# Patient Record
Sex: FEMALE | Race: OTHER | HISPANIC OR LATINO
[De-identification: names, ages, dates, MRNs, and addresses within clinical notes are randomized per-mention and may not be internally consistent; named-entity substitution may affect disease eponyms.]

---

## 2021-01-01 ENCOUNTER — TRANSCRIPTION ENCOUNTER (OUTPATIENT)
Age: 86
End: 2021-01-01

## 2021-04-14 ENCOUNTER — TRANSCRIPTION ENCOUNTER (OUTPATIENT)
Age: 86
End: 2021-04-14

## 2021-07-08 ENCOUNTER — TRANSCRIPTION ENCOUNTER (OUTPATIENT)
Age: 86
End: 2021-07-08

## 2021-07-14 ENCOUNTER — APPOINTMENT (OUTPATIENT)
Dept: ORTHOPEDIC SURGERY | Facility: CLINIC | Age: 86
End: 2021-07-14

## 2022-10-26 ENCOUNTER — EMERGENCY (EMERGENCY)
Facility: HOSPITAL | Age: 87
LOS: 1 days | Discharge: HOME | End: 2022-10-26
Attending: EMERGENCY MEDICINE
Payer: MEDICARE

## 2022-10-26 VITALS
SYSTOLIC BLOOD PRESSURE: 116 MMHG | RESPIRATION RATE: 18 BRPM | DIASTOLIC BLOOD PRESSURE: 65 MMHG | HEART RATE: 74 BPM | OXYGEN SATURATION: 96 % | TEMPERATURE: 97 F

## 2022-10-26 VITALS
OXYGEN SATURATION: 97 % | DIASTOLIC BLOOD PRESSURE: 67 MMHG | HEART RATE: 75 BPM | TEMPERATURE: 98 F | SYSTOLIC BLOOD PRESSURE: 124 MMHG | RESPIRATION RATE: 19 BRPM

## 2022-10-26 LAB
ALBUMIN SERPL ELPH-MCNC: 3.4 G/DL — LOW (ref 3.5–5)
ALP SERPL-CCNC: 58 U/L — SIGNIFICANT CHANGE UP (ref 40–120)
ALT FLD-CCNC: 24 U/L DA — SIGNIFICANT CHANGE UP (ref 10–60)
ANION GAP SERPL CALC-SCNC: 6 MMOL/L — SIGNIFICANT CHANGE UP (ref 5–17)
AST SERPL-CCNC: 46 U/L — HIGH (ref 10–40)
BASOPHILS # BLD AUTO: 0.05 K/UL — SIGNIFICANT CHANGE UP (ref 0–0.2)
BASOPHILS NFR BLD AUTO: 0.7 % — SIGNIFICANT CHANGE UP (ref 0–2)
BILIRUB SERPL-MCNC: 0.5 MG/DL — SIGNIFICANT CHANGE UP (ref 0.2–1.2)
BUN SERPL-MCNC: 15 MG/DL — SIGNIFICANT CHANGE UP (ref 7–18)
CALCIUM SERPL-MCNC: 8.8 MG/DL — SIGNIFICANT CHANGE UP (ref 8.4–10.5)
CHLORIDE SERPL-SCNC: 98 MMOL/L — SIGNIFICANT CHANGE UP (ref 96–108)
CO2 SERPL-SCNC: 26 MMOL/L — SIGNIFICANT CHANGE UP (ref 22–31)
CREAT SERPL-MCNC: 0.69 MG/DL — SIGNIFICANT CHANGE UP (ref 0.5–1.3)
EGFR: 82 ML/MIN/1.73M2 — SIGNIFICANT CHANGE UP
EOSINOPHIL # BLD AUTO: 0.03 K/UL — SIGNIFICANT CHANGE UP (ref 0–0.5)
EOSINOPHIL NFR BLD AUTO: 0.4 % — SIGNIFICANT CHANGE UP (ref 0–6)
GLUCOSE SERPL-MCNC: 117 MG/DL — HIGH (ref 70–99)
HCT VFR BLD CALC: 44 % — SIGNIFICANT CHANGE UP (ref 34.5–45)
HGB BLD-MCNC: 14.3 G/DL — SIGNIFICANT CHANGE UP (ref 11.5–15.5)
IMM GRANULOCYTES NFR BLD AUTO: 0.3 % — SIGNIFICANT CHANGE UP (ref 0–0.9)
LIDOCAIN IGE QN: 152 U/L — SIGNIFICANT CHANGE UP (ref 73–393)
LYMPHOCYTES # BLD AUTO: 1.21 K/UL — SIGNIFICANT CHANGE UP (ref 1–3.3)
LYMPHOCYTES # BLD AUTO: 16 % — SIGNIFICANT CHANGE UP (ref 13–44)
MAGNESIUM SERPL-MCNC: 2.3 MG/DL — SIGNIFICANT CHANGE UP (ref 1.6–2.6)
MCHC RBC-ENTMCNC: 28.1 PG — SIGNIFICANT CHANGE UP (ref 27–34)
MCHC RBC-ENTMCNC: 32.5 GM/DL — SIGNIFICANT CHANGE UP (ref 32–36)
MCV RBC AUTO: 86.4 FL — SIGNIFICANT CHANGE UP (ref 80–100)
MONOCYTES # BLD AUTO: 0.53 K/UL — SIGNIFICANT CHANGE UP (ref 0–0.9)
MONOCYTES NFR BLD AUTO: 7 % — SIGNIFICANT CHANGE UP (ref 2–14)
NEUTROPHILS # BLD AUTO: 5.72 K/UL — SIGNIFICANT CHANGE UP (ref 1.8–7.4)
NEUTROPHILS NFR BLD AUTO: 75.6 % — SIGNIFICANT CHANGE UP (ref 43–77)
NRBC # BLD: 0 /100 WBCS — SIGNIFICANT CHANGE UP (ref 0–0)
NT-PROBNP SERPL-SCNC: 109 PG/ML — SIGNIFICANT CHANGE UP (ref 0–450)
PLATELET # BLD AUTO: 156 K/UL — SIGNIFICANT CHANGE UP (ref 150–400)
POTASSIUM SERPL-MCNC: 5.3 MMOL/L — SIGNIFICANT CHANGE UP (ref 3.5–5.3)
POTASSIUM SERPL-SCNC: 5.3 MMOL/L — SIGNIFICANT CHANGE UP (ref 3.5–5.3)
PROT SERPL-MCNC: 7.4 G/DL — SIGNIFICANT CHANGE UP (ref 6–8.3)
RBC # BLD: 5.09 M/UL — SIGNIFICANT CHANGE UP (ref 3.8–5.2)
RBC # FLD: 13.9 % — SIGNIFICANT CHANGE UP (ref 10.3–14.5)
SARS-COV-2 RNA SPEC QL NAA+PROBE: SIGNIFICANT CHANGE UP
SODIUM SERPL-SCNC: 130 MMOL/L — LOW (ref 135–145)
TROPONIN I, HIGH SENSITIVITY RESULT: 7.2 NG/L — SIGNIFICANT CHANGE UP
WBC # BLD: 7.56 K/UL — SIGNIFICANT CHANGE UP (ref 3.8–10.5)
WBC # FLD AUTO: 7.56 K/UL — SIGNIFICANT CHANGE UP (ref 3.8–10.5)

## 2022-10-26 PROCEDURE — 36415 COLL VENOUS BLD VENIPUNCTURE: CPT

## 2022-10-26 PROCEDURE — 85025 COMPLETE CBC W/AUTO DIFF WBC: CPT

## 2022-10-26 PROCEDURE — 83690 ASSAY OF LIPASE: CPT

## 2022-10-26 PROCEDURE — 82962 GLUCOSE BLOOD TEST: CPT

## 2022-10-26 PROCEDURE — 87635 SARS-COV-2 COVID-19 AMP PRB: CPT

## 2022-10-26 PROCEDURE — 83880 ASSAY OF NATRIURETIC PEPTIDE: CPT

## 2022-10-26 PROCEDURE — 84484 ASSAY OF TROPONIN QUANT: CPT

## 2022-10-26 PROCEDURE — 99284 EMERGENCY DEPT VISIT MOD MDM: CPT

## 2022-10-26 PROCEDURE — 80053 COMPREHEN METABOLIC PANEL: CPT

## 2022-10-26 PROCEDURE — 83735 ASSAY OF MAGNESIUM: CPT

## 2022-10-26 PROCEDURE — 99285 EMERGENCY DEPT VISIT HI MDM: CPT

## 2022-10-26 PROCEDURE — 93005 ELECTROCARDIOGRAM TRACING: CPT

## 2022-10-26 RX ORDER — ACETAMINOPHEN 500 MG
650 TABLET ORAL ONCE
Refills: 0 | Status: COMPLETED | OUTPATIENT
Start: 2022-10-26 | End: 2022-10-26

## 2022-10-26 RX ADMIN — Medication 650 MILLIGRAM(S): at 17:50

## 2022-10-26 NOTE — ED PROVIDER NOTE - PROGRESS NOTE DETAILS
Pt feels better. Ambulatory w/o assistance. Educated on results, care and f/u PMD, spine. Answered q's. Accompanied by friend.

## 2022-10-26 NOTE — ED ADULT NURSE NOTE - MODE OF DISCHARGE
CardioPulmonary, Neurological) by intake and observation. Intake form has been scanned into medical record. Patient has been instructed to contact their primary care physician regarding ROS issues if not already being addressed at this time. Co-morbidities/Complexities (which will affect course of rehabilitation):   []None           Arthritic conditions   []Rheumatoid arthritis (M05.9)  [x]Osteoarthritis (M19.91)   Cardiovascular conditions   [x]Hypertension (I10)  []Hyperlipidemia (E78.5)  []Angina pectoris (I20)  []Atherosclerosis (I70)   Musculoskeletal conditions   []Disc pathology   []Congenital spine pathologies   [x]Prior surgical intervention  []Osteoporosis (M81.8)  []Osteopenia (M85.8)   Endocrine conditions   []Hypothyroid (E03.9)  []Hyperthyroid Gastrointestinal conditions   []Constipation (V22.64)   Metabolic conditions   []Morbid obesity (E66.01)  []Diabetes type 1(E10.65) or 2 (E11.65)   []Neuropathy (G60.9)     Pulmonary conditions   []Asthma (J45)  []Coughing   []COPD (J44.9)   Psychological Disorders  []Anxiety (F41.9)  []Depression (F32.9)   []Other:   []Other:          Barriers to/and or personal factors that will affect rehab potential:              []Age  []Sex              []Motivation/Lack of Motivation                        []Co-Morbidities              []Cognitive Function, education/learning barriers              []Environmental, home barriers              []profession/work barriers  []past PT/medical experience  []other:  Justification:     Falls Risk Assessment (30 days):   [x] Falls Risk assessed and no intervention required. [] Falls Risk assessed and Patient requires intervention due to being higher risk   TUG score (>12s at risk):     [] Falls education provided, including       G-Codes:  PT G-Codes  Functional Assessment Tool Used: LEFS   Score: 51%  Functional Limitation: Mobility: Walking and moving around  Mobility: Walking and Moving Around Current Status ():  At least 40 percent but less than 60 percent impaired, limited or restricted  Mobility: Walking and Moving Around Goal Status (): At least 20 percent but less than 40 percent impaired, limited or restricted    ASSESSMENT:   Functional Impairments:     [x]Noted lumbar/proximal hip/LE joint hypomobility   [x]Decreased LE functional ROM   [x]Decreased core/proximal hip strength and neuromuscular control   [x]Decreased LE functional strength   [x]Reduced balance/proprioceptive control   []other:      Functional Activity Limitations (from functional questionnaire and intake)   []Reduced ability to tolerate prolonged functional positions   []Reduced ability or difficulty with changes of positions or transfers between positions   []Reduced ability to maintain good posture and demonstrate good body mechanics with sitting, bending, and lifting   []Reduced ability to sleep   [] Reduced ability or tolerance with driving and/or computer work   []Reduced ability to perform lifting, carrying tasks   [x]Reduced ability to squat   []Reduced ability to forward bend   [x]Reduced ability to ambulate prolonged functional periods/distances/surfaces   [x]Reduced ability to ascend/descend stairs   []Reduced ability to run, hop, cut or jump   []other:    Participation Restrictions   [x]Reduced participation in self care activities   [x]Reduced participation in home management activities   [x]Reduced participation in work activities   [x]Reduced participation in social activities. [x]Reduced participation in sport/recreation activities. Classification :    [x]Signs/symptoms consistent with post-surgical status including decreased ROM, strength and function.    []Signs/symptoms consistent with joint sprain/strain   []Signs/symptoms consistent with patella-femoral syndrome   []Signs/symptoms consistent with knee OA/hip OA   []Signs/symptoms consistent with internal derangement of knee/Hip   []Signs/symptoms consistent with functional hip weakness/NMR control      []Signs/symptoms consistent with tendinitis/tendinosis    []signs/symptoms consistent with pathology which may benefit from Dry needling      []other:      Prognosis/Rehab Potential:      []Excellent   [x]Good    []Fair   []Poor    Tolerance of evaluation/treatment:    []Excellent   [x]Good    []Fair   []Poor  Physical Therapy Evaluation Complexity Justification  [x] A history of present problem with:  [] no personal factors and/or comorbidities that impact the plan of care;  [x]1-2 personal factors and/or comorbidities that impact the plan of care  []3 personal factors and/or comorbidities that impact the plan of care  [x] An examination of body systems using standardized tests and measures addressing any of the following: body structures and functions (impairments), activity limitations, and/or participation restrictions;:  [x] a total of 1-2 or more elements   [] a total of 3 or more elements   [] a total of 4 or more elements   [x] A clinical presentation with:  [x] stable and/or uncomplicated characteristics   [] evolving clinical presentation with changing characteristics  [] unstable and unpredictable characteristics;   [x] Clinical decision making of [x] low, [] moderate, [] high complexity using standardized patient assessment instrument and/or measurable assessment of functional outcome. [x] EVAL (LOW) 68248 (typically 20 minutes face-to-face)  [] EVAL (MOD) 77174 (typically 30 minutes face-to-face)  [] EVAL (HIGH) 71499 (typically 45 minutes face-to-face)  [] RE-EVAL       PLAN:   Frequency/Duration: 1-2days per week for 6-8 Weeks:  Interventions:  [x]  Therapeutic exercise including: strength training, ROM, for Lower extremity and core   [x]  NMR activation and proprioception for LE, Glutes and Core   [x]  Manual therapy as indicated for LE, Hip and spine to include: Dry Needling/IASTM, STM, PROM, Gr I-IV mobilizations, manipulation.    [x] Modalities as needed that may Ambulatory

## 2022-10-26 NOTE — ED ADULT NURSE NOTE - CHPI ED NUR SYMPTOMS NEG
no chest pain/no chills/no congestion/no diaphoresis/no dizziness/no fever/no nausea/no shortness of breath/no vomiting

## 2022-10-26 NOTE — ED PROVIDER NOTE - CLINICAL SUMMARY MEDICAL DECISION MAKING FREE TEXT BOX
89 yo F with syncopal episode secondary to chronic sciatica pain to left leg. No head strike. Normal neuro exam, no acute EKG changes. Currently pain controlled. Will send labs given tylenol for pain and reassess

## 2022-10-26 NOTE — ED PROVIDER NOTE - NSFOLLOWUPINSTRUCTIONS_ED_ALL_ED_FT
Syncope, Adult    Outline of the head showing blood vessels that supply the brain.   Syncope refers to a condition in which a person temporarily loses consciousness. Syncope may also be called fainting or passing out. It is caused by a sudden decrease in blood flow to the brain. This can happen for a variety of reasons.    Most causes of syncope are not dangerous. It can be triggered by things such as needle sticks, seeing blood, pain, or intense emotion. However, syncope can also be a sign of a serious medical problem, such as a heart abnormality. Other causes can include dehydration, migraines, or taking medicines that lower blood pressure. Your health care provider may do tests to find the reason why you are having syncope.    If you faint, get medical help right away. Call your local emergency services (911 in the [place]      Follow these instructions at home:    Pay attention to any changes in your symptoms. Take these actions to stay safe and to help relieve your symptoms:    Knowing when you may be about to faint   •Signs that you may be about to faint include:  •Feeling dizzy, weak, light-headed, or like the room is spinning.      •Feeling nauseous.      •Seeing spots or seeing all white or all black in your field of vision.      •Having cold, clammy skin or feeling warm and sweaty.      •Hearing ringing in the ears (tinnitus).      •If you start to feel like you might faint, sit or lie down right away. If sitting, put your head down between your legs. If lying down, raise (elevate) your feet above the level of your heart.  •Breathe deeply and steadily. Wait until all the symptoms have passed.      •Have someone stay with you until you feel stable.        Medicines     •Take over-the-counter and prescription medicines only as told by your health care provider.      •If you are taking blood pressure or heart medicine, get up slowly and take several minutes to sit and then stand. This can reduce dizziness and decrease the risk of syncope.      Lifestyle     • Do not drive, use machinery, or play sports until your health care provider says it is okay.      • Do not drink alcohol.      • Do not use any products that contain nicotine or tobacco. These products include cigarettes, chewing tobacco, and vaping devices, such as e-cigarettes. If you need help quitting, ask your health care provider.      •Avoid hot tubs and saunas.      General instructions     •Talk with your health care provider about your symptoms. You may need to have testing to understand the cause of your syncope.      •Drink enough fluid to keep your urine pale yellow.    •Avoid prolonged standing. If you must stand for a long time, do movements such as:  •Moving your legs.      •Crossing your legs.      •Flexing and stretching your leg muscles.      •Squatting.        •Keep all follow-up visits. This is important.        Contact a health care provider if:    •You have episodes of near fainting.        Get help right away if:    •You faint.      •You hit your head or are injured after fainting.    •You have any of these symptoms that may indicate trouble with your heart:  •Fast or irregular heartbeats (palpitations).      •Unusual pain in your chest, abdomen, or back.      •Shortness of breath.        •You have a seizure.      •You have a severe headache.      •You are confused.      •You have vision problems.      •You have severe weakness or trouble walking.      •You are bleeding from your mouth or rectum, or you have black or tarry stool.      These symptoms may represent a serious problem that is an emergency. Do not wait to see if your symptoms will go away. Get medical help right away. Call your local emergency services (911 in the [place] Do not drive yourself to the hospital.       Summary    •Syncope refers to a condition in which a person temporarily loses consciousness. Syncope may also be called fainting or passing out. It is caused by a sudden decrease in blood flow to the brain.      •Signs that you may be about to faint include dizziness, feeling light-headed, feeling nauseous, sudden vision changes, or cold, clammy skin.      •Even though most causes of syncope are not dangerous, syncope can be a sign of a serious medical problem. Get help right away if you faint.      •If you start to feel like you might faint, sit or lie down right away. If sitting, put your head down between your legs. If lying down, raise (elevate) your feet above the level of your heart.      This information is not intended to replace advice given to you by your health care provider. Make sure you discuss any questions you have with your health care provider.      Lumbar Spinal Stenosis    WHAT YOU NEED TO KNOW:    What is lumbar spinal stenosis? Lumbar spinal stenosis is narrowing of the spinal canal in your lower back. Your spinal canal is the opening in your spine that contains your spinal cord. When your spinal canal narrows, it may put pressure on your spinal cord and nerves.     What causes lumbar spinal stenosis? Narrowing of your spinal canal may be caused by changes that happen as you age. These changes include bone spurs (growths), herniated discs, and thickened ligaments. Bone growths can be caused by osteoarthritis. A herniated disc bulges out between the vertebrae (bones) and into your spinal canal. Discs are spongy cushions between the vertebrae in your spine. Herniated discs may be caused by activities that increase stress on the spine. An example is heavy lifting. Ligaments that connect the vertebrae may thicken and harden as you get older. Other conditions, such as spinal injuries and Paget's disease, can also cause spinal stenosis.     What are the signs and symptoms of lumbar spinal stenosis? Signs and symptoms may start or get worse when you stand or walk. They are often relieved when you sit or lean forward.   •Low back pain      •Pain, numbness, tingling, or weakness in one or both legs      •Pain in your buttocks that extends to your thighs or legs      How is lumbar spinal stenosis diagnosed? Your healthcare provider will ask about your symptoms and when they started. He or she will ask if you have any medical conditions. Your provider may ask you to lift, bend, walk, sit, or reach. An x-ray, MRI or a CT may show problems in your spine that are causing spinal stenosis. You may be given contrast liquid to help the spine show up better in the pictures. Tell the healthcare provider if you have ever had an allergic reaction to contrast liquid. Do not enter the MRI room with anything metal. Metal can cause serious injury. Tell the healthcare provider if you have any metal in or on your body.    How is lumbar spinal stenosis treated?   •NSAIDs, such as ibuprofen, help decrease swelling, pain, and fever. NSAIDs can cause stomach bleeding or kidney problems in certain people. If you take blood thinner medicine, always ask your healthcare provider if NSAIDs are safe for you. Always read the medicine label and follow directions.      •Acetaminophen decreases pain and fever. It is available without a doctor's order. Ask how much to take and how often to take it. Follow directions. Read the labels of all other medicines you are using to see if they also contain acetaminophen, or ask your doctor or pharmacist. Acetaminophen can cause liver damage if not taken correctly.      •Prescription pain medicine may be given. Ask how to take this medicine safely.      •Muscle relaxers help decrease pain and muscle spasms.      •A steroid injection may be given to reduce inflammation. Steroid medicine is injected into the epidural space. The epidural space is between your spinal cord and vertebrae.       •A nerve block is an injection of numbing medicine. You may need a nerve block if your pain is not going away, or is getting worse.      •Surgery may be needed to widen your spinal canal or decrease pressure on your spinal cord. Surgery may also be done to fix damaged or injured vertebrae in your back.      How can I manage my symptoms?   •Go to physical and occupational therapy as directed. A physical therapist teaches you exercises to help improve movement and strength, and to decrease pain. An occupational therapist teaches you skills to help with your daily activities.       •Rest when you feel it is needed. Slowly start to do more each day. Return to your daily activities as directed.       •Apply heat on your back for 20 to 30 minutes every 2 hours for as many days as directed. Heat helps decrease pain and muscle spasms.       •Apply ice on your back for 15 to 20 minutes every hour or as directed. Use an ice pack, or put crushed ice in a plastic bag. Cover it with a towel before you apply it to your skin. Ice helps prevent tissue damage and decreases swelling and pain.      When should I seek immediate care?   •You have pain in your leg that does not go away or gets worse.      •You have trouble moving your legs.       •You cannot control when you urinate or have a bowel movement.      When should I contact my healthcare provider?   •You have new or worsening symptoms.      •Your symptoms keep you from doing your daily activities.      •You have questions or concerns about your condition or care.      CARE AGREEMENT:    You have the right to help plan your care. Learn about your health condition and how it may be treated. Discuss treatment options with your healthcare providers to decide what care you want to receive. You always have the right to refuse treatment.

## 2022-10-26 NOTE — ED PROVIDER NOTE - PATIENT PORTAL LINK FT
You can access the FollowMyHealth Patient Portal offered by [facility] by registering at the following website: [email or web address]. By joining [employer]’s FollowMyHealth portal, you will also be able to view your health information using other applications (apps) compatible with our system.

## 2022-10-26 NOTE — ED ADULT TRIAGE NOTE - TEMPERATURE IN CELSIUS (DEGREES C)
The patient is hemodynamically stable.  The pain is controlled.  Patient is on DVT prophylaxis.  Patient is using incentive spirometry.  Oxygen saturation is acceptable.  Advance diet as tolerated.  Advance activity as tolerated.  Monitor for ileus.  Patient is on Laxatives.  Surgical wound is stable.  Indication for monitor bed.
36.3

## 2022-10-26 NOTE — ED ADULT NURSE NOTE - OBJECTIVE STATEMENT
Pt AOx4, BIBA s/p syncopal episode while sitting down at the pharmacy. PT reports she has been having lower back and left leg pain, and the pain was so bad she passed out. Pt denies CP, SOB, dizziness. EKG done, pt placed on cardiac monitor, no signs of distress noted.

## 2022-10-26 NOTE — ED ADULT TRIAGE NOTE - CHIEF COMPLAINT QUOTE
BIBA sp syncope, "felt pain left leg and went down" unconscious and drooling about 5 minutes, a&OX3 in triage

## 2022-10-26 NOTE — ED PROVIDER NOTE - OBJECTIVE STATEMENT
89 yo F h/o spinal stenosis, left sided lumbar radiculopathy p/w syncopal episode after feeling severe pain to left leg while seated in pharmacy. Pt had witnessed syncope. Currently no complaints. Has had pain on and off to left leg to foot for several years. Currently undergoing physical therapy. Recommended epidural but refused in past. Pt suffered no fall and denies any weakness, numbness or bowel/bladder symptoms. No recent fever, chills, chest pain or sob.

## 2023-05-06 ENCOUNTER — NON-APPOINTMENT (OUTPATIENT)
Age: 88
End: 2023-05-06

## 2024-01-31 ENCOUNTER — EMERGENCY (EMERGENCY)
Facility: HOSPITAL | Age: 89
LOS: 1 days | Discharge: HOME | End: 2024-01-31
Attending: EMERGENCY MEDICINE
Payer: MEDICARE

## 2024-01-31 VITALS
OXYGEN SATURATION: 98 % | HEART RATE: 68 BPM | WEIGHT: 146.61 LBS | SYSTOLIC BLOOD PRESSURE: 186 MMHG | TEMPERATURE: 98 F | RESPIRATION RATE: 18 BRPM | DIASTOLIC BLOOD PRESSURE: 100 MMHG

## 2024-01-31 PROCEDURE — 99285 EMERGENCY DEPT VISIT HI MDM: CPT

## 2024-01-31 NOTE — ED ADULT TRIAGE NOTE - CHIEF COMPLAINT QUOTE
high blood pressure all day today like 190 over 100 sometimes goes up over 200 as per daughter no dizziness or any headache

## 2024-02-01 VITALS
OXYGEN SATURATION: 98 % | RESPIRATION RATE: 18 BRPM | DIASTOLIC BLOOD PRESSURE: 77 MMHG | SYSTOLIC BLOOD PRESSURE: 183 MMHG | HEART RATE: 61 BPM

## 2024-02-01 LAB
ALBUMIN SERPL ELPH-MCNC: 3.4 G/DL — LOW (ref 3.5–5)
ALP SERPL-CCNC: 58 U/L — SIGNIFICANT CHANGE UP (ref 40–120)
ALT FLD-CCNC: 20 U/L DA — SIGNIFICANT CHANGE UP (ref 10–60)
ANION GAP SERPL CALC-SCNC: 3 MMOL/L — LOW (ref 5–17)
AST SERPL-CCNC: 14 U/L — SIGNIFICANT CHANGE UP (ref 10–40)
BASOPHILS # BLD AUTO: 0.03 K/UL — SIGNIFICANT CHANGE UP (ref 0–0.2)
BASOPHILS NFR BLD AUTO: 0.5 % — SIGNIFICANT CHANGE UP (ref 0–2)
BILIRUB SERPL-MCNC: 0.6 MG/DL — SIGNIFICANT CHANGE UP (ref 0.2–1.2)
BUN SERPL-MCNC: 12 MG/DL — SIGNIFICANT CHANGE UP (ref 7–18)
CALCIUM SERPL-MCNC: 8.7 MG/DL — SIGNIFICANT CHANGE UP (ref 8.4–10.5)
CHLORIDE SERPL-SCNC: 98 MMOL/L — SIGNIFICANT CHANGE UP (ref 96–108)
CO2 SERPL-SCNC: 28 MMOL/L — SIGNIFICANT CHANGE UP (ref 22–31)
CREAT SERPL-MCNC: 0.63 MG/DL — SIGNIFICANT CHANGE UP (ref 0.5–1.3)
EGFR: 84 ML/MIN/1.73M2 — SIGNIFICANT CHANGE UP
EOSINOPHIL # BLD AUTO: 0.06 K/UL — SIGNIFICANT CHANGE UP (ref 0–0.5)
EOSINOPHIL NFR BLD AUTO: 0.9 % — SIGNIFICANT CHANGE UP (ref 0–6)
GLUCOSE SERPL-MCNC: 122 MG/DL — HIGH (ref 70–99)
HCT VFR BLD CALC: 46.2 % — HIGH (ref 34.5–45)
HGB BLD-MCNC: 14.9 G/DL — SIGNIFICANT CHANGE UP (ref 11.5–15.5)
IMM GRANULOCYTES NFR BLD AUTO: 0.5 % — SIGNIFICANT CHANGE UP (ref 0–0.9)
LYMPHOCYTES # BLD AUTO: 1.69 K/UL — SIGNIFICANT CHANGE UP (ref 1–3.3)
LYMPHOCYTES # BLD AUTO: 26.5 % — SIGNIFICANT CHANGE UP (ref 13–44)
MAGNESIUM SERPL-MCNC: 2.1 MG/DL — SIGNIFICANT CHANGE UP (ref 1.6–2.6)
MCHC RBC-ENTMCNC: 28.1 PG — SIGNIFICANT CHANGE UP (ref 27–34)
MCHC RBC-ENTMCNC: 32.3 GM/DL — SIGNIFICANT CHANGE UP (ref 32–36)
MCV RBC AUTO: 87 FL — SIGNIFICANT CHANGE UP (ref 80–100)
MONOCYTES # BLD AUTO: 0.71 K/UL — SIGNIFICANT CHANGE UP (ref 0–0.9)
MONOCYTES NFR BLD AUTO: 11.1 % — SIGNIFICANT CHANGE UP (ref 2–14)
NEUTROPHILS # BLD AUTO: 3.86 K/UL — SIGNIFICANT CHANGE UP (ref 1.8–7.4)
NEUTROPHILS NFR BLD AUTO: 60.5 % — SIGNIFICANT CHANGE UP (ref 43–77)
NRBC # BLD: 0 /100 WBCS — SIGNIFICANT CHANGE UP (ref 0–0)
PLATELET # BLD AUTO: 136 K/UL — LOW (ref 150–400)
POTASSIUM SERPL-MCNC: 4.7 MMOL/L — SIGNIFICANT CHANGE UP (ref 3.5–5.3)
POTASSIUM SERPL-SCNC: 4.7 MMOL/L — SIGNIFICANT CHANGE UP (ref 3.5–5.3)
PROT SERPL-MCNC: 6.7 G/DL — SIGNIFICANT CHANGE UP (ref 6–8.3)
RBC # BLD: 5.31 M/UL — HIGH (ref 3.8–5.2)
RBC # FLD: 13.6 % — SIGNIFICANT CHANGE UP (ref 10.3–14.5)
SODIUM SERPL-SCNC: 129 MMOL/L — LOW (ref 135–145)
TROPONIN I, HIGH SENSITIVITY RESULT: 14.8 NG/L — SIGNIFICANT CHANGE UP
WBC # BLD: 6.38 K/UL — SIGNIFICANT CHANGE UP (ref 3.8–10.5)
WBC # FLD AUTO: 6.38 K/UL — SIGNIFICANT CHANGE UP (ref 3.8–10.5)

## 2024-02-01 PROCEDURE — 99283 EMERGENCY DEPT VISIT LOW MDM: CPT

## 2024-02-01 PROCEDURE — 84484 ASSAY OF TROPONIN QUANT: CPT

## 2024-02-01 PROCEDURE — 83735 ASSAY OF MAGNESIUM: CPT

## 2024-02-01 PROCEDURE — 85025 COMPLETE CBC W/AUTO DIFF WBC: CPT

## 2024-02-01 PROCEDURE — 36415 COLL VENOUS BLD VENIPUNCTURE: CPT

## 2024-02-01 PROCEDURE — 93005 ELECTROCARDIOGRAM TRACING: CPT

## 2024-02-01 PROCEDURE — 80053 COMPREHEN METABOLIC PANEL: CPT

## 2024-02-01 RX ORDER — AMLODIPINE BESYLATE 2.5 MG/1
5 TABLET ORAL ONCE
Refills: 0 | Status: COMPLETED | OUTPATIENT
Start: 2024-02-01 | End: 2024-02-01

## 2024-02-01 RX ADMIN — AMLODIPINE BESYLATE 5 MILLIGRAM(S): 2.5 TABLET ORAL at 02:44

## 2024-02-01 NOTE — ED PROVIDER NOTE - OBJECTIVE STATEMENT
89 year old female PMH HTN (on valsartan and metoprolol) coming in with HTN. pt states her BPs have been elevated lately and had her valsartan increased 2 days ago by her PCP and prior she was taking valsartan in the morning and metoprolol at night and was told to switch them. states she did start metoprolol in AM and valsartan in PM but since then her Bps have been more elevated. denies all other complaints.

## 2024-02-01 NOTE — ED ADULT NURSE NOTE - NS ED NURSE DISCH DISPOSITION
Pt notified and verbalizes understanding. Would like to continue observing with 6mo US.    Discharged

## 2024-02-01 NOTE — ED PROVIDER NOTE - CONDITION AT DISCHARGE:
Post leg stent  recovery stable. VascBand weaned off in 2 hours and left wrist site wnl. VSS. Up in [place] and tolerating activity with no complaints. Voiding and tolerating food and drink with no complaints. Pt has Plavix at home with script full and refills.D/C information reviewed with patient and family who verbalized good understanding. Ride home provided by medical transport.      .  
Improved

## 2024-02-01 NOTE — ED PROVIDER NOTE - PROGRESS NOTE DETAILS
ecg no acute ischemic changes. labs are unremarkable. given dose of amlodipine in the ED- BP improving. pt has no complaints. advised to f/u with PCP tomorrow about BP medications. return precautions discussed.

## 2024-02-01 NOTE — ED PROVIDER NOTE - PATIENT PORTAL LINK FT
You can access the FollowMyHealth Patient Portal offered by [facility] by registering at the following website: [email or web address]. By joining [employer]’s FollowMyHealth portal, you will also be able to view your health information using other applications (apps) compatible with our system.

## 2024-12-08 ENCOUNTER — INPATIENT (INPATIENT)
Facility: HOSPITAL | Age: 88
LOS: 7 days | Discharge: HOME HEALTH | DRG: 536 | End: 2024-12-16
Attending: STUDENT IN AN ORGANIZED HEALTH CARE EDUCATION/TRAINING PROGRAM | Admitting: STUDENT IN AN ORGANIZED HEALTH CARE EDUCATION/TRAINING PROGRAM
Payer: MEDICARE

## 2024-12-08 VITALS
DIASTOLIC BLOOD PRESSURE: 80 MMHG | RESPIRATION RATE: 18 BRPM | TEMPERATURE: 97 F | SYSTOLIC BLOOD PRESSURE: 177 MMHG | WEIGHT: 175.05 LBS | HEART RATE: 73 BPM | OXYGEN SATURATION: 96 % | HEIGHT: 62 IN

## 2024-12-08 DIAGNOSIS — S72.009A FRACTURE OF UNSPECIFIED PART OF NECK OF UNSPECIFIED FEMUR, INITIAL ENCOUNTER FOR CLOSED FRACTURE: ICD-10-CM

## 2024-12-08 DIAGNOSIS — Z29.9 ENCOUNTER FOR PROPHYLACTIC MEASURES, UNSPECIFIED: ICD-10-CM

## 2024-12-08 DIAGNOSIS — Z85.3 PERSONAL HISTORY OF MALIGNANT NEOPLASM OF BREAST: ICD-10-CM

## 2024-12-08 DIAGNOSIS — Z87.39 PERSONAL HISTORY OF OTHER DISEASES OF THE MUSCULOSKELETAL SYSTEM AND CONNECTIVE TISSUE: ICD-10-CM

## 2024-12-08 DIAGNOSIS — Z98.890 OTHER SPECIFIED POSTPROCEDURAL STATES: Chronic | ICD-10-CM

## 2024-12-08 DIAGNOSIS — S72.141A DISPLACED INTERTROCHANTERIC FRACTURE OF RIGHT FEMUR, INITIAL ENCOUNTER FOR CLOSED FRACTURE: ICD-10-CM

## 2024-12-08 DIAGNOSIS — Z01.818 ENCOUNTER FOR OTHER PREPROCEDURAL EXAMINATION: ICD-10-CM

## 2024-12-08 DIAGNOSIS — I10 ESSENTIAL (PRIMARY) HYPERTENSION: ICD-10-CM

## 2024-12-08 LAB
ALBUMIN SERPL ELPH-MCNC: 3.4 G/DL — LOW (ref 3.5–5)
ALP SERPL-CCNC: 73 U/L — SIGNIFICANT CHANGE UP (ref 40–120)
ALT FLD-CCNC: 15 U/L DA — SIGNIFICANT CHANGE UP (ref 10–60)
ANION GAP SERPL CALC-SCNC: 7 MMOL/L — SIGNIFICANT CHANGE UP (ref 5–17)
APTT BLD: 31.4 SEC — SIGNIFICANT CHANGE UP (ref 24.5–35.6)
AST SERPL-CCNC: 17 U/L — SIGNIFICANT CHANGE UP (ref 10–40)
BASOPHILS # BLD AUTO: 0.03 K/UL — SIGNIFICANT CHANGE UP (ref 0–0.2)
BASOPHILS NFR BLD AUTO: 0.2 % — SIGNIFICANT CHANGE UP (ref 0–2)
BILIRUB SERPL-MCNC: 0.5 MG/DL — SIGNIFICANT CHANGE UP (ref 0.2–1.2)
BUN SERPL-MCNC: 13 MG/DL — SIGNIFICANT CHANGE UP (ref 7–18)
CALCIUM SERPL-MCNC: 9 MG/DL — SIGNIFICANT CHANGE UP (ref 8.4–10.5)
CHLORIDE SERPL-SCNC: 94 MMOL/L — LOW (ref 96–108)
CO2 SERPL-SCNC: 29 MMOL/L — SIGNIFICANT CHANGE UP (ref 22–31)
CREAT SERPL-MCNC: 0.68 MG/DL — SIGNIFICANT CHANGE UP (ref 0.5–1.3)
EGFR: 82 ML/MIN/1.73M2 — SIGNIFICANT CHANGE UP
EOSINOPHIL # BLD AUTO: 0.01 K/UL — SIGNIFICANT CHANGE UP (ref 0–0.5)
EOSINOPHIL NFR BLD AUTO: 0.1 % — SIGNIFICANT CHANGE UP (ref 0–6)
GLUCOSE SERPL-MCNC: 157 MG/DL — HIGH (ref 70–99)
HCT VFR BLD CALC: 41.3 % — SIGNIFICANT CHANGE UP (ref 34.5–45)
HGB BLD-MCNC: 14.2 G/DL — SIGNIFICANT CHANGE UP (ref 11.5–15.5)
IMM GRANULOCYTES NFR BLD AUTO: 0.5 % — SIGNIFICANT CHANGE UP (ref 0–0.9)
INR BLD: 1.1 RATIO — SIGNIFICANT CHANGE UP (ref 0.85–1.16)
LYMPHOCYTES # BLD AUTO: 0.91 K/UL — LOW (ref 1–3.3)
LYMPHOCYTES # BLD AUTO: 7.3 % — LOW (ref 13–44)
MCHC RBC-ENTMCNC: 29.2 PG — SIGNIFICANT CHANGE UP (ref 27–34)
MCHC RBC-ENTMCNC: 34.4 G/DL — SIGNIFICANT CHANGE UP (ref 32–36)
MCV RBC AUTO: 85 FL — SIGNIFICANT CHANGE UP (ref 80–100)
MONOCYTES # BLD AUTO: 0.85 K/UL — SIGNIFICANT CHANGE UP (ref 0–0.9)
MONOCYTES NFR BLD AUTO: 6.8 % — SIGNIFICANT CHANGE UP (ref 2–14)
NEUTROPHILS # BLD AUTO: 10.66 K/UL — HIGH (ref 1.8–7.4)
NEUTROPHILS NFR BLD AUTO: 85.1 % — HIGH (ref 43–77)
NRBC # BLD: 0 /100 WBCS — SIGNIFICANT CHANGE UP (ref 0–0)
PLATELET # BLD AUTO: 155 K/UL — SIGNIFICANT CHANGE UP (ref 150–400)
POTASSIUM SERPL-MCNC: 4.2 MMOL/L — SIGNIFICANT CHANGE UP (ref 3.5–5.3)
POTASSIUM SERPL-SCNC: 4.2 MMOL/L — SIGNIFICANT CHANGE UP (ref 3.5–5.3)
PROT SERPL-MCNC: 6.4 G/DL — SIGNIFICANT CHANGE UP (ref 6–8.3)
PROTHROM AB SERPL-ACNC: 12.8 SEC — SIGNIFICANT CHANGE UP (ref 9.9–13.4)
RBC # BLD: 4.86 M/UL — SIGNIFICANT CHANGE UP (ref 3.8–5.2)
RBC # FLD: 13.4 % — SIGNIFICANT CHANGE UP (ref 10.3–14.5)
SODIUM SERPL-SCNC: 130 MMOL/L — LOW (ref 135–145)
WBC # BLD: 12.52 K/UL — HIGH (ref 3.8–10.5)
WBC # FLD AUTO: 12.52 K/UL — HIGH (ref 3.8–10.5)

## 2024-12-08 PROCEDURE — 99285 EMERGENCY DEPT VISIT HI MDM: CPT

## 2024-12-08 PROCEDURE — 73562 X-RAY EXAM OF KNEE 3: CPT | Mod: 26,RT

## 2024-12-08 PROCEDURE — 73501 X-RAY EXAM HIP UNI 1 VIEW: CPT | Mod: 26,RT

## 2024-12-08 PROCEDURE — 71045 X-RAY EXAM CHEST 1 VIEW: CPT | Mod: 26

## 2024-12-08 PROCEDURE — 73552 X-RAY EXAM OF FEMUR 2/>: CPT | Mod: 26,RT

## 2024-12-08 PROCEDURE — 99223 1ST HOSP IP/OBS HIGH 75: CPT | Mod: GC

## 2024-12-08 PROCEDURE — 93010 ELECTROCARDIOGRAM REPORT: CPT

## 2024-12-08 RX ORDER — ACETAMINOPHEN, DIPHENHYDRAMINE HCL, PHENYLEPHRINE HCL 325; 25; 5 MG/1; MG/1; MG/1
3 TABLET ORAL AT BEDTIME
Refills: 0 | Status: DISCONTINUED | OUTPATIENT
Start: 2024-12-08 | End: 2024-12-16

## 2024-12-08 RX ORDER — VALSARTAN 320 MG/1
160 TABLET ORAL DAILY
Refills: 0 | Status: DISCONTINUED | OUTPATIENT
Start: 2024-12-08 | End: 2024-12-10

## 2024-12-08 RX ORDER — HEPARIN SODIUM,PORCINE 1000/ML
5000 VIAL (ML) INJECTION ONCE
Refills: 0 | Status: COMPLETED | OUTPATIENT
Start: 2024-12-08 | End: 2024-12-08

## 2024-12-08 RX ORDER — ONDANSETRON HYDROCHLORIDE 4 MG/1
4 TABLET, FILM COATED ORAL EVERY 8 HOURS
Refills: 0 | Status: DISCONTINUED | OUTPATIENT
Start: 2024-12-08 | End: 2024-12-13

## 2024-12-08 RX ORDER — ACETAMINOPHEN 500MG 500 MG/1
650 TABLET, COATED ORAL EVERY 6 HOURS
Refills: 0 | Status: DISCONTINUED | OUTPATIENT
Start: 2024-12-08 | End: 2024-12-10

## 2024-12-08 RX ORDER — AMLODIPINE BESYLATE 10 MG/1
2.5 TABLET ORAL DAILY
Refills: 0 | Status: DISCONTINUED | OUTPATIENT
Start: 2024-12-08 | End: 2024-12-08

## 2024-12-08 RX ORDER — HEPARIN SODIUM,PORCINE 1000/ML
5000 VIAL (ML) INJECTION ONCE
Refills: 0 | Status: DISCONTINUED | OUTPATIENT
Start: 2024-12-08 | End: 2024-12-08

## 2024-12-08 RX ORDER — VALSARTAN 320 MG/1
1 TABLET ORAL
Refills: 0 | DISCHARGE

## 2024-12-08 RX ORDER — VALSARTAN 320 MG/1
160 TABLET ORAL DAILY
Refills: 0 | Status: DISCONTINUED | OUTPATIENT
Start: 2024-12-08 | End: 2024-12-08

## 2024-12-08 RX ORDER — MAGNESIUM, ALUMINUM HYDROXIDE 200-225/5
30 SUSPENSION, ORAL (FINAL DOSE FORM) ORAL EVERY 4 HOURS
Refills: 0 | Status: DISCONTINUED | OUTPATIENT
Start: 2024-12-08 | End: 2024-12-16

## 2024-12-08 RX ORDER — METOPROLOL TARTRATE 100 MG/1
25 TABLET, FILM COATED ORAL DAILY
Refills: 0 | Status: DISCONTINUED | OUTPATIENT
Start: 2024-12-08 | End: 2024-12-16

## 2024-12-08 RX ADMIN — Medication 2 MILLIGRAM(S): at 23:19

## 2024-12-08 RX ADMIN — Medication 2 MILLIGRAM(S): at 18:41

## 2024-12-08 RX ADMIN — Medication 5000 UNIT(S): at 20:54

## 2024-12-08 RX ADMIN — Medication 2 MILLIGRAM(S): at 16:30

## 2024-12-08 RX ADMIN — Medication 2 MILLIGRAM(S): at 19:11

## 2024-12-08 RX ADMIN — VALSARTAN 160 MILLIGRAM(S): 320 TABLET ORAL at 19:52

## 2024-12-08 NOTE — H&P ADULT - HISTORY OF PRESENT ILLNESS
89 yo F, ambulates with a walker,   with PMH of HTN, Spinal stenosis,  RT Breast CA s/p lumpectomy, radiation and chemo, present with right hip pain s/p unwitnessed mechanical fall. Patient reports that she walked into her bathroom without a walker when she accidentally slipped and fell on her right side. Patient denies any head trauma, LOC, headache, vision change, chest pain, numbness/tingling sensation. Patient reports since the fall she has been  unable to ambulates.    In the ED,   v/s:   Labs:  EKG:   UA:   CXR:  CTA:  s/p   s/p bolus   89 yo F, lives alone HHA  8hrs/6 days, ambulates with a walker, with PMH of HTN, Spinal stenosis,  RT Breast CA s/p lumpectomy, radiation and chemo, present with right hip pain s/p unwitnessed mechanical fall. Patient reports that she walked into her bathroom without a walker when she accidentally slipped and fell on her right side. Patient denies any head trauma, LOC, headache, vision change, chest pain, numbness/tingling sensation. Patient reports since the fall she has been  unable to ambulates.    In the ED,   v/s: 177/80, 73, 97.4F, 96% RA  Labs: WBC 12, Hgb 14  EK BPM, NSR  CXR: negative; There is lower lumbar degeneration and left lumbar curve  Xray RT Hip:  There is an intertrochanteric fracture of the right hip with increased  angulation at the fracture site.  Xray RT Femur:  Lower femur are intact.  Xray RT knee : No effusion. Arterial calcifications. Diffuse moderate degeneration. No fracture  s/p Morphine 2mg  s/p bolus

## 2024-12-08 NOTE — ED ADULT TRIAGE NOTE - CHIEF COMPLAINT QUOTE
c/o right hip pain s/p trip and fall at home about an hour ago. Denies hitting head, no LOC. Denies blood thinner use

## 2024-12-08 NOTE — PATIENT PROFILE ADULT - FUNCTIONAL ASSESSMENT - BASIC MOBILITY 6.
1-calculated by average/Not able to assess (calculate score using [facility] averaging method)

## 2024-12-08 NOTE — H&P ADULT - PROBLEM SELECTOR PLAN 2
EK BPM, NSR  CXR: clear for acute pathology   COFFEY:  0.1 risk of postoperative pneumonia  RCRI: 0  METS: > 4  Low to moderate risk for moderate procedure.

## 2024-12-08 NOTE — H&P ADULT - ATTENDING COMMENTS
Patient was seen and examined at bedside. History as above. During my exam patient reports being in pain but denies any other complains.     Vitals noted    P/E:  NAD  AAOx3, no focal deficit, limited exam of RLE  CTABL  S1S2 WNL  Abd soft, nontender, BS present   Right hip tenderness, no bruise or local swelling   BLLE No edema or calf tenderness, ADP palpable in BLLE     Labs noted     A/P:  Right hip fracture   HTN  H/o spinal stenosis   Left macular degeneration   H/o left breast ca with lumpectomy, radiation and chemotherapy- on remission for 20 years   Mechanical fall    Plan:  RCRI score 0, 3.9% 30 day risk of MACE. Patient is low to moderate risk for moderate risk procedure. Patient reports having adverse reaction to Novocain (unknown) and during her lumpectomy it took long time for her to recovery. As per patient, her father  of adverse reaction to anesthesia.   EKG NSR  Patient is medically optimized for orthopedic procedure tomorrow  Cont home dose Metoprolol perioperatively, titrate dose of valsartan, monitor renal function, hold if SILVIA   Heparin SQ for tonight, hold from tomorrow for OR  NPO after midnight  Low dose Morphine for pain management with bowel regimen  Monitor for urinary retention and constipation   Monitor NA   Plan of care was discussed with patient and daughter at bedside; all questions and concerns were addressed and care was aligned with patient's wishes.

## 2024-12-08 NOTE — PATIENT PROFILE ADULT - FALL HARM RISK - HARM RISK INTERVENTIONS

## 2024-12-08 NOTE — ED PROVIDER NOTE - MUSCULOSKELETAL, MLM
Spine appears normal, range of motion is not limited, no muscle or joint tenderness.   Right hip tender palpation.  Right lower extremity neurovascular intact.  Right lower extremity shortened.  Externally rotated.

## 2024-12-08 NOTE — ED ADULT NURSE NOTE - OBJECTIVE STATEMENT
Patient presented to the ED with c/o right hip pain s/p fall. Patient presented to the ED with c/o right hip pain s/p fall. Ulcer to right shin.

## 2024-12-08 NOTE — H&P ADULT - NSHPPHYSICALEXAM_GEN_ALL_CORE
T(F): 97.4 (08 Dec 2024 14:22), Max: 97.4 (08 Dec 2024 14:22)  HR: 73 (08 Dec 2024 14:22)  BP: 177/80 (08 Dec 2024 14:22)  RR: 18 (08 Dec 2024 14:22)  SpO2: 96% (08 Dec 2024 14:22) (96% - 96%)  temp max in last 48H T(F): , Max: 97.4 (12-08-24 @ 14:22)    GENERAL: NAD, lying in bed comfortably   HEAD:  Atraumatic, Normocephalic  EYES: clear conjunctiva and  sclera   ENT: Moist mucous membranes  NECK: Supple  LUNG: Clear to auscultation bilaterally. No rales, wheezing,   HEART: Regular rate and rhythm; No murmurs,   ABDOMEN: Bowel sounds present; Soft, Nontender, Nondistended,  EXTREMITIES:  2+ Peripheral Pulses, . No clubbing, cyanosis, or edema  RT HIP: Tenderness to palpation. lower extremity shortened in comparison to left hip.  NERVOUS SYSTEM:  AAOX3, speech clear. No deficits   SKIN: No rashes or lesions

## 2024-12-08 NOTE — H&P ADULT - PROBLEM SELECTOR PROBLEM 4
----- Message from Harsha Cespedes DO sent at 1/17/2022 12:47 PM CST -----  Please notify patient of results.  The pathology results demonstrated Tubular adenoma.  10   History of spinal stenosis

## 2024-12-08 NOTE — CHART NOTE - NSCHARTNOTEFT_GEN_A_CORE
Met with pt's emergency contact daughter Elayne Rebeca at the bedside.  Both patient and daughter updated on patient's clinical status and current plan of care. Daughter will bring  patient's current list of medication in. Patient's daughter wants to be notified of any plan for surgery at [phone]. All questions and concerns addressed.

## 2024-12-08 NOTE — ED PROVIDER NOTE - CLINICAL SUMMARY MEDICAL DECISION MAKING FREE TEXT BOX
Elderly female mechanical fall unable to get up clinical exam looks like hip fracture.  Pending x-rays labs EKG chest x-ray orthopedic consult pain meds reassessment.  Anticipate admission to medicine.  Head is atraumatic denies headache   Neck pain vomiting CT head not indicated.  Neuro intact.

## 2024-12-08 NOTE — H&P ADULT - PROBLEM SELECTOR PLAN 4
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

## 2024-12-08 NOTE — H&P ADULT - NSHPREVIEWOFSYSTEMS_GEN_ALL_CORE
REVIEW OF SYSTEMS:  CONSTITUTIONAL: No fevers or chills  EYES: No conjunctiva redness, No eye discharge  ENT: No nasal congestion, No sore throat, No ear pain,   NECK: No pain or stiffness  RESPIRATORY: No cough, SOB,  wheezing, hemoptysis  CARDIOVASCULAR: No chest pain or  palpitations  GASTROINTESTINAL: No abdominal pain. No nausea, vomiting, diarrhea or constipation.  GENITOURINARY: No dysuria, frequency or hematuria  NEUROLOGICAL: No headache,  MSK: Right hip pain   SKIN: No itching, rashes,   All other review of systems is negative unless indicated above.

## 2024-12-08 NOTE — ED ADULT NURSE NOTE - NSFALLHARMRISKINTERV_ED_ALL_ED

## 2024-12-08 NOTE — H&P ADULT - PROBLEM SELECTOR PLAN 1
Present with right hip pain s/p unwitnessed mechanical fall.  RT HIP: Tenderness to palpation. lower extremity shortened in comparison to left hip.  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  Xray RT Femur:  Lower femur are intact.  Xray RT knee : No effusion. Arterial calcifications. Diffuse moderate degeneration. No fracture  s/p Morphine 2mg  - Orthopedic consulted by the ED  > OR 24  - Pain med: Tylenol, Morphine 2mg, Morphine 4prn (mild, moderate, severe pain) Present with right hip pain s/p unwitnessed mechanical fall.  RT HIP: Tenderness to palpation. lower extremity shortened in comparison to left hip.  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  Xray RT Femur:  Lower femur are intact.  Xray RT knee : No effusion. Arterial calcifications. Diffuse moderate degeneration. No fracture  s/p Morphine 2mg  - Orthopedic consulted by the ED  > OR 24  - Pain med: Tylenol, Morphine 2mg, Morphine 4prn (mild, moderate, severe pain)  - Fall precaution

## 2024-12-08 NOTE — ED PROVIDER NOTE - OBJECTIVE STATEMENT
89-year-old female comes in with unwitnessed mechanical fall in her bathroom.  Unable to get up.  Fell onto the right side.  Right leg is shortened.  Denies head strike headache nausea or vomiting.  History of hypertension on metoprolol.  Takes a baby aspirin a day.  No chest pain no shortness of breath no other complaints.

## 2024-12-08 NOTE — H&P ADULT - ASSESSMENT
89 yo F, lives alone HHA  8hrs/6 days, ambulates with a walker, with PMH of HTN, Spinal stenosis,  RT Breast CA s/p lumpectomy, radiation and chemo, present with right hip pain s/p unwitnessed mechanical fall. Admitted for intertrochanteric fracture of right hipH      MED REC AM

## 2024-12-09 ENCOUNTER — TRANSCRIPTION ENCOUNTER (OUTPATIENT)
Age: 88
End: 2024-12-09

## 2024-12-09 LAB
ABO RH CONFIRMATION: SIGNIFICANT CHANGE UP
ALBUMIN SERPL ELPH-MCNC: 3 G/DL — LOW (ref 3.5–5)
ALP SERPL-CCNC: 64 U/L — SIGNIFICANT CHANGE UP (ref 40–120)
ALT FLD-CCNC: 15 U/L DA — SIGNIFICANT CHANGE UP (ref 10–60)
ANION GAP SERPL CALC-SCNC: 5 MMOL/L — SIGNIFICANT CHANGE UP (ref 5–17)
ANION GAP SERPL CALC-SCNC: 6 MMOL/L — SIGNIFICANT CHANGE UP (ref 5–17)
APTT BLD: 30.7 SEC — SIGNIFICANT CHANGE UP (ref 24.5–35.6)
AST SERPL-CCNC: 14 U/L — SIGNIFICANT CHANGE UP (ref 10–40)
BASOPHILS # BLD AUTO: 0.06 K/UL — SIGNIFICANT CHANGE UP (ref 0–0.2)
BASOPHILS NFR BLD AUTO: 0.5 % — SIGNIFICANT CHANGE UP (ref 0–2)
BILIRUB SERPL-MCNC: 0.7 MG/DL — SIGNIFICANT CHANGE UP (ref 0.2–1.2)
BLD GP AB SCN SERPL QL: SIGNIFICANT CHANGE UP
BUN SERPL-MCNC: 11 MG/DL — SIGNIFICANT CHANGE UP (ref 7–18)
BUN SERPL-MCNC: 15 MG/DL — SIGNIFICANT CHANGE UP (ref 7–18)
CALCIUM SERPL-MCNC: 8.6 MG/DL — SIGNIFICANT CHANGE UP (ref 8.4–10.5)
CALCIUM SERPL-MCNC: 8.6 MG/DL — SIGNIFICANT CHANGE UP (ref 8.4–10.5)
CHLORIDE SERPL-SCNC: 95 MMOL/L — LOW (ref 96–108)
CHLORIDE SERPL-SCNC: 97 MMOL/L — SIGNIFICANT CHANGE UP (ref 96–108)
CO2 SERPL-SCNC: 27 MMOL/L — SIGNIFICANT CHANGE UP (ref 22–31)
CO2 SERPL-SCNC: 28 MMOL/L — SIGNIFICANT CHANGE UP (ref 22–31)
CREAT SERPL-MCNC: 0.56 MG/DL — SIGNIFICANT CHANGE UP (ref 0.5–1.3)
CREAT SERPL-MCNC: 0.64 MG/DL — SIGNIFICANT CHANGE UP (ref 0.5–1.3)
EGFR: 83 ML/MIN/1.73M2 — SIGNIFICANT CHANGE UP
EGFR: 86 ML/MIN/1.73M2 — SIGNIFICANT CHANGE UP
EOSINOPHIL # BLD AUTO: 0.07 K/UL — SIGNIFICANT CHANGE UP (ref 0–0.5)
EOSINOPHIL NFR BLD AUTO: 0.6 % — SIGNIFICANT CHANGE UP (ref 0–6)
GLUCOSE SERPL-MCNC: 120 MG/DL — HIGH (ref 70–99)
GLUCOSE SERPL-MCNC: 123 MG/DL — HIGH (ref 70–99)
HCT VFR BLD CALC: 37 % — SIGNIFICANT CHANGE UP (ref 34.5–45)
HCT VFR BLD CALC: 37.2 % — SIGNIFICANT CHANGE UP (ref 34.5–45)
HGB BLD-MCNC: 12.7 G/DL — SIGNIFICANT CHANGE UP (ref 11.5–15.5)
HGB BLD-MCNC: 12.9 G/DL — SIGNIFICANT CHANGE UP (ref 11.5–15.5)
IMM GRANULOCYTES NFR BLD AUTO: 0.4 % — SIGNIFICANT CHANGE UP (ref 0–0.9)
INR BLD: 1.12 RATIO — SIGNIFICANT CHANGE UP (ref 0.85–1.16)
LYMPHOCYTES # BLD AUTO: 1.89 K/UL — SIGNIFICANT CHANGE UP (ref 1–3.3)
LYMPHOCYTES # BLD AUTO: 16.7 % — SIGNIFICANT CHANGE UP (ref 13–44)
MAGNESIUM SERPL-MCNC: 2.2 MG/DL — SIGNIFICANT CHANGE UP (ref 1.6–2.6)
MCHC RBC-ENTMCNC: 29.1 PG — SIGNIFICANT CHANGE UP (ref 27–34)
MCHC RBC-ENTMCNC: 29.7 PG — SIGNIFICANT CHANGE UP (ref 27–34)
MCHC RBC-ENTMCNC: 34.3 G/DL — SIGNIFICANT CHANGE UP (ref 32–36)
MCHC RBC-ENTMCNC: 34.7 G/DL — SIGNIFICANT CHANGE UP (ref 32–36)
MCV RBC AUTO: 84.9 FL — SIGNIFICANT CHANGE UP (ref 80–100)
MCV RBC AUTO: 85.5 FL — SIGNIFICANT CHANGE UP (ref 80–100)
MONOCYTES # BLD AUTO: 1.15 K/UL — HIGH (ref 0–0.9)
MONOCYTES NFR BLD AUTO: 10.2 % — SIGNIFICANT CHANGE UP (ref 2–14)
NEUTROPHILS # BLD AUTO: 8.11 K/UL — HIGH (ref 1.8–7.4)
NEUTROPHILS NFR BLD AUTO: 71.6 % — SIGNIFICANT CHANGE UP (ref 43–77)
NRBC # BLD: 0 /100 WBCS — SIGNIFICANT CHANGE UP (ref 0–0)
NRBC # BLD: 0 /100 WBCS — SIGNIFICANT CHANGE UP (ref 0–0)
PHOSPHATE SERPL-MCNC: 3.4 MG/DL — SIGNIFICANT CHANGE UP (ref 2.5–4.5)
PLATELET # BLD AUTO: 128 K/UL — LOW (ref 150–400)
PLATELET # BLD AUTO: 130 K/UL — LOW (ref 150–400)
POTASSIUM SERPL-MCNC: 3.9 MMOL/L — SIGNIFICANT CHANGE UP (ref 3.5–5.3)
POTASSIUM SERPL-MCNC: 4.3 MMOL/L — SIGNIFICANT CHANGE UP (ref 3.5–5.3)
POTASSIUM SERPL-SCNC: 3.9 MMOL/L — SIGNIFICANT CHANGE UP (ref 3.5–5.3)
POTASSIUM SERPL-SCNC: 4.3 MMOL/L — SIGNIFICANT CHANGE UP (ref 3.5–5.3)
PROT SERPL-MCNC: 5.8 G/DL — LOW (ref 6–8.3)
PROTHROM AB SERPL-ACNC: 13 SEC — SIGNIFICANT CHANGE UP (ref 9.9–13.4)
RBC # BLD: 4.35 M/UL — SIGNIFICANT CHANGE UP (ref 3.8–5.2)
RBC # BLD: 4.36 M/UL — SIGNIFICANT CHANGE UP (ref 3.8–5.2)
RBC # FLD: 13.5 % — SIGNIFICANT CHANGE UP (ref 10.3–14.5)
RBC # FLD: 13.7 % — SIGNIFICANT CHANGE UP (ref 10.3–14.5)
SODIUM SERPL-SCNC: 129 MMOL/L — LOW (ref 135–145)
SODIUM SERPL-SCNC: 129 MMOL/L — LOW (ref 135–145)
WBC # BLD: 11.32 K/UL — HIGH (ref 3.8–10.5)
WBC # BLD: 6.89 K/UL — SIGNIFICANT CHANGE UP (ref 3.8–10.5)
WBC # FLD AUTO: 11.32 K/UL — HIGH (ref 3.8–10.5)
WBC # FLD AUTO: 6.89 K/UL — SIGNIFICANT CHANGE UP (ref 3.8–10.5)

## 2024-12-09 PROCEDURE — 99233 SBSQ HOSP IP/OBS HIGH 50: CPT

## 2024-12-09 PROCEDURE — 27245 TREAT THIGH FRACTURE: CPT | Mod: AS,RT

## 2024-12-09 PROCEDURE — 27245 TREAT THIGH FRACTURE: CPT | Mod: RT

## 2024-12-09 PROCEDURE — 99222 1ST HOSP IP/OBS MODERATE 55: CPT | Mod: 57

## 2024-12-09 DEVICE — PIN GUIDE 3.2X444: Type: IMPLANTABLE DEVICE | Status: FUNCTIONAL

## 2024-12-09 DEVICE — IMPLANTABLE DEVICE: Type: IMPLANTABLE DEVICE | Status: FUNCTIONAL

## 2024-12-09 DEVICE — SCREW FEM CORT DIST 5X34MM: Type: IMPLANTABLE DEVICE | Status: FUNCTIONAL

## 2024-12-09 DEVICE — NAIL AFFIXUS HIP FRACTURE 9X180MM 125D: Type: IMPLANTABLE DEVICE | Status: FUNCTIONAL

## 2024-12-09 RX ORDER — ONDANSETRON HYDROCHLORIDE 4 MG/1
4 TABLET, FILM COATED ORAL ONCE
Refills: 0 | Status: DISCONTINUED | OUTPATIENT
Start: 2024-12-09 | End: 2024-12-09

## 2024-12-09 RX ORDER — POLYETHYLENE GLYCOL 3350 17 G/17G
17 POWDER, FOR SOLUTION ORAL DAILY
Refills: 0 | Status: DISCONTINUED | OUTPATIENT
Start: 2024-12-10 | End: 2024-12-15

## 2024-12-09 RX ORDER — CHLORHEXIDINE GLUCONATE 1.2 MG/ML
1 RINSE ORAL ONCE
Refills: 0 | Status: COMPLETED | OUTPATIENT
Start: 2024-12-09 | End: 2024-12-13

## 2024-12-09 RX ORDER — OXYCODONE HYDROCHLORIDE 30 MG/1
5 TABLET ORAL EVERY 4 HOURS
Refills: 0 | Status: DISCONTINUED | OUTPATIENT
Start: 2024-12-10 | End: 2024-12-14

## 2024-12-09 RX ORDER — SENNOSIDES 8.6 MG
2 TABLET ORAL AT BEDTIME
Refills: 0 | Status: DISCONTINUED | OUTPATIENT
Start: 2024-12-10 | End: 2024-12-16

## 2024-12-09 RX ORDER — HYDROMORPHONE HYDROCHLORIDE 2 MG/1
1 TABLET ORAL
Refills: 0 | Status: DISCONTINUED | OUTPATIENT
Start: 2024-12-09 | End: 2024-12-09

## 2024-12-09 RX ORDER — VALSARTAN 320 MG/1
160 TABLET ORAL DAILY
Refills: 0 | Status: DISCONTINUED | OUTPATIENT
Start: 2024-12-10 | End: 2024-12-11

## 2024-12-09 RX ORDER — 0.9 % SODIUM CHLORIDE 0.9 %
1000 INTRAVENOUS SOLUTION INTRAVENOUS
Refills: 0 | Status: DISCONTINUED | OUTPATIENT
Start: 2024-12-09 | End: 2024-12-09

## 2024-12-09 RX ORDER — FENTANYL 12 UG/H
25 PATCH, EXTENDED RELEASE TRANSDERMAL
Refills: 0 | Status: DISCONTINUED | OUTPATIENT
Start: 2024-12-09 | End: 2024-12-09

## 2024-12-09 RX ORDER — POVIDONE-IODINE 7.5 %
1 SPONGE TOPICAL ONCE
Refills: 0 | Status: DISCONTINUED | OUTPATIENT
Start: 2024-12-09 | End: 2024-12-16

## 2024-12-09 RX ORDER — OXYCODONE HYDROCHLORIDE 30 MG/1
2.5 TABLET ORAL EVERY 4 HOURS
Refills: 0 | Status: DISCONTINUED | OUTPATIENT
Start: 2024-12-10 | End: 2024-12-14

## 2024-12-09 RX ORDER — SODIUM CHLORIDE 9 MG/ML
1000 INJECTION, SOLUTION INTRAMUSCULAR; INTRAVENOUS; SUBCUTANEOUS
Refills: 0 | Status: DISCONTINUED | OUTPATIENT
Start: 2024-12-09 | End: 2024-12-10

## 2024-12-09 RX ORDER — ACETAMINOPHEN 500MG 500 MG/1
975 TABLET, COATED ORAL EVERY 6 HOURS
Refills: 0 | Status: DISCONTINUED | OUTPATIENT
Start: 2024-12-10 | End: 2024-12-10

## 2024-12-09 RX ORDER — SODIUM CHLORIDE 9 MG/ML
1000 INJECTION, SOLUTION INTRAMUSCULAR; INTRAVENOUS; SUBCUTANEOUS
Refills: 0 | Status: DISCONTINUED | OUTPATIENT
Start: 2024-12-10 | End: 2024-12-11

## 2024-12-09 RX ORDER — ENOXAPARIN SODIUM 30 MG/.3ML
30 INJECTION SUBCUTANEOUS EVERY 12 HOURS
Refills: 0 | Status: DISCONTINUED | OUTPATIENT
Start: 2024-12-10 | End: 2024-12-16

## 2024-12-09 RX ORDER — ONDANSETRON HYDROCHLORIDE 4 MG/1
4 TABLET, FILM COATED ORAL EVERY 6 HOURS
Refills: 0 | Status: DISCONTINUED | OUTPATIENT
Start: 2024-12-10 | End: 2024-12-16

## 2024-12-09 RX ADMIN — Medication 4 MILLIGRAM(S): at 23:10

## 2024-12-09 RX ADMIN — Medication 2 MILLIGRAM(S): at 00:42

## 2024-12-09 RX ADMIN — Medication 2 MILLIGRAM(S): at 13:30

## 2024-12-09 RX ADMIN — SODIUM CHLORIDE 75 MILLILITER(S): 9 INJECTION, SOLUTION INTRAMUSCULAR; INTRAVENOUS; SUBCUTANEOUS at 14:09

## 2024-12-09 RX ADMIN — Medication 4 MILLIGRAM(S): at 22:10

## 2024-12-09 RX ADMIN — Medication 2 MILLIGRAM(S): at 14:22

## 2024-12-09 RX ADMIN — Medication 4 MILLIGRAM(S): at 05:48

## 2024-12-09 RX ADMIN — METOPROLOL TARTRATE 25 MILLIGRAM(S): 100 TABLET, FILM COATED ORAL at 05:41

## 2024-12-09 RX ADMIN — Medication 4 MILLIGRAM(S): at 22:04

## 2024-12-09 RX ADMIN — Medication 2 MILLIGRAM(S): at 22:04

## 2024-12-09 NOTE — BRIEF OPERATIVE NOTE - NSICDXBRIEFPOSTOP_GEN_ALL_CORE_FT
POST-OP DIAGNOSIS:  Closed intertrochanteric fracture of left hip 09-Dec-2024 18:32:30  Carmelo García   POST-OP DIAGNOSIS:  Intertrochanteric fracture of right hip 09-Dec-2024 18:35:57  Carmelo García

## 2024-12-09 NOTE — PROGRESS NOTE ADULT - SUBJECTIVE AND OBJECTIVE BOX
Patient is a 89y old  Female who presents with a chief complaint of Closed fracture of hip. (09 Dec 2024 08:02)      INTERVAL HPI/OVERNIGHT EVENTS: no new complaints    MEDICATIONS  (STANDING):  chlorhexidine 2% Cloths 1 Application(s) Topical once  metoprolol succinate ER 25 milliGRAM(s) Oral daily  povidone iodine 10% Nasal Swab 1 Application(s) Both Nostrils once  valsartan 160 milliGRAM(s) Oral daily    MEDICATIONS  (PRN):  acetaminophen     Tablet .. 650 milliGRAM(s) Oral every 6 hours PRN Temp greater or equal to 38C (100.4F), Mild Pain (1 - 3)  aluminum hydroxide/magnesium hydroxide/simethicone Suspension 30 milliLiter(s) Oral every 4 hours PRN Dyspepsia  melatonin 3 milliGRAM(s) Oral at bedtime PRN Insomnia  morphine  - Injectable 2 milliGRAM(s) IV Push every 4 hours PRN Moderate Pain (4 - 6)  morphine  - Injectable 4 milliGRAM(s) IV Push every 6 hours PRN Severe Pain (7 - 10)  ondansetron Injectable 4 milliGRAM(s) IV Push every 8 hours PRN Nausea and/or Vomiting      __________________________________________________  REVIEW OF SYSTEMS:    CONSTITUTIONAL: No fever,   EYES: no acute visual disturbances  NECK: No pain or stiffness  RESPIRATORY: No cough; No shortness of breath  CARDIOVASCULAR: No chest pain, no palpitations  GASTROINTESTINAL: No pain. No nausea or vomiting; No diarrhea   NEUROLOGICAL: No headache or numbness, no tremors  MUSCULOSKELETAL: No joint pain, no muscle pain  GENITOURINARY: no dysuria, no frequency, no hesitancy  PSYCHIATRY: no depression , no anxiety  ALL OTHER  ROS negative      Vital Signs Last 24 Hrs  T(C): 37 (09 Dec 2024 05:04), Max: 37 (08 Dec 2024 21:34)  T(F): 98.6 (09 Dec 2024 05:04), Max: 98.6 (08 Dec 2024 21:34)  HR: 88 (09 Dec 2024 05:04) (70 - 88)  BP: 159/57 (09 Dec 2024 05:04) (145/62 - 177/80)  BP(mean): 78 (08 Dec 2024 21:34) (78 - 78)  RR: 18 (09 Dec 2024 05:04) (17 - 18)  SpO2: 100% (09 Dec 2024 05:04) (95% - 100%)    Parameters below as of 09 Dec 2024 05:04  Patient On (Oxygen Delivery Method): room air        ________________________________________________  PHYSICAL EXAM:  GENERAL: NAD  HEENT: Normocephalic;  conjunctivae and sclerae clear; moist mucous membranes;   NECK : supple  CHEST/LUNG: Clear to auscultation bilaterally with good air entry   HEART: S1 S2  regular; no murmurs, gallops or rubs  ABDOMEN: Soft, Nontender, Nondistended; Bowel sounds present  EXTREMITIES: no cyanosis; no edema; no calf tenderness  SKIN: warm and dry; no rash  NERVOUS SYSTEM:  Awake and alert; Oriented  to place, person and time ; no new deficits    _________________________________________________  LABS:                        12.7   6.89  )-----------( 128      ( 09 Dec 2024 06:52 )             37.0     12-09    129[L]  |  95[L]  |  15  ----------------------------<  123[H]  3.9   |  28  |  0.56    Ca    8.6      09 Dec 2024 06:52  Phos  3.4     12-09  Mg     2.2     12-09    TPro  5.8[L]  /  Alb  3.0[L]  /  TBili  0.7  /  DBili  x   /  AST  14  /  ALT  15  /  AlkPhos  64  12-09    PT/INR - ( 09 Dec 2024 06:52 )   PT: 13.0 sec;   INR: 1.12 ratio         PTT - ( 09 Dec 2024 06:52 )  PTT:30.7 sec  Urinalysis Basic - ( 09 Dec 2024 06:52 )    Color: x / Appearance: x / SG: x / pH: x  Gluc: 123 mg/dL / Ketone: x  / Bili: x / Urobili: x   Blood: x / Protein: x / Nitrite: x   Leuk Esterase: x / RBC: x / WBC x   Sq Epi: x / Non Sq Epi: x / Bacteria: x      CAPILLARY BLOOD GLUCOSE          RADIOLOGY & ADDITIONAL TESTS:    Imaging Personally Reviewed:  YES/NO    Consultant(s) Notes Reviewed:   YES/ No    Care Discussed with Consultants :     Plan of care was discussed with patient and /or primary care giver; all questions and concerns were addressed and care was aligned with patient's wishes.       Patient is a 89y old  Female who presents with a chief complaint of Closed fracture of hip. (09 Dec 2024 08:02)      INTERVAL HPI/OVERNIGHT EVENTS: pt seen at bedside with no new complaints, no acute events overnight    MEDICATIONS  (STANDING):  chlorhexidine 2% Cloths 1 Application(s) Topical once  metoprolol succinate ER 25 milliGRAM(s) Oral daily  povidone iodine 10% Nasal Swab 1 Application(s) Both Nostrils once  valsartan 160 milliGRAM(s) Oral daily    MEDICATIONS  (PRN):  acetaminophen     Tablet .. 650 milliGRAM(s) Oral every 6 hours PRN Temp greater or equal to 38C (100.4F), Mild Pain (1 - 3)  aluminum hydroxide/magnesium hydroxide/simethicone Suspension 30 milliLiter(s) Oral every 4 hours PRN Dyspepsia  melatonin 3 milliGRAM(s) Oral at bedtime PRN Insomnia  morphine  - Injectable 2 milliGRAM(s) IV Push every 4 hours PRN Moderate Pain (4 - 6)  morphine  - Injectable 4 milliGRAM(s) IV Push every 6 hours PRN Severe Pain (7 - 10)  ondansetron Injectable 4 milliGRAM(s) IV Push every 8 hours PRN Nausea and/or Vomiting  __________________________________________________  REVIEW OF SYSTEMS:    CONSTITUTIONAL: No fever,   EYES: no acute visual disturbances  NECK: No pain or stiffness  RESPIRATORY: No cough; No shortness of breath  CARDIOVASCULAR: No chest pain, no palpitations  GASTROINTESTINAL: No pain. No nausea or vomiting; No diarrhea   NEUROLOGICAL: No headache or numbness, no tremors  MUSCULOSKELETAL: No joint pain, no muscle pain  GENITOURINARY: no dysuria, no frequency, no hesitancy  PSYCHIATRY: no depression , no anxiety  ALL OTHER  ROS negative      Vital Signs Last 24 Hrs  T(C): 37 (09 Dec 2024 05:04), Max: 37 (08 Dec 2024 21:34)  T(F): 98.6 (09 Dec 2024 05:04), Max: 98.6 (08 Dec 2024 21:34)  HR: 88 (09 Dec 2024 05:04) (70 - 88)  BP: 159/57 (09 Dec 2024 05:04) (145/62 - 177/80)  BP(mean): 78 (08 Dec 2024 21:34) (78 - 78)  RR: 18 (09 Dec 2024 05:04) (17 - 18)  SpO2: 100% (09 Dec 2024 05:04) (95% - 100%)    Parameters below as of 09 Dec 2024 05:04  Patient On (Oxygen Delivery Method): room air        ________________________________________________  PHYSICAL EXAM:  GENERAL: NAD  HEENT: Normocephalic;  conjunctivae and sclerae clear; moist mucous membranes;   NECK : supple  CHEST/LUNG: Clear to auscultation bilaterally with good air entry   HEART: S1 S2  regular; no murmurs, gallops or rubs  ABDOMEN: Soft, Nontender, Nondistended; Bowel sounds present  EXTREMITIES: no cyanosis; no edema; no calf tenderness  SKIN: warm and dry; no rash  NERVOUS SYSTEM:  Awake and alert; Oriented  to place, person and time ; no new deficits    _________________________________________________  LABS:                        12.7   6.89  )-----------( 128      ( 09 Dec 2024 06:52 )             37.0     12-09    129[L]  |  95[L]  |  15  ----------------------------<  123[H]  3.9   |  28  |  0.56    Ca    8.6      09 Dec 2024 06:52  Phos  3.4     12-09  Mg     2.2     12-09    TPro  5.8[L]  /  Alb  3.0[L]  /  TBili  0.7  /  DBili  x   /  AST  14  /  ALT  15  /  AlkPhos  64  12-09    PT/INR - ( 09 Dec 2024 06:52 )   PT: 13.0 sec;   INR: 1.12 ratio         PTT - ( 09 Dec 2024 06:52 )  PTT:30.7 sec  Urinalysis Basic - ( 09 Dec 2024 06:52 )    Color: x / Appearance: x / SG: x / pH: x  Gluc: 123 mg/dL / Ketone: x  / Bili: x / Urobili: x   Blood: x / Protein: x / Nitrite: x   Leuk Esterase: x / RBC: x / WBC x   Sq Epi: x / Non Sq Epi: x / Bacteria: x      CAPILLARY BLOOD GLUCOSE          RADIOLOGY & ADDITIONAL TESTS:    Imaging Personally Reviewed:  YES/NO    Consultant(s) Notes Reviewed:   YES/ No    Care Discussed with Consultants :     Plan of care was discussed with patient and /or primary care giver; all questions and concerns were addressed and care was aligned with patient's wishes.

## 2024-12-09 NOTE — PROGRESS NOTE ADULT - ASSESSMENT
89 yo F, lives alone HHA  8hrs/6 days, ambulates with a walker, with PMH of HTN, Spinal stenosis,  RT Breast CA s/p lumpectomy, radiation and chemo, present with right hip pain s/p unwitnessed mechanical fall. Admitted for intertrochanteric fracture of right hip   12/9: Pt pending OR today for right hip IM nailing with Ortho

## 2024-12-09 NOTE — BRIEF OPERATIVE NOTE - NSICDXBRIEFPREOP_GEN_ALL_CORE_FT
PRE-OP DIAGNOSIS:  Closed intertrochanteric fracture of left hip 09-Dec-2024 18:32:26  Carmelo García   PRE-OP DIAGNOSIS:  Intertrochanteric fracture of right hip 09-Dec-2024 18:35:54  Carmelo García   [Initial] : an initial consultation for [[email or web address]] : rt breast pain

## 2024-12-09 NOTE — PROGRESS NOTE ADULT - PROBLEM SELECTOR PLAN 3
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

## 2024-12-09 NOTE — CONSULT NOTE ADULT - SUBJECTIVE AND OBJECTIVE BOX
Orthopedic Consult  KAELA [id number]  89yFelissa    Diagnosis: 89yFemale with Right Hip Intertrochanteric Fracture    HPI: 89yFemale HPI:  89 yo F, lives alone HHA  8hrs/6 days, ambulates with a walker, with PMH of HTN, Spinal stenosis,  RT Breast CA s/p lumpectomy, radiation and chemo, present with right hip pain s/p unwitnessed mechanical fall. Patient reports that she walked into her bathroom without a walker when she accidentally slipped and fell on her right side. Patient denies any head trauma, LOC, headache, vision change, chest pain, numbness/tingling sensation. Patient reports since the fall she has been  unable to ambulates.      ORTHOPEDIC CONSULT: 89yFemale AAOx4 , from home lives in apartment alone with HHA 6 days per week, no stairs, ambulates at baseline with walker, with a PMHx of breast CA 24 yrs ago chemo and radiation, HTN, who presents to [facility] ED with Right hip pain s/p mechanical fall yesterday AM. Patient states the pain is localized to right hip, non-radiating, exacerbated with movement of the right leg. Patient denies any head trauma, LOC or syncope.  Pt denies Fever, Chest pain, SOB, dyspnea, paresthesias, N/V/D, abdominal pain, syncope, or pain anywhere else.     PAST MEDICAL & SURGICAL HISTORY:  HTN (hypertension)      Breast cancer      Spinal stenosis      H/O breast surgery          Social History:  Lives Alone, HHA  8hrs/ 6 days (08 Dec 2024 18:02)      Allergies    penicillin (Unknown)  Novocain (Unknown)    Intolerances        Vital Signs Last 24 Hrs  T(C): 37 (09 Dec 2024 05:04), Max: 37 (08 Dec 2024 21:34)  T(F): 98.6 (09 Dec 2024 05:04), Max: 98.6 (08 Dec 2024 21:34)  HR: 88 (09 Dec 2024 05:04) (70 - 88)  BP: 159/57 (09 Dec 2024 05:04) (145/62 - 177/80)  BP(mean): 78 (08 Dec 2024 21:34) (78 - 78)  RR: 18 (09 Dec 2024 05:04) (17 - 18)  SpO2: 100% (09 Dec 2024 05:04) (95% - 100%)    Parameters below as of 09 Dec 2024 05:04  Patient On (Oxygen Delivery Method): room air      I&O's Detail      Physical Exam:  General: AAOx3, NAD, resting in bed  Right Hip:  Right leg shortened. Skin is pink and warm. Tender at the fracture site. Decreased ROM of the affected hip due to pain. SILT. NVI. Positive logroll test. Positive heel strike.  Lower extremity: With open 2x2 cm wound to her lateral midcalf, per daughter of patient has been there for many weeks , begins to heal and patient picks at scab opening back up, no drainage, no erythema, no induration, no pus. No calf tenderness, calves are soft. 2+pulses. NVI. 5/5 Strength of FHL/EHL/ADF/APF b/l.  Good capillary refill. Warm, well-perfused.                             14.2   12.52 )-----------( 155      ( 08 Dec 2024 16:30 )             41.3     12-09    129[L]  |  95[L]  |  x   ----------------------------<  123[H]  3.9   |  x   |  x     Ca    9.0      08 Dec 2024 16:30    TPro  x   /  Alb  3.0[L]  /  TBili  x   /  DBili  x   /  AST  x   /  ALT  x   /  AlkPhos  x   12-09    PT/INR - ( 09 Dec 2024 06:52 )   PT: 13.0 sec;   INR: 1.12 ratio         PTT - ( 09 Dec 2024 06:52 )  PTT:30.7 sec    Imaging:    < from: Xray Femur 2 Views, Right (12.08.24 @ 16:16) >    ACC: [id number] EXAM:  XR KNEE AP LAT OBL 3 VIEWS RT   ORDERED BY:  WEI BRYSON     ACC: [id number] EXAM:  XR FEMUR 2 VIEWS RT   ORDERED BY:  WEI BRYSON     ACC: [id number] EXAM:  XR CHEST PORTABLE URGENT 1V   ORDERED BY:  WEI BRYSON     ACC: [id number] EXAM:  XR HIP WITH PELV 1V RT   ORDERED BY:  WEI BRYSON     *** ADDENDUM # 1 ***    ADDENDUM.    Right hip with pelvis, right femur, and right knee findings are as   follows.    Right hip with pelvis. 3 views.    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 There is mild   symmetric hip degeneration and arterial calcification.    There is an intertrochanteric fracture of the right hip with increased   angulation at the fracture site.    Right femur. 4 views. Lower femur are intact.    Right knee. 3 views. No effusion. Arterial calcifications. Diffuse   moderate degeneration. No fracture.    Intertrochanteric fracture right hip.    --- End of Report ---    *** END OF ADDENDUM # 1 ***      PROCEDURE DATE:  12/08/2024          INTERPRETATION:  AP semierect chest on December 8, 2024 at 3:43 PM.   Patient had trauma.    Elevated diaphragms crowds the chest.    Heart magnified by technique.    No definite acute finding or visible fracture.    Findings are similar to April 14, 2021.    IMPRESSION: No acute finding.    --- End of Report ---    ***Please see the addendum at the top of this report. It may contain   additional important information or changes.****        MARIA D JURADO MD; Attending Radiologist  This document has been electronically signed. Dec  8 2024  4:19PM  1st Addendum: MARIA D JURADO MD; Attending Radiologist  The first addendum was electronically signed on: Dec  8 2024  6:29PM.    < end of copied text >        Impression:  89yFemale with Right Hip Intertrochanteric Fracture    Plan:  -  Pain management  -  Type and screen pending  -  Will cover right calf wound with sterile dressing   -  DVT prophylaxis with Venodynes   > Hold all chemical anticoagulation today for surgery  -  Keep NPO today for surgery  -  Fracture care with bedrest now, NWB of the affected extremity  -  Surgeon:  Dr. Landers  -  Procedure:  Right hip intramedullary nailing  -  Medical Optimization/clearance from medicine team appreciated  -  Chlorhexadine wipe and Povidone Iodine nasal swabs ordered   -  Consent: Pending  -  Case d/w Dr. Landers

## 2024-12-09 NOTE — PROGRESS NOTE ADULT - PROBLEM SELECTOR PLAN 4
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

## 2024-12-09 NOTE — CONSULT NOTE ADULT - NS ATTEND AMEND GEN_ALL_CORE FT
I, Ion Landers the attending physician, reviewed the above history and physical.  I have reviewed the appropriate medical and surgical history and reviewed the imaging independently as well performed an independent physical exam.  I have reviewed the above note and agree with the its findings and the plan as written with any exceptions noted below.     R intertroch fracture - XR R hip- R it fx personal read  - REcommend IMN R femur    We discussed the risk and benefits of operative and nonoperative treatment options with the patient. Risks of surgery were discussed and include, but are not limited to, pain, infection, bleeding, nonunion, malunion, instability / dislocation, symptomatic hardware, limb length discrepancy, loss of motion, loss of strength, loss of function, need for revision surgery, damage to nerves and/or blood vessels, anesthetic risks, DVT, PE, MI, CVA, and even death. All questions were answered and informed consent was signed using the teach back method.    Ion Landers MD  Attending Orthopedic Surgeon

## 2024-12-09 NOTE — BRIEF OPERATIVE NOTE - NSICDXBRIEFPROCEDURE_GEN_ALL_CORE_FT
PROCEDURES:  Open reduction and internal fixation (ORIF) of intertrochanteric fracture of left femur 09-Dec-2024 18:32:14  Carmelo García   PROCEDURES:  Insertion of locking intramedullary bola into right hip 09-Dec-2024 18:35:41  Carmelo García

## 2024-12-09 NOTE — PROGRESS NOTE ADULT - PROBLEM SELECTOR PLAN 1
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  -Xray RT Femur:  Lower femur are intact.  -Xray RT knee : No effusion. Arterial calcifications. Diffuse moderate degeneration. No fracture  -Ortho following   -Pain managment: Tylenol for mild pain, Morphine 2/4mg for mod/sev pain  -NPO   - Fall precaution  - Pending OR for right hip IM nailing  - PT consult when cleared

## 2024-12-10 DIAGNOSIS — M25.552 PAIN IN LEFT HIP: ICD-10-CM

## 2024-12-10 DIAGNOSIS — M48.00 SPINAL STENOSIS, SITE UNSPECIFIED: ICD-10-CM

## 2024-12-10 DIAGNOSIS — M25.551 PAIN IN RIGHT HIP: ICD-10-CM

## 2024-12-10 LAB
ANION GAP SERPL CALC-SCNC: 6 MMOL/L — SIGNIFICANT CHANGE UP (ref 5–17)
BASOPHILS # BLD AUTO: 0.02 K/UL — SIGNIFICANT CHANGE UP (ref 0–0.2)
BASOPHILS NFR BLD AUTO: 0.2 % — SIGNIFICANT CHANGE UP (ref 0–2)
BUN SERPL-MCNC: 17 MG/DL — SIGNIFICANT CHANGE UP (ref 7–18)
CALCIUM SERPL-MCNC: 8.9 MG/DL — SIGNIFICANT CHANGE UP (ref 8.4–10.5)
CHLORIDE SERPL-SCNC: 97 MMOL/L — SIGNIFICANT CHANGE UP (ref 96–108)
CO2 SERPL-SCNC: 27 MMOL/L — SIGNIFICANT CHANGE UP (ref 22–31)
CREAT SERPL-MCNC: 0.75 MG/DL — SIGNIFICANT CHANGE UP (ref 0.5–1.3)
EGFR: 75 ML/MIN/1.73M2 — SIGNIFICANT CHANGE UP
EOSINOPHIL # BLD AUTO: 0 K/UL — SIGNIFICANT CHANGE UP (ref 0–0.5)
EOSINOPHIL NFR BLD AUTO: 0 % — SIGNIFICANT CHANGE UP (ref 0–6)
GLUCOSE SERPL-MCNC: 157 MG/DL — HIGH (ref 70–99)
HCT VFR BLD CALC: 39 % — SIGNIFICANT CHANGE UP (ref 34.5–45)
HGB BLD-MCNC: 13 G/DL — SIGNIFICANT CHANGE UP (ref 11.5–15.5)
IMM GRANULOCYTES NFR BLD AUTO: 0.5 % — SIGNIFICANT CHANGE UP (ref 0–0.9)
LYMPHOCYTES # BLD AUTO: 0.76 K/UL — LOW (ref 1–3.3)
LYMPHOCYTES # BLD AUTO: 9.2 % — LOW (ref 13–44)
MCHC RBC-ENTMCNC: 29 PG — SIGNIFICANT CHANGE UP (ref 27–34)
MCHC RBC-ENTMCNC: 33.3 G/DL — SIGNIFICANT CHANGE UP (ref 32–36)
MCV RBC AUTO: 86.9 FL — SIGNIFICANT CHANGE UP (ref 80–100)
MONOCYTES # BLD AUTO: 0.86 K/UL — SIGNIFICANT CHANGE UP (ref 0–0.9)
MONOCYTES NFR BLD AUTO: 10.4 % — SIGNIFICANT CHANGE UP (ref 2–14)
NEUTROPHILS # BLD AUTO: 6.55 K/UL — SIGNIFICANT CHANGE UP (ref 1.8–7.4)
NEUTROPHILS NFR BLD AUTO: 79.7 % — HIGH (ref 43–77)
NRBC # BLD: 0 /100 WBCS — SIGNIFICANT CHANGE UP (ref 0–0)
PLATELET # BLD AUTO: 113 K/UL — LOW (ref 150–400)
POTASSIUM SERPL-MCNC: 5.1 MMOL/L — SIGNIFICANT CHANGE UP (ref 3.5–5.3)
POTASSIUM SERPL-SCNC: 5.1 MMOL/L — SIGNIFICANT CHANGE UP (ref 3.5–5.3)
RBC # BLD: 4.49 M/UL — SIGNIFICANT CHANGE UP (ref 3.8–5.2)
RBC # FLD: 13.6 % — SIGNIFICANT CHANGE UP (ref 10.3–14.5)
SODIUM SERPL-SCNC: 130 MMOL/L — LOW (ref 135–145)
WBC # BLD: 8.23 K/UL — SIGNIFICANT CHANGE UP (ref 3.8–10.5)
WBC # FLD AUTO: 8.23 K/UL — SIGNIFICANT CHANGE UP (ref 3.8–10.5)

## 2024-12-10 PROCEDURE — 99233 SBSQ HOSP IP/OBS HIGH 50: CPT

## 2024-12-10 RX ORDER — CYANOCOBALAMIN/FOLIC AC/VIT B6 1-2.2-25MG
1 TABLET ORAL
Refills: 0 | DISCHARGE

## 2024-12-10 RX ORDER — ACETAMINOPHEN 500MG 500 MG/1
1000 TABLET, COATED ORAL EVERY 8 HOURS
Refills: 0 | Status: COMPLETED | OUTPATIENT
Start: 2024-12-10 | End: 2024-12-13

## 2024-12-10 RX ORDER — METOPROLOL TARTRATE 100 MG/1
1 TABLET, FILM COATED ORAL
Refills: 0 | DISCHARGE

## 2024-12-10 RX ORDER — LIDOCAINE 40 MG/G
1 CREAM TOPICAL DAILY
Refills: 0 | Status: DISCONTINUED | OUTPATIENT
Start: 2024-12-10 | End: 2024-12-16

## 2024-12-10 RX ORDER — CEFAZOLIN SODIUM 10 G
2000 VIAL (EA) INJECTION EVERY 8 HOURS
Refills: 0 | Status: COMPLETED | OUTPATIENT
Start: 2024-12-10 | End: 2024-12-10

## 2024-12-10 RX ORDER — CYANOCOBALAMIN/FOLIC AC/VIT B6 1-2.2-25MG
1 TABLET ORAL DAILY
Refills: 0 | Status: DISCONTINUED | OUTPATIENT
Start: 2024-12-10 | End: 2024-12-16

## 2024-12-10 RX ADMIN — ACETAMINOPHEN 500MG 1000 MILLIGRAM(S): 500 TABLET, COATED ORAL at 21:27

## 2024-12-10 RX ADMIN — POLYETHYLENE GLYCOL 3350 17 GRAM(S): 17 POWDER, FOR SOLUTION ORAL at 11:11

## 2024-12-10 RX ADMIN — Medication 2 TABLET(S): at 21:27

## 2024-12-10 RX ADMIN — Medication 20 MILLIGRAM(S): at 21:26

## 2024-12-10 RX ADMIN — ACETAMINOPHEN 500MG 1000 MILLIGRAM(S): 500 TABLET, COATED ORAL at 15:00

## 2024-12-10 RX ADMIN — Medication 1 TABLET(S): at 12:40

## 2024-12-10 RX ADMIN — ACETAMINOPHEN 500MG 1000 MILLIGRAM(S): 500 TABLET, COATED ORAL at 13:40

## 2024-12-10 RX ADMIN — METOPROLOL TARTRATE 25 MILLIGRAM(S): 100 TABLET, FILM COATED ORAL at 05:50

## 2024-12-10 RX ADMIN — ENOXAPARIN SODIUM 30 MILLIGRAM(S): 30 INJECTION SUBCUTANEOUS at 20:16

## 2024-12-10 RX ADMIN — LIDOCAINE 1 PATCH: 40 CREAM TOPICAL at 17:08

## 2024-12-10 RX ADMIN — LIDOCAINE 1 PATCH: 40 CREAM TOPICAL at 23:25

## 2024-12-10 RX ADMIN — Medication 100 MILLIGRAM(S): at 08:21

## 2024-12-10 RX ADMIN — ENOXAPARIN SODIUM 30 MILLIGRAM(S): 30 INJECTION SUBCUTANEOUS at 07:58

## 2024-12-10 RX ADMIN — OXYCODONE HYDROCHLORIDE 5 MILLIGRAM(S): 30 TABLET ORAL at 11:11

## 2024-12-10 RX ADMIN — OXYCODONE HYDROCHLORIDE 2.5 MILLIGRAM(S): 30 TABLET ORAL at 23:30

## 2024-12-10 RX ADMIN — OXYCODONE HYDROCHLORIDE 5 MILLIGRAM(S): 30 TABLET ORAL at 11:58

## 2024-12-10 RX ADMIN — LIDOCAINE 1 PATCH: 40 CREAM TOPICAL at 12:40

## 2024-12-10 RX ADMIN — Medication 100 MILLIGRAM(S): at 01:08

## 2024-12-10 NOTE — PROGRESS NOTE ADULT - SUBJECTIVE AND OBJECTIVE BOX
KAELA DRAKE  89yFemale [id number]      Diagnosis:  S/p Right Hip IM nail POD#0    Patient is seen and evaluated at bedside with her daughter present. Patient with no acute complaints at this time   Patient is sitting up eating breakfast during exam  Pain is mild; well controlled with current pain regiment, states only with minimal pain to right hip , no other complaints of pain  Awaiting PT for ambulation, is nervous to ambulate on right leg but is excited to begin moving  Pt denies Fever, Chest pain, SOB, dyspnea, paresthesias, N/V/D, abdominal pain, syncope, or pain anywhere else.       Vital Signs Last 24 Hrs  T(C): 36.4 (10 Dec 2024 05:05), Max: 37 (09 Dec 2024 13:05)  T(F): 97.6 (10 Dec 2024 05:05), Max: 98.6 (09 Dec 2024 13:05)  HR: 70 (10 Dec 2024 05:05) (56 - 89)  BP: 145/68 (10 Dec 2024 05:05) (122/66 - 148/74)  BP(mean): 85 (09 Dec 2024 21:48) (65 - 92)  RR: 18 (10 Dec 2024 05:05) (11 - 22)  SpO2: 100% (10 Dec 2024 05:05) (95% - 100%)    Parameters below as of 09 Dec 2024 21:48  Patient On (Oxygen Delivery Method): nasal cannula  O2 Flow (L/min): 2    I&O's Summary    09 Dec 2024 07:01  -  10 Dec 2024 07:00  --------------------------------------------------------  IN: 700 mL / OUT: 750 mL / NET: -50 mL        Physical Exam:  General: AAOx3, in NAD, resting comfortably in bed.  Right Hip: Tegaderm and 4x4 Dressing is C/D/I. Skin is pink and warm. No erythema. SILT.  Wound with no drainage, healing well, no signs of active bleeding   Lower extremity:  No calf tenderness, calves are soft. 2+pulses. NVI. 5/5 Strength of EHL/FHL/ADF/APF b/l.  Good capillary refill. Warm, well-perfused.                           13.0   8.23  )-----------( 113      ( 10 Dec 2024 07:47 )             39.0     12-10    130[L]  |  97  |  17  ----------------------------<  157[H]  5.1   |  27  |  0.75    Ca    8.9      10 Dec 2024 07:47  Phos  3.4     12-09  Mg     2.2     12-09    TPro  5.8[L]  /  Alb  3.0[L]  /  TBili  0.7  /  DBili  x   /  AST  14  /  ALT  15  /  AlkPhos  64  12-09      Impression:   89yFemaleS/p Right Hip Hemiarthroplasty POD#0  Plan:  -  Pain management  -  Hyponatremia management per medicine team  -  Urinary retention: patient did not void postop, bladder scan >600, was straight cath x1.    >  Will continue to monitor after PT and breakfast for void, if must cath 2 more times will leave indwelling catheter    >  Per patients daughter she does not void often only few times per day, will encourage PO fluids and ambulation to help with void  -  DVT prophylaxis with Lovenox and venodynes  -  Daily Physical Therapy:  WBAT on RLE with appropriate assistive device  -  Discharge planning: Home Vs. Rehab pending PT  -  Continue Antibiotics x 24hrs post-op  -  Encouraged use of incentive spirometer  -  Case d/w Dr. Landers         KAELA DRAKE  89yFemale [id number]      Diagnosis:  S/p Right Hip IM nail POD#1    Patient is seen and evaluated at bedside with her daughter present. Patient with no acute complaints at this time   Patient is sitting up eating breakfast during exam  Pain is mild; well controlled with current pain regiment, states only with minimal pain to right hip , no other complaints of pain  Awaiting PT for ambulation, is nervous to ambulate on right leg but is excited to begin moving  Pt denies Fever, Chest pain, SOB, dyspnea, paresthesias, N/V/D, abdominal pain, syncope, or pain anywhere else.       Vital Signs Last 24 Hrs  T(C): 36.4 (10 Dec 2024 05:05), Max: 37 (09 Dec 2024 13:05)  T(F): 97.6 (10 Dec 2024 05:05), Max: 98.6 (09 Dec 2024 13:05)  HR: 70 (10 Dec 2024 05:05) (56 - 89)  BP: 145/68 (10 Dec 2024 05:05) (122/66 - 148/74)  BP(mean): 85 (09 Dec 2024 21:48) (65 - 92)  RR: 18 (10 Dec 2024 05:05) (11 - 22)  SpO2: 100% (10 Dec 2024 05:05) (95% - 100%)    Parameters below as of 09 Dec 2024 21:48  Patient On (Oxygen Delivery Method): nasal cannula  O2 Flow (L/min): 2    I&O's Summary    09 Dec 2024 07:01  -  10 Dec 2024 07:00  --------------------------------------------------------  IN: 700 mL / OUT: 750 mL / NET: -50 mL        Physical Exam:  General: AAOx3, in NAD, resting comfortably in bed.  Right Hip: Tegaderm and 4x4 Dressing is C/D/I. Skin is pink and warm. No erythema. SILT.  Wound with no drainage, healing well, no signs of active bleeding   Lower extremity:  No calf tenderness, calves are soft. 2+pulses. NVI. 5/5 Strength of EHL/FHL/ADF/APF b/l.  Good capillary refill. Warm, well-perfused.                           13.0   8.23  )-----------( 113      ( 10 Dec 2024 07:47 )             39.0     12-10    130[L]  |  97  |  17  ----------------------------<  157[H]  5.1   |  27  |  0.75    Ca    8.9      10 Dec 2024 07:47  Phos  3.4     12-09  Mg     2.2     12-09    TPro  5.8[L]  /  Alb  3.0[L]  /  TBili  0.7  /  DBili  x   /  AST  14  /  ALT  15  /  AlkPhos  64  12-09      Impression:   89yFemaleS/p Right Hip IM nail POD#1  Plan:  -  Pain management  -  Hyponatremia management per medicine team  -  Urinary retention: patient did not void postop, bladder scan >600, was straight cath x1.    >  Will continue to monitor after PT and breakfast for void, if must cath 2 more times will leave indwelling catheter    >  Per patients daughter she does not void often only few times per day, will encourage PO fluids and ambulation to help with void  -  DVT prophylaxis with Lovenox and venodynes  -  Daily Physical Therapy:  WBAT on RLE with appropriate assistive device  -  Discharge planning: Home Vs. Rehab pending PT  -  Continue Antibiotics x 24hrs post-op  -  Encouraged use of incentive spirometer  -  Case d/w Dr. Landers

## 2024-12-10 NOTE — PROGRESS NOTE ADULT - SUBJECTIVE AND OBJECTIVE BOX
NP Note discussed with  Primary Attending    Patient is a 89y old  Female who presents with a chief complaint of Closed fracture of hip. (10 Dec 2024 10:51).  Pt. seen and examined, daughter in attendance.  Pt. appears comfortable with good pain control.  Discussed with pt. and daughter continued plan of care with likely PT recc for PAVITHRA.  Both communicate agreement with pt. stating she would like to go to the facility her  was a few yrs ago after similar surgery.        INTERVAL HPI/OVERNIGHT EVENTS: no new complaints    MEDICATIONS  (STANDING):  acetaminophen     Tablet .. 1000 milliGRAM(s) Oral every 8 hours  atorvastatin 20 milliGRAM(s) Oral at bedtime  chlorhexidine 2% Cloths 1 Application(s) Topical once  enoxaparin Injectable 30 milliGRAM(s) SubCutaneous every 12 hours  lidocaine   4% Patch 1 Patch Transdermal daily  metoprolol succinate ER 25 milliGRAM(s) Oral daily  multivitamin 1 Tablet(s) Oral daily  polyethylene glycol 3350 17 Gram(s) Oral daily  povidone iodine 10% Nasal Swab 1 Application(s) Both Nostrils once  senna 2 Tablet(s) Oral at bedtime  sodium chloride 0.9%. 1000 milliLiter(s) (125 mL/Hr) IV Continuous <Continuous>  valsartan 160 milliGRAM(s) Oral daily    MEDICATIONS  (PRN):  aluminum hydroxide/magnesium hydroxide/simethicone Suspension 30 milliLiter(s) Oral every 4 hours PRN Dyspepsia  magnesium hydroxide Suspension 30 milliLiter(s) Oral daily PRN Constipation  melatonin 3 milliGRAM(s) Oral at bedtime PRN Insomnia  morphine  - Injectable 2 milliGRAM(s) IV Push every 4 hours PRN Moderate Pain (4 - 6)  ondansetron Injectable 4 milliGRAM(s) IV Push every 8 hours PRN Nausea and/or Vomiting  ondansetron Injectable 4 milliGRAM(s) IV Push every 6 hours PRN Nausea and/or Vomiting  oxyCODONE    IR 2.5 milliGRAM(s) Oral every 4 hours PRN Moderate Pain (4 - 6)  oxyCODONE    IR 5 milliGRAM(s) Oral every 4 hours PRN Severe Pain (7 - 10)      __________________________________________________  REVIEW OF SYSTEMS:    CONSTITUTIONAL: No fever,   EYES: no acute visual disturbances  NECK: No pain or stiffness  RESPIRATORY: No cough; No shortness of breath  CARDIOVASCULAR: No chest pain, no palpitations  GASTROINTESTINAL: No pain. No nausea or vomiting; No diarrhea   NEUROLOGICAL: No headache or numbness, no tremors  MUSCULOSKELETAL: No joint pain, no muscle pain  GENITOURINARY: no dysuria, no frequency, no hesitancy  PSYCHIATRY: no depression , no anxiety  ALL OTHER  ROS negative        Vital Signs Last 24 Hrs  T(C): 36.4 (10 Dec 2024 05:05), Max: 37 (09 Dec 2024 13:05)  T(F): 97.6 (10 Dec 2024 05:05), Max: 98.6 (09 Dec 2024 13:05)  HR: 85 (10 Dec 2024 10:17) (56 - 89)  BP: 114/62 (10 Dec 2024 10:17) (114/62 - 148/74)  BP(mean): 85 (09 Dec 2024 21:48) (65 - 92)  RR: 18 (10 Dec 2024 05:05) (11 - 22)  SpO2: 100% (10 Dec 2024 05:05) (95% - 100%)    Parameters below as of 09 Dec 2024 21:48  Patient On (Oxygen Delivery Method): nasal cannula  O2 Flow (L/min): 2      ________________________________________________  PHYSICAL EXAM:  Well developed, pleasant.  GENERAL: NAD  HEENT: Normocephalic;  conjunctivae and sclerae clear; moist mucous membranes;   NECK : supple  CHEST/LUNG: Clear to auscultation bilaterally with good air entry   HEART: S1 S2  regular; no murmurs, gallops or rubs  ABDOMEN: Soft, Nontender, Nondistended; Bowel sounds present  EXTREMITIES: Right hip drsg C/D/I, no cyanosis; no edema; no calf tenderness  SKIN: warm and dry; no rash  NERVOUS SYSTEM:  Awake and alert; Oriented  to place, person and time ; no new deficits    _________________________________________________  LABS:                        13.0   8.23  )-----------( 113      ( 10 Dec 2024 07:47 )             39.0     12-10    130[L]  |  97  |  17  ----------------------------<  157[H]  5.1   |  27  |  0.75    Ca    8.9      10 Dec 2024 07:47  Phos  3.4     12-09  Mg     2.2     12-09    TPro  5.8[L]  /  Alb  3.0[L]  /  TBili  0.7  /  DBili  x   /  AST  14  /  ALT  15  /  AlkPhos  64  12-09    PT/INR - ( 09 Dec 2024 06:52 )   PT: 13.0 sec;   INR: 1.12 ratio         PTT - ( 09 Dec 2024 06:52 )  PTT:30.7 sec  Urinalysis Basic - ( 10 Dec 2024 07:47 )    Color: x / Appearance: x / SG: x / pH: x  Gluc: 157 mg/dL / Ketone: x  / Bili: x / Urobili: x   Blood: x / Protein: x / Nitrite: x   Leuk Esterase: x / RBC: x / WBC x   Sq Epi: x / Non Sq Epi: x / Bacteria: x      CAPILLARY BLOOD GLUCOSE    RADIOLOGY & ADDITIONAL TESTS:        < from: Xray Femur 2 Views, Right (12.08.24 @ 16:16) >    ACC: [id number] EXAM:  XR KNEE AP LAT OBL 3 VIEWS RT   ORDERED BY:  WEI BRYSON     ACC: [id number] EXAM:  XR FEMUR 2 VIEWS RT   ORDERED BY:  WEI BRYSON     ACC: [id number] EXAM:  XR CHEST PORTABLE URGENT 1V   ORDERED BY:  WEI BRYSON     ACC: [id number] EXAM:  XR HIP WITH PELV 1V RT   ORDERED BY:  WEI BRYSON     *** ADDENDUM # 1 ***    ADDENDUM.    Right hip with pelvis, right femur, and right knee findings are as   follows.    Right hip with pelvis. 3 views.    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 There is mild   symmetric hip degeneration and arterial calcification.    There is an intertrochanteric fracture of the right hip with increased   angulation at the fracture site.    Right femur. 4 views. Lower femur are intact.    Right knee. 3 views. No effusion. Arterial calcifications. Diffuse   moderate degeneration. No fracture.    Intertrochanteric fracture right hip.    --- End of Report ---    *** END OF ADDENDUM # 1 ***      PROCEDURE DATE:  12/08/2024          INTERPRETATION:  AP semierect chest on December 8, 2024 at 3:43 PM.   Patient had trauma.    Elevated diaphragms crowds the chest.    Heart magnified by technique.    No definite acute finding or visible fracture.    Findings are similar to April 14, 2021.    IMPRESSION: No acute finding.    --- End of Report ---    < end of copied text >        Imaging Personally Reviewed:  YES/NO    Consultant(s) Notes Reviewed:   YES/ No    Care Discussed with Consultants :     Plan of care was discussed with patient and /or primary care giver; all questions and concerns were addressed and care was aligned with patient's wishes.

## 2024-12-10 NOTE — CONSULT NOTE ADULT - SUBJECTIVE AND OBJECTIVE BOX
Source of information: KAELA DRAKE, Chart review  Patient language: English  : n/a    HPI:  89 yo F, lives alone HHA  8hrs/6 days, ambulates with a walker, with PMH of HTN, Spinal stenosis,  RT Breast CA s/p lumpectomy, radiation and chemo, present with right hip pain s/p unwitnessed mechanical fall. Patient reports that she walked into her bathroom without a walker when she accidentally slipped and fell on her right side. Patient denies any head trauma, LOC, headache, vision change, chest pain, numbness/tingling sensation. Patient reports since the fall she has been  unable to ambulates.    In the ED,   v/s: 177/80, 73, 97.4F, 96% RA  Labs: WBC 12, Hgb 14  EK BPM, NSR  CXR: negative; There is lower lumbar degeneration and left lumbar curve  Xray RT Hip:  There is an intertrochanteric fracture of the right hip with increased  angulation at the fracture site.  Xray RT Femur:  Lower femur are intact.  Xray RT knee : No effusion. Arterial calcifications. Diffuse moderate degeneration. No fracture  s/p Morphine 2mg  s/p bolus   (08 Dec 2024 18:02)      Patient is a 89y old  Female who presents with a chief complaint of Closed fracture of hip. (10 Dec 2024 09:02)    X-ray of the pelvis demonstrated an intertrochanteric fracture of the right hip with increased angulation at the fracture site.      Pt is admitted s/p mechanical fall with an intertrochanteric fracture of the right hip.  Pt is s/p 12/ insertion of locking intramedullary bola into right hip POD #1.  Pain service consulted for management of right hip pain. Pt seen and examined at bedside. Reports pain score ***  SCALE USED: (1-10 VNRS). Pt describes pain as .... radiating to... alleviated by pain medication... exacerbated by movement... Pt tolerating PO diet. Denies lethargy, chest pain, SOB, nausea, vomiting, constipation. Reports last BM ***. Patient stated goal for pain control: to be able to take deep breaths, get out of bed to chair and ambulate with tolerable pain control. Per IStop review pt takes Tramadol for pain at home.     PAST MEDICAL & SURGICAL HISTORY:  HTN (hypertension)    Breast cancer    Spinal stenosis    H/O breast surgery    FAMILY HISTORY:  No pertinent family history in first degree relatives    Social History:  Lives Alone, HHA  8hrs/ 6 days (08 Dec 2024 18:02)  [X] Denies ETOH use, illicit drug use and smoking    Allergies  penicillin (Unknown)  Novocain (Unknown)    MEDICATIONS  (STANDING):  acetaminophen     Tablet .. 975 milliGRAM(s) Oral every 6 hours  atorvastatin 20 milliGRAM(s) Oral at bedtime  chlorhexidine 2% Cloths 1 Application(s) Topical once  enoxaparin Injectable 30 milliGRAM(s) SubCutaneous every 12 hours  metoprolol succinate ER 25 milliGRAM(s) Oral daily  multivitamin 1 Tablet(s) Oral daily  polyethylene glycol 3350 17 Gram(s) Oral daily  povidone iodine 10% Nasal Swab 1 Application(s) Both Nostrils once  senna 2 Tablet(s) Oral at bedtime  sodium chloride 0.9%. 1000 milliLiter(s) (125 mL/Hr) IV Continuous <Continuous>  valsartan 160 milliGRAM(s) Oral daily    MEDICATIONS  (PRN):  acetaminophen     Tablet .. 650 milliGRAM(s) Oral every 6 hours PRN Temp greater or equal to 38C (100.4F), Mild Pain (1 - 3)  aluminum hydroxide/magnesium hydroxide/simethicone Suspension 30 milliLiter(s) Oral every 4 hours PRN Dyspepsia  magnesium hydroxide Suspension 30 milliLiter(s) Oral daily PRN Constipation  melatonin 3 milliGRAM(s) Oral at bedtime PRN Insomnia  morphine  - Injectable 2 milliGRAM(s) IV Push every 4 hours PRN Moderate Pain (4 - 6)  morphine  - Injectable 4 milliGRAM(s) IV Push every 6 hours PRN Severe Pain (7 - 10)  ondansetron Injectable 4 milliGRAM(s) IV Push every 8 hours PRN Nausea and/or Vomiting  ondansetron Injectable 4 milliGRAM(s) IV Push every 6 hours PRN Nausea and/or Vomiting  oxyCODONE    IR 2.5 milliGRAM(s) Oral every 4 hours PRN Moderate Pain (4 - 6)  oxyCODONE    IR 5 milliGRAM(s) Oral every 4 hours PRN Severe Pain (7 - 10)      Vital Signs Last 24 Hrs  T(C): 36.4 (10 Dec 2024 05:05), Max: 37 (09 Dec 2024 13:05)  T(F): 97.6 (10 Dec 2024 05:05), Max: 98.6 (09 Dec 2024 13:05)  HR: 85 (10 Dec 2024 10:17) (56 - 89)  BP: 114/62 (10 Dec 2024 10:17) (114/62 - 148/74)  BP(mean): 85 (09 Dec 2024 21:48) (65 - 92)  RR: 18 (10 Dec 2024 05:05) (11 - 22)  SpO2: 100% (10 Dec 2024 05:05) (95% - 100%)    Parameters below as of 09 Dec 2024 21:48  Patient On (Oxygen Delivery Method): nasal cannula  O2 Flow (L/min): 2    LABS: Reviewed.                        13.0   8.23  )-----------( 113      ( 10 Dec 2024 07:47 )             39.0     12-10    130[L]  |  97  |  17  ----------------------------<  157[H]  5.1   |  27  |  0.75    Ca    8.9      10 Dec 2024 07:47  Phos  3.4     12-09  Mg     2.2     12-09    TPro  5.8[L]  /  Alb  3.0[L]  /  TBili  0.7  /  DBili  x   /  AST  14  /  ALT  15  /  AlkPhos  64  12-09    PT/INR - ( 09 Dec 2024 06:52 )   PT: 13.0 sec;   INR: 1.12 ratio         PTT - ( 09 Dec 2024 06:52 )  PTT:30.7 sec  LIVER FUNCTIONS - ( 09 Dec 2024 06:52 )  Alb: 3.0 g/dL / Pro: 5.8 g/dL / ALK PHOS: 64 U/L / ALT: 15 U/L DA / AST: 14 U/L / GGT: x           Urinalysis Basic - ( 10 Dec 2024 07:47 )    Color: x / Appearance: x / SG: x / pH: x  Gluc: 157 mg/dL / Ketone: x  / Bili: x / Urobili: x   Blood: x / Protein: x / Nitrite: x   Leuk Esterase: x / RBC: x / WBC x   Sq Epi: x / Non Sq Epi: x / Bacteria: x    Radiology: Reviewed.   < from: Xray Femur 2 Views, Right (24 @ 16:16) >    ACC: [id number] EXAM:  XR KNEE AP LAT OBL 3 VIEWS RT   ORDERED BY:  WEI BRYSON     ACC: [id number] EXAM:  XR FEMUR 2 VIEWS RT   ORDERED BY:  WEI BRYSON     ACC: [id number] EXAM:  XR CHEST PORTABLE URGENT 1V   ORDERED BY:  WEI BRYSON     ACC: [id number] EXAM:  XR HIP WITH PELV 1V RT   ORDERED BY:  WEI BRYSON     *** ADDENDUM # 1 ***    ADDENDUM.    Right hip with pelvis, right femur, and right knee findings are as   follows.    Right hip with pelvis. 3 views.    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 There is mild   symmetric hip degeneration and arterial calcification.    There is an intertrochanteric fracture of the right hip with increased   angulation at the fracture site.    Right femur. 4 views. Lower femur are intact.    Right knee. 3 views. No effusion. Arterial calcifications. Diffuse   moderate degeneration. No fracture.    Intertrochanteric fracture right hip.    --- End of Report ---    *** END OF ADDENDUM # 1 ***      PROCEDURE DATE:  2024          INTERPRETATION:  AP semierect chest on 2024 at 3:43 PM.   Patient had trauma.    Elevated diaphragms crowds the chest.    Heart magnified by technique.    No definite acute finding or visible fracture.    Findings are similar to 2021.    IMPRESSION: No acute finding.    --- End of Report ---    ***Please see the addendum at the top of this report. It may contain   additional important information or changes.****        MARIA D JURADO MD; Attending Radiologist  This document has been electronically signed. Dec  8 2024  4:19PM  1st Addendum: MARIA D JURADO MD; Attending Radiologist  The first addendum was electronically signed on: Dec  8 2024  6:29PM.    < end of copied text >      ORT Score -   Family Hx of substance abuse	Female	      Male  Alcohol 	                                           1                     3  Illegal drugs	                                   2                     3  Rx drugs                                           4 	                  4  Personal Hx of substance abuse		  Alcohol 	                                          3	                  3  Illegal drugs                                     4	                  4  Rx drugs                                            5 	                  5  Age between 16- 45 years	           1                     1  hx preadolescent sexual abuse	   3 	                  0  Psychological disease		  ADD, OCD, bipolar, schizophrenia   2	          2  Depression                                           1 	          1  Total: 0    a score of 3 or lower indicates low risk for opioid abuse		  a score of 4-7 indicates moderate risk for opioid abuse		  a score of 8 or higher indicates high risk for opioid abuse    REVIEW OF SYSTEMS:  CONSTITUTIONAL: No fever or fatigue  HEENT:  No difficulty hearing, no change in vision  NECK: No pain or stiffness  RESPIRATORY: No cough, wheezing, chills or hemoptysis; No shortness of breath  CARDIOVASCULAR: No chest pain, palpitations, dizziness, or leg swelling  GASTROINTESTINAL: No loss of appetite, decreased PO intake. No abdominal or epigastric pain. No nausea, vomiting; No diarrhea or constipation.   GENITOURINARY: No dysuria, frequency, hematuria, retention or incontinence  MUSCULOSKELETAL: No joint pain or swelling; No muscle, back, or extremity pain, no upper or lower motor strength weakness, no saddle anesthesia, bowel/bladder incontinence, no falls   NEURO: No headaches, No numbness/tingling b/l LE, No weakness  ENDOCRINE: No polyuria, polydipsia, heat or cold intolerance; No hair loss  PSYCHIATRIC: No depression, anxiety or difficulty sleeping    PHYSICAL EXAM:  GENERAL:  Alert & Oriented X4, cooperative, NAD, Good concentration. Speech is clear.   RESPIRATORY: Respirations even and unlabored. Clear to auscultation bilaterally; No rales, rhonchi, wheezing, or rubs  CARDIOVASCULAR: Normal S1/S2, regular rate and rhythm; No murmurs, rubs, or gallops. No JVD.   GASTROINTESTINAL:  Soft, Nontender, Nondistended; Bowel sounds present  PERIPHERAL VASCULAR:  Extremities warm without edema. 2+ Peripheral Pulses, No cyanosis, No calf tenderness  MUSCULOSKELETAL: Motor Strength 5/5 B/L upper and lower extremities; moves all extremities equally against gravity; ROM intact; negative SLR; No tenderness on palpation of all joints.   SKIN: Warm, dry, intact. No rashes, lesions, scars or wounds.     Risk factors associated with adverse outcomes related to opioid treatment  [ ]  Concurrent benzodiazepine use  [ ]  History/ Active substance use or alcohol use disorder  [ ] Psychiatric co-morbidity  [ ] Sleep apnea  [ ] COPD  [ ] BMI> 35  [ ] Liver dysfunction  [ ] Renal dysfunction  [ ] CHF  [ ] Smoker  [X]  Age > 60 years    [X]  [place]  Reviewed and Copied to Chart. See below.    Plan of care and goal oriented pain management treatment options were discussed with patient and /or primary care giver; all questions and concerns were addressed and care was aligned with patient's wishes.    Educated patient on goal oriented pain management treatment options      Source of information: KAELA DRAKE, Chart review  Patient language: English  : n/a    HPI:  89 yo F, lives alone HHA  8hrs/6 days, ambulates with a walker, with PMH of HTN, Spinal stenosis,  RT Breast CA s/p lumpectomy, radiation and chemo, present with right hip pain s/p unwitnessed mechanical fall. Patient reports that she walked into her bathroom without a walker when she accidentally slipped and fell on her right side. Patient denies any head trauma, LOC, headache, vision change, chest pain, numbness/tingling sensation. Patient reports since the fall she has been  unable to ambulates.    In the ED,   v/s: 177/80, 73, 97.4F, 96% RA  Labs: WBC 12, Hgb 14  EK BPM, NSR  CXR: negative; There is lower lumbar degeneration and left lumbar curve  Xray RT Hip:  There is an intertrochanteric fracture of the right hip with increased  angulation at the fracture site.  Xray RT Femur:  Lower femur are intact.  Xray RT knee : No effusion. Arterial calcifications. Diffuse moderate degeneration. No fracture  s/p Morphine 2mg  s/p bolus   (08 Dec 2024 18:02)      Patient is a 89y old  Female who presents with a chief complaint of Closed fracture of hip. (10 Dec 2024 09:02)    X-ray of the pelvis demonstrated an intertrochanteric fracture of the right hip with increased angulation at the fracture site.      Pt is admitted s/p mechanical fall with an intertrochanteric fracture of the right hip.  Pt is s/p 12/ insertion of locking intramedullary bola into right hip POD #1.  Pain service consulted for management of right hip pain. Pt seen and examined at bedside. Reports pain score ***  SCALE USED: (1-10 VNRS). Pt describes pain as .... radiating to... alleviated by pain medication... exacerbated by movement... Pt tolerating PO diet. Denies lethargy, chest pain, SOB, nausea, vomiting, constipation. Reports last BM ***. Patient stated goal for pain control: to be able to take deep breaths, get out of bed to chair and ambulate with tolerable pain control. Per IStop review pt takes Tramadol for pain at home.     PAST MEDICAL & SURGICAL HISTORY:  HTN (hypertension)    Breast cancer    Spinal stenosis    H/O breast surgery    FAMILY HISTORY:  No pertinent family history in first degree relatives    Social History:  Lives Alone, HHA  8hrs/ 6 days (08 Dec 2024 18:02)  [X] Denies ETOH use, illicit drug use and smoking    Allergies  penicillin (Unknown)  Novocain (Unknown)    MEDICATIONS  (STANDING):  acetaminophen     Tablet .. 975 milliGRAM(s) Oral every 6 hours  atorvastatin 20 milliGRAM(s) Oral at bedtime  chlorhexidine 2% Cloths 1 Application(s) Topical once  enoxaparin Injectable 30 milliGRAM(s) SubCutaneous every 12 hours  metoprolol succinate ER 25 milliGRAM(s) Oral daily  multivitamin 1 Tablet(s) Oral daily  polyethylene glycol 3350 17 Gram(s) Oral daily  povidone iodine 10% Nasal Swab 1 Application(s) Both Nostrils once  senna 2 Tablet(s) Oral at bedtime  sodium chloride 0.9%. 1000 milliLiter(s) (125 mL/Hr) IV Continuous <Continuous>  valsartan 160 milliGRAM(s) Oral daily    MEDICATIONS  (PRN):  acetaminophen     Tablet .. 650 milliGRAM(s) Oral every 6 hours PRN Temp greater or equal to 38C (100.4F), Mild Pain (1 - 3)  aluminum hydroxide/magnesium hydroxide/simethicone Suspension 30 milliLiter(s) Oral every 4 hours PRN Dyspepsia  magnesium hydroxide Suspension 30 milliLiter(s) Oral daily PRN Constipation  melatonin 3 milliGRAM(s) Oral at bedtime PRN Insomnia  morphine  - Injectable 2 milliGRAM(s) IV Push every 4 hours PRN Moderate Pain (4 - 6)  morphine  - Injectable 4 milliGRAM(s) IV Push every 6 hours PRN Severe Pain (7 - 10)  ondansetron Injectable 4 milliGRAM(s) IV Push every 8 hours PRN Nausea and/or Vomiting  ondansetron Injectable 4 milliGRAM(s) IV Push every 6 hours PRN Nausea and/or Vomiting  oxyCODONE    IR 2.5 milliGRAM(s) Oral every 4 hours PRN Moderate Pain (4 - 6)  oxyCODONE    IR 5 milliGRAM(s) Oral every 4 hours PRN Severe Pain (7 - 10)      Vital Signs Last 24 Hrs  T(C): 36.4 (10 Dec 2024 05:05), Max: 37 (09 Dec 2024 13:05)  T(F): 97.6 (10 Dec 2024 05:05), Max: 98.6 (09 Dec 2024 13:05)  HR: 85 (10 Dec 2024 10:17) (56 - 89)  BP: 114/62 (10 Dec 2024 10:17) (114/62 - 148/74)  BP(mean): 85 (09 Dec 2024 21:48) (65 - 92)  RR: 18 (10 Dec 2024 05:05) (11 - 22)  SpO2: 100% (10 Dec 2024 05:05) (95% - 100%)    Parameters below as of 09 Dec 2024 21:48  Patient On (Oxygen Delivery Method): nasal cannula  O2 Flow (L/min): 2    LABS: Reviewed.                        13.0   8.23  )-----------( 113      ( 10 Dec 2024 07:47 )             39.0     12-10    130[L]  |  97  |  17  ----------------------------<  157[H]  5.1   |  27  |  0.75    Ca    8.9      10 Dec 2024 07:47  Phos  3.4     12-09  Mg     2.2     12-09    TPro  5.8[L]  /  Alb  3.0[L]  /  TBili  0.7  /  DBili  x   /  AST  14  /  ALT  15  /  AlkPhos  64  12-09    PT/INR - ( 09 Dec 2024 06:52 )   PT: 13.0 sec;   INR: 1.12 ratio         PTT - ( 09 Dec 2024 06:52 )  PTT:30.7 sec  LIVER FUNCTIONS - ( 09 Dec 2024 06:52 )  Alb: 3.0 g/dL / Pro: 5.8 g/dL / ALK PHOS: 64 U/L / ALT: 15 U/L DA / AST: 14 U/L / GGT: x           Urinalysis Basic - ( 10 Dec 2024 07:47 )    Color: x / Appearance: x / SG: x / pH: x  Gluc: 157 mg/dL / Ketone: x  / Bili: x / Urobili: x   Blood: x / Protein: x / Nitrite: x   Leuk Esterase: x / RBC: x / WBC x   Sq Epi: x / Non Sq Epi: x / Bacteria: x    Radiology: Reviewed.   < from: Xray Femur 2 Views, Right (24 @ 16:16) >    ACC: [id number] EXAM:  XR KNEE AP LAT OBL 3 VIEWS RT   ORDERED BY:  WEI BRYSON     ACC: [id number] EXAM:  XR FEMUR 2 VIEWS RT   ORDERED BY:  WEI BRYSON     ACC: [id number] EXAM:  XR CHEST PORTABLE URGENT 1V   ORDERED BY:  WEI BRYSON     ACC: [id number] EXAM:  XR HIP WITH PELV 1V RT   ORDERED BY:  WEI BRYSON     *** ADDENDUM # 1 ***    ADDENDUM.    Right hip with pelvis, right femur, and right knee findings are as   follows.    Right hip with pelvis. 3 views.    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 There is mild   symmetric hip degeneration and arterial calcification.    There is an intertrochanteric fracture of the right hip with increased   angulation at the fracture site.    Right femur. 4 views. Lower femur are intact.    Right knee. 3 views. No effusion. Arterial calcifications. Diffuse   moderate degeneration. No fracture.    Intertrochanteric fracture right hip.    --- End of Report ---    *** END OF ADDENDUM # 1 ***      PROCEDURE DATE:  2024          INTERPRETATION:  AP semierect chest on 2024 at 3:43 PM.   Patient had trauma.    Elevated diaphragms crowds the chest.    Heart magnified by technique.    No definite acute finding or visible fracture.    Findings are similar to 2021.    IMPRESSION: No acute finding.    --- End of Report ---    ***Please see the addendum at the top of this report. It may contain   additional important information or changes.****        MARIA D JURADO MD; Attending Radiologist  This document has been electronically signed. Dec  8 2024  4:19PM  1st Addendum: MARIA D JURADO MD; Attending Radiologist  The first addendum was electronically signed on: Dec  8 2024  6:29PM.    < end of copied text >      ORT Score -   Family Hx of substance abuse	Female	      Male  Alcohol 	                                           1                     3  Illegal drugs	                                   2                     3  Rx drugs                                           4 	                  4  Personal Hx of substance abuse		  Alcohol 	                                          3	                  3  Illegal drugs                                     4	                  4  Rx drugs                                            5 	                  5  Age between 16- 45 years	           1                     1  hx preadolescent sexual abuse	   3 	                  0  Psychological disease		  ADD, OCD, bipolar, schizophrenia   2	          2  Depression                                           1 	          1  Total: 0    a score of 3 or lower indicates low risk for opioid abuse		  a score of 4-7 indicates moderate risk for opioid abuse		  a score of 8 or higher indicates high risk for opioid abuse    REVIEW OF SYSTEMS:  CONSTITUTIONAL: No fever or fatigue  HEENT:  No difficulty hearing, no change in vision  NECK: No pain or stiffness  RESPIRATORY: No cough, wheezing, chills or hemoptysis; No shortness of breath  CARDIOVASCULAR: No chest pain, palpitations, dizziness, or leg swelling  GASTROINTESTINAL: No loss of appetite, decreased PO intake. No abdominal or epigastric pain. No nausea, vomiting; No diarrhea or constipation.   GENITOURINARY: No dysuria, frequency, hematuria, retention or incontinence  MUSCULOSKELETAL: No joint pain or swelling; No muscle, back, or extremity pain, no upper or lower motor strength weakness, no saddle anesthesia, bowel/bladder incontinence, no falls   NEURO: No headaches, No numbness/tingling b/l LE, No weakness  ENDOCRINE: No polyuria, polydipsia, heat or cold intolerance; No hair loss  PSYCHIATRIC: No depression, anxiety or difficulty sleeping    PHYSICAL EXAM:  GENERAL:  Alert & Oriented X4, cooperative, NAD, Good concentration. Speech is clear.   RESPIRATORY: Respirations even and unlabored. Clear to auscultation bilaterally; No rales, rhonchi, wheezing, or rubs  CARDIOVASCULAR: Normal S1/S2, regular rate and rhythm; No murmurs, rubs, or gallops. No JVD.   GASTROINTESTINAL:  Soft, Nontender, Nondistended; Bowel sounds present  PERIPHERAL VASCULAR:  Extremities warm without edema. 2+ Peripheral Pulses, No cyanosis, No calf tenderness  MUSCULOSKELETAL: Motor Strength 5/5 B/L upper and lower extremities; moves all extremities equally against gravity; ROM intact; negative SLR; No tenderness on palpation of all joints.   SKIN: Warm, dry, intact. No rashes, lesions, scars or wounds.     Risk factors associated with adverse outcomes related to opioid treatment  [ ]  Concurrent benzodiazepine use  [ ]  History/ Active substance use or alcohol use disorder  [ ] Psychiatric co-morbidity  [ ] Sleep apnea  [ ] COPD  [ ] BMI> 35  [ ] Liver dysfunction  [ ] Renal dysfunction  [ ] CHF  [ ] Smoker  [X]  Age > 60 years    [X]  [place]  Reviewed and Copied to Chart. See below.    Plan of care and goal oriented pain management treatment options were discussed with patient and /or primary care giver; all questions and concerns were addressed and care was aligned with patient's wishes.    Educated patient on goal oriented pain management treatment options      Source of information: KAELA DRAKE, Chart review  Patient language: English  : n/a    HPI:  89 yo F, lives alone HHA  8hrs/6 days, ambulates with a walker, with PMH of HTN, Spinal stenosis,  RT Breast CA s/p lumpectomy, radiation and chemo, present with right hip pain s/p unwitnessed mechanical fall. Patient reports that she walked into her bathroom without a walker when she accidentally slipped and fell on her right side. Patient denies any head trauma, LOC, headache, vision change, chest pain, numbness/tingling sensation. Patient reports since the fall she has been  unable to ambulates.    In the ED,   v/s: 177/80, 73, 97.4F, 96% RA  Labs: WBC 12, Hgb 14  EK BPM, NSR  CXR: negative; There is lower lumbar degeneration and left lumbar curve  Xray RT Hip:  There is an intertrochanteric fracture of the right hip with increased  angulation at the fracture site.  Xray RT Femur:  Lower femur are intact.  Xray RT knee : No effusion. Arterial calcifications. Diffuse moderate degeneration. No fracture  s/p Morphine 2mg  s/p bolus   (08 Dec 2024 18:02)      Patient is a 89y old  Female who presents with a chief complaint of Closed fracture of hip. (10 Dec 2024 09:02)    X-ray of the pelvis demonstrated an intertrochanteric fracture of the right hip with increased angulation at the fracture site.      Pt is admitted s/p mechanical fall with an intertrochanteric fracture of the right hip.  Pt is s/p  insertion of locking intramedullary bola into right hip POD #1.  Pain service consulted for management of right hip pain. Pt seen and examined at bedside with family present. Reports pain score 0/10  SCALE USED: (1-10 VNRS). Pt is sitting up in chair with right leg elevated and heel off surface to avoid pressure and is supported by a rolled towel.  Pt describes right hip pain when present as aching and radiates to the right thigh. Pain is alleviated by pain medication and exacerbated by movement.. Pt tolerating PO diet. Denies lethargy, chest pain, SOB, nausea, vomiting, constipation. Reports last BM . Patient stated goal for pain control: to be able to take deep breaths, get out of bed to chair and ambulate with tolerable pain control. Per IStop review pt takes Tramadol for pain at home.     PAST MEDICAL & SURGICAL HISTORY:  HTN (hypertension)    Breast cancer    Spinal stenosis    H/O breast surgery    FAMILY HISTORY:  No pertinent family history in first degree relatives    Social History:  Lives Alone, HHA  8hrs/ 6 days (08 Dec 2024 18:02)  [X] Denies ETOH use, illicit drug use and smoking    Allergies  penicillin (Unknown)  Novocain (Unknown)    MEDICATIONS  (STANDING):  acetaminophen     Tablet .. 975 milliGRAM(s) Oral every 6 hours  atorvastatin 20 milliGRAM(s) Oral at bedtime  chlorhexidine 2% Cloths 1 Application(s) Topical once  enoxaparin Injectable 30 milliGRAM(s) SubCutaneous every 12 hours  metoprolol succinate ER 25 milliGRAM(s) Oral daily  multivitamin 1 Tablet(s) Oral daily  polyethylene glycol 3350 17 Gram(s) Oral daily  povidone iodine 10% Nasal Swab 1 Application(s) Both Nostrils once  senna 2 Tablet(s) Oral at bedtime  sodium chloride 0.9%. 1000 milliLiter(s) (125 mL/Hr) IV Continuous <Continuous>  valsartan 160 milliGRAM(s) Oral daily    MEDICATIONS  (PRN):  acetaminophen     Tablet .. 650 milliGRAM(s) Oral every 6 hours PRN Temp greater or equal to 38C (100.4F), Mild Pain (1 - 3)  aluminum hydroxide/magnesium hydroxide/simethicone Suspension 30 milliLiter(s) Oral every 4 hours PRN Dyspepsia  magnesium hydroxide Suspension 30 milliLiter(s) Oral daily PRN Constipation  melatonin 3 milliGRAM(s) Oral at bedtime PRN Insomnia  morphine  - Injectable 2 milliGRAM(s) IV Push every 4 hours PRN Moderate Pain (4 - 6)  morphine  - Injectable 4 milliGRAM(s) IV Push every 6 hours PRN Severe Pain (7 - 10)  ondansetron Injectable 4 milliGRAM(s) IV Push every 8 hours PRN Nausea and/or Vomiting  ondansetron Injectable 4 milliGRAM(s) IV Push every 6 hours PRN Nausea and/or Vomiting  oxyCODONE    IR 2.5 milliGRAM(s) Oral every 4 hours PRN Moderate Pain (4 - 6)  oxyCODONE    IR 5 milliGRAM(s) Oral every 4 hours PRN Severe Pain (7 - 10)      Vital Signs Last 24 Hrs  T(C): 36.4 (10 Dec 2024 05:05), Max: 37 (09 Dec 2024 13:05)  T(F): 97.6 (10 Dec 2024 05:05), Max: 98.6 (09 Dec 2024 13:05)  HR: 85 (10 Dec 2024 10:17) (56 - 89)  BP: 114/62 (10 Dec 2024 10:17) (114/62 - 148/74)  BP(mean): 85 (09 Dec 2024 21:48) (65 - 92)  RR: 18 (10 Dec 2024 05:05) (11 - 22)  SpO2: 100% (10 Dec 2024 05:05) (95% - 100%)    Parameters below as of 09 Dec 2024 21:48  Patient On (Oxygen Delivery Method): nasal cannula  O2 Flow (L/min): 2    LABS: Reviewed.                        13.0   8.23  )-----------( 113      ( 10 Dec 2024 07:47 )             39.0     12-10    130[L]  |  97  |  17  ----------------------------<  157[H]  5.1   |  27  |  0.75    Ca    8.9      10 Dec 2024 07:47  Phos  3.4     12-09  Mg     2.2     12-09    TPro  5.8[L]  /  Alb  3.0[L]  /  TBili  0.7  /  DBili  x   /  AST  14  /  ALT  15  /  AlkPhos  64  12-09    PT/INR - ( 09 Dec 2024 06:52 )   PT: 13.0 sec;   INR: 1.12 ratio         PTT - ( 09 Dec 2024 06:52 )  PTT:30.7 sec  LIVER FUNCTIONS - ( 09 Dec 2024 06:52 )  Alb: 3.0 g/dL / Pro: 5.8 g/dL / ALK PHOS: 64 U/L / ALT: 15 U/L DA / AST: 14 U/L / GGT: x           Urinalysis Basic - ( 10 Dec 2024 07:47 )    Color: x / Appearance: x / SG: x / pH: x  Gluc: 157 mg/dL / Ketone: x  / Bili: x / Urobili: x   Blood: x / Protein: x / Nitrite: x   Leuk Esterase: x / RBC: x / WBC x   Sq Epi: x / Non Sq Epi: x / Bacteria: x    Radiology: Reviewed.   < from: Xray Femur 2 Views, Right (24 @ 16:16) >    ACC: [id number] EXAM:  XR KNEE AP LAT OBL 3 VIEWS RT   ORDERED BY:  WEI BRYSON     ACC: [id number] EXAM:  XR FEMUR 2 VIEWS RT   ORDERED BY:  WEI BRYSON     ACC: [id number] EXAM:  XR CHEST PORTABLE URGENT 1V   ORDERED BY:  WEI BRYSON     ACC: [id number] EXAM:  XR HIP WITH PELV 1V RT   ORDERED BY:  WEI BRYSON     *** ADDENDUM # 1 ***    ADDENDUM.    Right hip with pelvis, right femur, and right knee findings are as   follows.    Right hip with pelvis. 3 views.    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 There is mild   symmetric hip degeneration and arterial calcification.    There is an intertrochanteric fracture of the right hip with increased   angulation at the fracture site.    Right femur. 4 views. Lower femur are intact.    Right knee. 3 views. No effusion. Arterial calcifications. Diffuse   moderate degeneration. No fracture.    Intertrochanteric fracture right hip.    --- End of Report ---    *** END OF ADDENDUM # 1 ***      PROCEDURE DATE:  2024          INTERPRETATION:  AP semierect chest on 2024 at 3:43 PM.   Patient had trauma.    Elevated diaphragms crowds the chest.    Heart magnified by technique.    No definite acute finding or visible fracture.    Findings are similar to 2021.    IMPRESSION: No acute finding.    --- End of Report ---    ***Please see the addendum at the top of this report. It may contain   additional important information or changes.****    MARIA D JURADO MD; Attending Radiologist  This document has been electronically signed. Dec  8 2024  4:19PM  1st Addendum: MARIA D JURADO MD; Attending Radiologist  The first addendum was electronically signed on: Dec  8 2024  6:29PM.    < end of copied text >    ORT Score -   Family Hx of substance abuse	Female	      Male  Alcohol 	                                           1                     3  Illegal drugs	                                   2                     3  Rx drugs                                           4 	                  4  Personal Hx of substance abuse		  Alcohol 	                                          3	                  3  Illegal drugs                                     4	                  4  Rx drugs                                            5 	                  5  Age between 16- 45 years	           1                     1  hx preadolescent sexual abuse	   3 	                  0  Psychological disease		  ADD, OCD, bipolar, schizophrenia   2	          2  Depression                                           1 	          1  Total: 0    a score of 3 or lower indicates low risk for opioid abuse		  a score of 4-7 indicates moderate risk for opioid abuse		  a score of 8 or higher indicates high risk for opioid abuse    REVIEW OF SYSTEMS:  CONSTITUTIONAL: No fever or fatigue  HEENT:  No difficulty hearing, no change in vision  NECK: No pain or stiffness  RESPIRATORY: No cough, wheezing, chills or hemoptysis; No shortness of breath  CARDIOVASCULAR: No chest pain, palpitations, dizziness, or leg swelling  GASTROINTESTINAL: No loss of appetite, decreased PO intake. No abdominal or epigastric pain. No nausea, vomiting; No diarrhea or constipation.   GENITOURINARY: No dysuria, frequency, hematuria, retention or incontinence  MUSCULOSKELETAL: + right hip and heel pain, + right hip/thigh swelling POD #1, no upper or left lower motor strength weakness, no saddle anesthesia, bowel/bladder incontinence, + fall, + occasional urinary retention based on her schedule  NEURO: No headaches, No numbness/tingling b/l LE, No weakness  ENDOCRINE: No polyuria, polydipsia, heat or cold intolerance; No hair loss  PSYCHIATRIC: No depression, anxiety or difficulty sleeping    PHYSICAL EXAM:  GENERAL:  Alert & Oriented X 4, cooperative, NAD, Good concentration. Speech is clear.   RESPIRATORY: Respirations even and unlabored. Clear to auscultation bilaterally; No rales, rhonchi, wheezing, or rubs  CARDIOVASCULAR: Normal S1/S2, regular rate and rhythm; No murmurs, rubs, or gallops. No JVD.   GASTROINTESTINAL:  Soft, Nontender, Nondistended; Bowel sounds present  PERIPHERAL VASCULAR:  Extremities warm with mild to moderate right hip swelling POD #1. 2+ Peripheral Pulses, No cyanosis, No calf tenderness  MUSCULOSKELETAL: Motor Strength 5/5 B/L upper and left lower extremities; moves all extremities equally against gravity except RLE s/p R hip surgery on 12/ POD #1, + tenderness on palpation of right hip  SKIN: Warm, dry, intact. Right hip dressing is clean dry and intact.     Risk factors associated with adverse outcomes related to opioid treatment  [ ]  Concurrent benzodiazepine use  [ ]  History/ Active substance use or alcohol use disorder  [ ] Psychiatric co-morbidity  [ ] Sleep apnea  [ ] COPD  [ ] BMI> 35  [ ] Liver dysfunction  [ ] Renal dysfunction  [ ] CHF  [ ] Smoker  [X]  Age > 60 years    [X]  [place]  Reviewed and Copied to Chart. See below.    Plan of care and goal oriented pain management treatment options were discussed with patient and /or primary care giver; all questions and concerns were addressed and care was aligned with patient's wishes.    Educated patient on goal oriented pain management treatment options

## 2024-12-10 NOTE — PROGRESS NOTE ADULT - PROBLEM SELECTOR PLAN 1
Present with right hip pain s/p unwitnessed mechanical fall.  RT HIP: Tenderness to palpation. lower extremity shortened in comparison to left hip.  CXR: negative; There is lower lumbar degeneration and left lumbar curve  Xray RT Hip:  There is an intertrochanteric fracture of the right hip with increased  angulation at the fracture site.  Xray RT Femur:  Lower femur are intact.  Xray RT knee : No effusion. Arterial calcifications. Diffuse moderate degeneration. No fracture  s/p Morphine 2mg  - Orthopedic consulted by the ED  > OR 12/9/24  - Pain med: Tylenol, Morphine 2mg, Morphine 4prn (mild, moderate, severe pain)  - Fall precaution Present with right hip pain s/p unwitnessed mechanical fall.  RT HIP: Tenderness to palpation. lower extremity shortened in comparison to left hip.  CXR: negative; There is lower lumbar degeneration and left lumbar curve  Xray RT Hip:  There is an intertrochanteric fracture of the right hip with increased  angulation at the fracture site.  Xray RT Femur:  Lower femur are intact.  Xray RT knee : No effusion. Arterial calcifications. Diffuse moderate degeneration. No fracture  s/p Morphine 2mg  Ortho following  Now s/p right hip IM nailing 12/9  Pain mgnt recc appreciated  PT recc PAVITHRA

## 2024-12-10 NOTE — PROGRESS NOTE ADULT - PROBLEM SELECTOR PLAN 3
Hx of Right Breast CA s/p Lumpectomy, radiation and chemo 24 years ago  Currently in remission Hx of Spinal stenosis  CXR: negative; There is lower lumbar degeneration and left lumbar curve

## 2024-12-10 NOTE — PROGRESS NOTE ADULT - ASSESSMENT
89 yo F, lives alone [facility]  8hrs/6 days, ambulates with a walker, with PMH of HTN, Spinal stenosis,  RT Breast CA s/p lumpectomy, radiation and chemo, present with right hip pain s/p unwitnessed mechanical fall. Admitted for intertrochanteric fracture of right hip, followed by ortho, now s/p right hip IM nailing POD#1.

## 2024-12-10 NOTE — CHART NOTE - NSCHARTNOTEFT_GEN_A_CORE
Patient seen and examined at bedside. Patient is s/p right hip IMN, tolerated the procedure well. No acute complaints. Pain well controlled. Denies any numbness, tingling, paresthesias. Hemodynamically stable and afebrile.     Vital Signs Last 24 Hrs  T(C): 36.4 (10 Dec 2024 00:03), Max: 37 (09 Dec 2024 05:04)  T(F): 97.6 (10 Dec 2024 00:03), Max: 98.6 (09 Dec 2024 05:04)  HR: 63 (10 Dec 2024 00:03) (56 - 89)  BP: 122/72 (10 Dec 2024 00:03) (122/66 - 159/57)  BP(mean): 85 (09 Dec 2024 21:48) (65 - 92)  RR: 18 (10 Dec 2024 00:03) (11 - 22)  SpO2: 99% (10 Dec 2024 00:03) (95% - 100%)    Parameters below as of 09 Dec 2024 21:48  Patient On (Oxygen Delivery Method): nasal cannula  O2 Flow (L/min): 2      PE  General: well appearing, no acute distress  Resp: Normal respiratory effort  CV: regular rate and rhythm   Abd: soft, nontender, nondistended   : voiding spontaneously   MSK: RLE with dressing in place, clean, dry, and intact. Nontender to palpation. Compartments soft. DP and PT pulses intact bilaterally. No edema, erythema, or skin changes appreciated bilaterally.   Neuro: GCS15, sensation intact bilaterally                          12.9   11.32 )-----------( 130      ( 09 Dec 2024 20:40 )             37.2   12-09    129[L]  |  97  |  11  ----------------------------<  120[H]  4.3   |  27  |  0.64    Ca    8.6      09 Dec 2024 20:40  Phos  3.4     12-09  Mg     2.2     12-09    TPro  5.8[L]  /  Alb  3.0[L]  /  TBili  0.7  /  DBili  x   /  AST  14  /  ALT  15  /  AlkPhos  64  12-09    89F s/p R hip IMN, recovering well.     Plan   -Multimodal pain control   -Regular diet   -AM labs   -PT eval   -Incentive spirometer

## 2024-12-10 NOTE — CONSULT NOTE ADULT - ASSESSMENT
Search Terms: KAELA SEBASTIAN, 08/30/1932Search Date: 12/10/2024 10:51:30 AM  The Drug Utilization Report below displays all of the controlled substance prescriptions, if any, that your patient has filled in the last twelve months. The information displayed on this report is compiled from pharmacy submissions to the Department, and accurately reflects the information as submitted by the pharmacies.    This report was requested by: Mariann Gooden | Reference #: [id number]    You have not added a JORGE number. Keeping your JORGE number(s) up to date on the My JORGE # tab will enable the separation of your prescriptions from others in the search results.    Practitioner Count: 1  Pharmacy Count: 1  Current Opioid Prescriptions: 0  Current Benzodiazepine Prescriptions: 0  Current Stimulant Prescriptions: 0      Patient Demographic Information (PDI)       PDI	First Name	Last Name	Birth Date	Gender	Street Address	[facility] Code  A	Kaela Sebastian	08/30/1932	Female	[id number]	[facility]	[id number]    Prescription Information      PDI Filter:    PDI	Current Rx	Drug Type	Rx Written	Rx Dispensed	Drug	Quantity	Days Supply	Prescriber Name	Prescriber JORGE #	Payment Method	Dispenser  A	N	O	10/09/2024	10/09/2024	tramadol hcl 50 mg tablet	30	30	Demetri Cordova MD	[id number]	Insurance	[facility]  A	N	O	08/29/2023	12/12/2023	tramadol hcl 50 mg tablet	23	23	Demetri Cordova MD	[id number]	Insurance	[facility]    * - Details of Drug Type : O = Opioid, B = Benzodiazepine, S = Stimulant    * - Drugs marked with an asterisk are compound drugs. If the compound drug is made up of more than one controlled substance, then each controlled substance will be a separate row in the table.

## 2024-12-10 NOTE — PROGRESS NOTE ADULT - PROBLEM SELECTOR PLAN 2
EK BPM, NSR  CXR: clear for acute pathology   COFFEY:  0.1 risk of postoperative pneumonia  RCRI: 0  METS: > 4  Low to moderate risk for moderate procedure. Hx of Right Breast CA s/p Lumpectomy, radiation and chemo 24 years ago  Currently in remission

## 2024-12-10 NOTE — PROGRESS NOTE ADULT - PROBLEM SELECTOR PLAN 4
Hx of Spinal stenosis  CXR: negative; There is lower lumbar degeneration and left lumbar curve Hx of HTN on Diovan 160mg, Metoprolol suc 25mg QD as per sure script  - c/w home med  with parameters.  - DASH diet  - MED REC completed

## 2024-12-10 NOTE — CONSULT NOTE ADULT - PROBLEM SELECTOR RECOMMENDATION 9
Pt presented after  a mechanical fall with acute right hip pain which is somatic and neuropathic in nature due to right hip fracture.  Pt is s/p 12/9 insertion of locking intramedullary bola into right hip POD #1. High risk medications reviewed. Avoid polypharmacy. Avoid IV opioids. Avoid NSAIDs and benzodiazepines. Non-pharmacological sleep aides initiated. Non-opioid medications and non-pharmacological pain management measures initiated.   Opioid pain recommendations  Discharge planning: home Vs. Rehab pending PT.  - Oxycodone 2.5/5 mg PO q 4 hours PRN severe pain. Monitor for sedation/ respiratory depression.   Non-opioid pain recommendations   - Acetaminophen 1 gram PO q 8 hours for 3 days. Monitor LFTs  - Lidoderm 4% patch daily.   Bowel Regimen  - Miralax 17G PO daily  - Senna 2 tablets at bedtime for constipation  - Dulcolax 10 mg WI daily PRN  Mild pain 1 - 3  - Non-pharmacological pain treatment recommendations  - Warm/ Cool packs PRN   - Repositioning, guided imagery, relaxation, distraction.  - Physical therapy OOB if no contraindications   Recommendations discussed with primary team and RN

## 2024-12-10 NOTE — PHYSICAL THERAPY INITIAL EVALUATION ADULT - GENERAL OBSERVATIONS, REHAB EVAL
elderly female, in care of daughter bedside elderly female, in care of daughter bedside, s/p hip im nailing, patient requires 2 PT assist with rw gait, RLE WBAT, transfers and oob chair assessment to unstable balance

## 2024-12-10 NOTE — CHART NOTE - NSCHARTNOTEFT_GEN_A_CORE
EVENT: Notified by nurse regarding patient with distended bladder, bladder scan >600 cc     HPI: 89 yo F, lives alone [facility]HA  8hrs/6 days, ambulates with a walker, with PMH of HTN, Spinal stenosis,  RT Breast CA s/p lumpectomy, radiation and chemo, presented with right hip pain s/p unwitnessed mechanical fall. Admitted for intertrochanteric fracture of right hip   12/9 S/P right hip IM nailing.     Patient Status post intramedullary nailing done 12/9/24. Patient has not voided post surgery, vital signs stable; patient on IV fluids, bladder scan >600cc of urine,      T(C): 36.4 (12-10-24 @ 05:05), Max: 37 (12-09-24 @ 13:05)  HR: 70 (12-10-24 @ 05:05) (56 - 89)  BP: 145/68 (12-10-24 @ 05:05) (122/66 - 148/74)  RR: 18 (12-10-24 @ 05:05) (11 - 22)  SpO2: 100% (12-10-24 @ 05:05) (95% - 100%)      PROBLEM: Urinary retention-  Post-operative following IM nailing   PLAN:   Straight cath ordered   Continue bladder scan q6H and straight cath if needed. On third catheterization leave indwelling vidal.   Monitor I&O  Encourage oral hydration  Above plan/event will be endorsed to the primary team in am.

## 2024-12-10 NOTE — PHYSICAL THERAPY INITIAL EVALUATION ADULT - PHYSICAL ASSIST/NONPHYSICAL ASSIST, REHAB EVAL
The right chest and right neck was prepped. The site was prepped with ChloraPrep. The site was clipped. The patient was draped. The patient was positioned supine.  1 person assist

## 2024-12-10 NOTE — PROGRESS NOTE ADULT - PROBLEM SELECTOR PLAN 5
Hx of HTN on Diovan 160mg, Metoprolol suc 25mg QD as per sure script  - c/w home med  with parameters.  - DASH diet  - MED REC DVT ppx:  Heparin today and hold tomorrow for surgery

## 2024-12-11 ENCOUNTER — TRANSCRIPTION ENCOUNTER (OUTPATIENT)
Age: 88
End: 2024-12-11

## 2024-12-11 DIAGNOSIS — R71.0 PRECIPITOUS DROP IN HEMATOCRIT: ICD-10-CM

## 2024-12-11 DIAGNOSIS — R33.9 RETENTION OF URINE, UNSPECIFIED: ICD-10-CM

## 2024-12-11 DIAGNOSIS — E87.1 HYPO-OSMOLALITY AND HYPONATREMIA: ICD-10-CM

## 2024-12-11 LAB
ANION GAP SERPL CALC-SCNC: 5 MMOL/L — SIGNIFICANT CHANGE UP (ref 5–17)
ANION GAP SERPL CALC-SCNC: 7 MMOL/L — SIGNIFICANT CHANGE UP (ref 5–17)
BASOPHILS # BLD AUTO: 0.03 K/UL — SIGNIFICANT CHANGE UP (ref 0–0.2)
BASOPHILS NFR BLD AUTO: 0.4 % — SIGNIFICANT CHANGE UP (ref 0–2)
BUN SERPL-MCNC: 21 MG/DL — HIGH (ref 7–18)
BUN SERPL-MCNC: 22 MG/DL — HIGH (ref 7–18)
CALCIUM SERPL-MCNC: 7.8 MG/DL — LOW (ref 8.4–10.5)
CALCIUM SERPL-MCNC: 7.8 MG/DL — LOW (ref 8.4–10.5)
CHLORIDE SERPL-SCNC: 92 MMOL/L — LOW (ref 96–108)
CHLORIDE SERPL-SCNC: 93 MMOL/L — LOW (ref 96–108)
CO2 SERPL-SCNC: 24 MMOL/L — SIGNIFICANT CHANGE UP (ref 22–31)
CO2 SERPL-SCNC: 24 MMOL/L — SIGNIFICANT CHANGE UP (ref 22–31)
CREAT SERPL-MCNC: 0.61 MG/DL — SIGNIFICANT CHANGE UP (ref 0.5–1.3)
CREAT SERPL-MCNC: 0.65 MG/DL — SIGNIFICANT CHANGE UP (ref 0.5–1.3)
EGFR: 83 ML/MIN/1.73M2 — SIGNIFICANT CHANGE UP
EGFR: 84 ML/MIN/1.73M2 — SIGNIFICANT CHANGE UP
EOSINOPHIL # BLD AUTO: 0.1 K/UL — SIGNIFICANT CHANGE UP (ref 0–0.5)
EOSINOPHIL NFR BLD AUTO: 1.2 % — SIGNIFICANT CHANGE UP (ref 0–6)
GLUCOSE SERPL-MCNC: 151 MG/DL — HIGH (ref 70–99)
GLUCOSE SERPL-MCNC: 201 MG/DL — HIGH (ref 70–99)
HCT VFR BLD CALC: 25.9 % — LOW (ref 34.5–45)
HCT VFR BLD CALC: 26.5 % — LOW (ref 34.5–45)
HGB BLD-MCNC: 9.1 G/DL — LOW (ref 11.5–15.5)
HGB BLD-MCNC: 9.2 G/DL — LOW (ref 11.5–15.5)
IMM GRANULOCYTES NFR BLD AUTO: 0.5 % — SIGNIFICANT CHANGE UP (ref 0–0.9)
LYMPHOCYTES # BLD AUTO: 1.78 K/UL — SIGNIFICANT CHANGE UP (ref 1–3.3)
LYMPHOCYTES # BLD AUTO: 22.1 % — SIGNIFICANT CHANGE UP (ref 13–44)
MCHC RBC-ENTMCNC: 29.4 PG — SIGNIFICANT CHANGE UP (ref 27–34)
MCHC RBC-ENTMCNC: 29.4 PG — SIGNIFICANT CHANGE UP (ref 27–34)
MCHC RBC-ENTMCNC: 34.7 G/DL — SIGNIFICANT CHANGE UP (ref 32–36)
MCHC RBC-ENTMCNC: 35.1 G/DL — SIGNIFICANT CHANGE UP (ref 32–36)
MCV RBC AUTO: 83.8 FL — SIGNIFICANT CHANGE UP (ref 80–100)
MCV RBC AUTO: 84.7 FL — SIGNIFICANT CHANGE UP (ref 80–100)
MONOCYTES # BLD AUTO: 1 K/UL — HIGH (ref 0–0.9)
MONOCYTES NFR BLD AUTO: 12.4 % — SIGNIFICANT CHANGE UP (ref 2–14)
NEUTROPHILS # BLD AUTO: 5.1 K/UL — SIGNIFICANT CHANGE UP (ref 1.8–7.4)
NEUTROPHILS NFR BLD AUTO: 63.4 % — SIGNIFICANT CHANGE UP (ref 43–77)
NRBC # BLD: 0 /100 WBCS — SIGNIFICANT CHANGE UP (ref 0–0)
NRBC # BLD: 0 /100 WBCS — SIGNIFICANT CHANGE UP (ref 0–0)
PLATELET # BLD AUTO: 105 K/UL — LOW (ref 150–400)
PLATELET # BLD AUTO: 98 K/UL — LOW (ref 150–400)
POTASSIUM SERPL-MCNC: 4.5 MMOL/L — SIGNIFICANT CHANGE UP (ref 3.5–5.3)
POTASSIUM SERPL-MCNC: 5 MMOL/L — SIGNIFICANT CHANGE UP (ref 3.5–5.3)
POTASSIUM SERPL-SCNC: 4.5 MMOL/L — SIGNIFICANT CHANGE UP (ref 3.5–5.3)
POTASSIUM SERPL-SCNC: 5 MMOL/L — SIGNIFICANT CHANGE UP (ref 3.5–5.3)
RBC # BLD: 3.09 M/UL — LOW (ref 3.8–5.2)
RBC # BLD: 3.13 M/UL — LOW (ref 3.8–5.2)
RBC # FLD: 13.4 % — SIGNIFICANT CHANGE UP (ref 10.3–14.5)
RBC # FLD: 13.4 % — SIGNIFICANT CHANGE UP (ref 10.3–14.5)
SODIUM SERPL-SCNC: 122 MMOL/L — LOW (ref 135–145)
SODIUM SERPL-SCNC: 123 MMOL/L — LOW (ref 135–145)
SODIUM UR-SCNC: <5 MMOL/L — SIGNIFICANT CHANGE UP
WBC # BLD: 7.21 K/UL — SIGNIFICANT CHANGE UP (ref 3.8–10.5)
WBC # BLD: 8.05 K/UL — SIGNIFICANT CHANGE UP (ref 3.8–10.5)
WBC # FLD AUTO: 7.21 K/UL — SIGNIFICANT CHANGE UP (ref 3.8–10.5)
WBC # FLD AUTO: 8.05 K/UL — SIGNIFICANT CHANGE UP (ref 3.8–10.5)

## 2024-12-11 PROCEDURE — 99233 SBSQ HOSP IP/OBS HIGH 50: CPT

## 2024-12-11 PROCEDURE — 99232 SBSQ HOSP IP/OBS MODERATE 35: CPT

## 2024-12-11 RX ORDER — ACETAMINOPHEN, DIPHENHYDRAMINE HCL, PHENYLEPHRINE HCL 325; 25; 5 MG/1; MG/1; MG/1
1 TABLET ORAL
Qty: 0 | Refills: 0 | DISCHARGE
Start: 2024-12-11

## 2024-12-11 RX ORDER — TAMSULOSIN HYDROCHLORIDE 0.4 MG/1
0.4 CAPSULE ORAL AT BEDTIME
Refills: 0 | Status: DISCONTINUED | OUTPATIENT
Start: 2024-12-11 | End: 2024-12-16

## 2024-12-11 RX ORDER — OXYCODONE HYDROCHLORIDE 30 MG/1
1 TABLET ORAL
Qty: 0 | Refills: 0 | DISCHARGE
Start: 2024-12-11

## 2024-12-11 RX ORDER — SODIUM CHLORIDE 9 MG/ML
2 INJECTION, SOLUTION INTRAMUSCULAR; INTRAVENOUS; SUBCUTANEOUS ONCE
Refills: 0 | Status: COMPLETED | OUTPATIENT
Start: 2024-12-11 | End: 2024-12-11

## 2024-12-11 RX ORDER — VALSARTAN 320 MG/1
160 TABLET ORAL AT BEDTIME
Refills: 0 | Status: DISCONTINUED | OUTPATIENT
Start: 2024-12-12 | End: 2024-12-13

## 2024-12-11 RX ORDER — TAMSULOSIN HYDROCHLORIDE 0.4 MG/1
1 CAPSULE ORAL
Qty: 0 | Refills: 0 | DISCHARGE
Start: 2024-12-11

## 2024-12-11 RX ORDER — LIDOCAINE 40 MG/G
1 CREAM TOPICAL
Qty: 0 | Refills: 0 | DISCHARGE
Start: 2024-12-11

## 2024-12-11 RX ORDER — SENNOSIDES 8.6 MG
2 TABLET ORAL
Qty: 0 | Refills: 0 | DISCHARGE
Start: 2024-12-11

## 2024-12-11 RX ORDER — SODIUM CHLORIDE 3 G/100ML
500 INJECTION, SOLUTION INTRAVENOUS
Refills: 0 | Status: DISCONTINUED | OUTPATIENT
Start: 2024-12-11 | End: 2024-12-12

## 2024-12-11 RX ORDER — ACETAMINOPHEN 500MG 500 MG/1
2 TABLET, COATED ORAL
Qty: 0 | Refills: 0 | DISCHARGE
Start: 2024-12-11

## 2024-12-11 RX ORDER — SODIUM CHLORIDE 9 MG/ML
250 INJECTION, SOLUTION INTRAMUSCULAR; INTRAVENOUS; SUBCUTANEOUS ONCE
Refills: 0 | Status: COMPLETED | OUTPATIENT
Start: 2024-12-11 | End: 2024-12-11

## 2024-12-11 RX ORDER — POLYETHYLENE GLYCOL 3350 17 G/17G
17 POWDER, FOR SOLUTION ORAL
Qty: 0 | Refills: 0 | DISCHARGE
Start: 2024-12-11

## 2024-12-11 RX ADMIN — LIDOCAINE 1 PATCH: 40 CREAM TOPICAL at 19:23

## 2024-12-11 RX ADMIN — ACETAMINOPHEN 500MG 1000 MILLIGRAM(S): 500 TABLET, COATED ORAL at 22:00

## 2024-12-11 RX ADMIN — SODIUM CHLORIDE 250 MILLILITER(S): 9 INJECTION, SOLUTION INTRAMUSCULAR; INTRAVENOUS; SUBCUTANEOUS at 10:30

## 2024-12-11 RX ADMIN — TAMSULOSIN HYDROCHLORIDE 0.4 MILLIGRAM(S): 0.4 CAPSULE ORAL at 21:12

## 2024-12-11 RX ADMIN — OXYCODONE HYDROCHLORIDE 5 MILLIGRAM(S): 30 TABLET ORAL at 05:56

## 2024-12-11 RX ADMIN — ACETAMINOPHEN 500MG 1000 MILLIGRAM(S): 500 TABLET, COATED ORAL at 14:25

## 2024-12-11 RX ADMIN — Medication 1 TABLET(S): at 11:06

## 2024-12-11 RX ADMIN — POLYETHYLENE GLYCOL 3350 17 GRAM(S): 17 POWDER, FOR SOLUTION ORAL at 11:06

## 2024-12-11 RX ADMIN — OXYCODONE HYDROCHLORIDE 5 MILLIGRAM(S): 30 TABLET ORAL at 06:37

## 2024-12-11 RX ADMIN — ENOXAPARIN SODIUM 30 MILLIGRAM(S): 30 INJECTION SUBCUTANEOUS at 11:04

## 2024-12-11 RX ADMIN — Medication 20 MILLIGRAM(S): at 21:12

## 2024-12-11 RX ADMIN — SODIUM CHLORIDE 2 GRAM(S): 9 INJECTION, SOLUTION INTRAMUSCULAR; INTRAVENOUS; SUBCUTANEOUS at 11:04

## 2024-12-11 RX ADMIN — ACETAMINOPHEN 500MG 1000 MILLIGRAM(S): 500 TABLET, COATED ORAL at 15:39

## 2024-12-11 RX ADMIN — LIDOCAINE 1 PATCH: 40 CREAM TOPICAL at 11:05

## 2024-12-11 RX ADMIN — ACETAMINOPHEN 500MG 1000 MILLIGRAM(S): 500 TABLET, COATED ORAL at 21:11

## 2024-12-11 RX ADMIN — LIDOCAINE 1 PATCH: 40 CREAM TOPICAL at 23:20

## 2024-12-11 RX ADMIN — ONDANSETRON HYDROCHLORIDE 4 MILLIGRAM(S): 4 TABLET, FILM COATED ORAL at 16:45

## 2024-12-11 RX ADMIN — Medication 2 TABLET(S): at 21:12

## 2024-12-11 RX ADMIN — SODIUM CHLORIDE 50 MILLILITER(S): 3 INJECTION, SOLUTION INTRAVENOUS at 21:11

## 2024-12-11 RX ADMIN — ENOXAPARIN SODIUM 30 MILLIGRAM(S): 30 INJECTION SUBCUTANEOUS at 21:11

## 2024-12-11 RX ADMIN — OXYCODONE HYDROCHLORIDE 2.5 MILLIGRAM(S): 30 TABLET ORAL at 00:15

## 2024-12-11 NOTE — PROGRESS NOTE ADULT - PROBLEM SELECTOR PLAN 3
Hx of Spinal stenosis  CXR: negative; There is lower lumbar degeneration and left lumbar curve likely acute on chronic as pt's OP serum Na noted 130  Nephro Dr. Sandoval consulted for worsening hyponatremia 129-->130-->123  Sodium chloride salt tab 2gm x 1 per nephro  f/u serum/urine osmolality  Urine sodium low at <5  f/u pm bmp

## 2024-12-11 NOTE — DISCHARGE NOTE PROVIDER - NSDCCPCAREPLAN_GEN_ALL_CORE_FT
PRINCIPAL DISCHARGE DIAGNOSIS  Diagnosis: Closed fracture of hip  Assessment and Plan of Treatment: Pain Management  DVT Prophylaxis with Venodynes & Lovenox  Sutures are absorbable. keep dressing clean and dry.   blister care  D/c aspirin on 1/19/24  Daily PT- WBAT with walker RLE  Follow-Up with Orthopedic Surgeon after Rehab   Follow up with Dr. Ion Landers, orthopaedic surgeon after discharge at [phone].     PRINCIPAL DISCHARGE DIAGNOSIS  Diagnosis: Closed fracture of hip  Assessment and Plan of Treatment: Pain Management  DVT Prophylaxis with Venodynes & Lovenox  Sutures are absorbable. keep dressing clean and dry.   blister care  D/c aspirin on 1/19/24  Daily PT- WBAT with walker RLE  Follow-Up with Orthopedic Surgeon after Rehab   Follow up with Dr. Ion Landers, orthopaedic surgeon after discharge at [phone].      SECONDARY DISCHARGE DIAGNOSES  Diagnosis: HTN (hypertension)  Assessment and Plan of Treatment: You have a history of Hypertension.   Hypertension is high blood pressure. Your blood pressure is the force of your blood moving against the walls of your arteries. Hypertension causes your blood pressure to get so high that your heart has to work much harder than normal. This can damage your heart. The cause of hypertension may not be known. This is called essential or primary hypertension. Hypertension caused by another medical condition, such as kidney disease, is called secondary hypertension.  You should continue to take you anti hypertensives as prescribed.   You should return to the Emergency deparrtment if you experience any severe headaches, vision loss or weakness in the arms or legs.  You should follow up with your primary doctor to have you blood pressur checked routinely.  You should follow a low salt diet.       PRINCIPAL DISCHARGE DIAGNOSIS  Diagnosis: Closed fracture of hip  Assessment and Plan of Treatment: Pain Management  DVT Prophylaxis with Venodynes & Lovenox  Sutures are absorbable. keep dressing clean and dry.   blister care  D/c aspirin on 1/19/24  Daily PT- WBAT with walker RLE  Follow-Up with Orthopedic Surgeon after Rehab   Follow up with Dr. Ion Landers, orthopaedic surgeon after discharge at [phone].      SECONDARY DISCHARGE DIAGNOSES  Diagnosis: HTN (hypertension)  Assessment and Plan of Treatment: You have a history of Hypertension.   Hypertension is high blood pressure. Your blood pressure is the force of your blood moving against the walls of your arteries. Hypertension causes your blood pressure to get so high that your heart has to work much harder than normal. This can damage your heart. The cause of hypertension may not be known. This is called essential or primary hypertension. Hypertension caused by another medical condition, such as kidney disease, is called secondary hypertension.  You should continue to take you anti hypertensives as prescribed.   You should return to the Emergency deparrtment if you experience any severe headaches, vision loss or weakness in the arms or legs.  You should follow up with your primary doctor to have you blood pressur checked routinely.  You should follow a low salt diet.      Diagnosis: Urinary retention  Assessment and Plan of Treatment: You were noted retaining urine requiring vidal catheter placement.  Attempts to remove vidal made with persisting urine retention.  You are being discharged with vidal in place.  -Vidal care per facility  -Trial of void when more active    Diagnosis: SIADH (syndrome of inappropriate ADH production)  Assessment and Plan of Treatment: associated with hyponatremia  Your serum sodium levels were low while here.  As reported by your family you have history of low serum sodium.  You were followed by nephrologist with close monitoring.  Your low sodium is likely due to syndrome of inappropriate ADH production which is likely chronic.   -Please take salt tablets as prescribed  -Fluid restriction 1liter/day  -f/u with PCP for continued serum sodium monitoring    Diagnosis: Constipation  Assessment and Plan of Treatment: acute on chronic  -Take laxatives as prescribed  -Increase actibvity as tolerated     PRINCIPAL DISCHARGE DIAGNOSIS  Diagnosis: Closed fracture of hip  Assessment and Plan of Treatment: Pain Management  DVT Prophylaxis with Venodynes & Lovenox  Sutures are absorbable. keep dressing clean and dry.   blister care  D/c aspirin on 1/20/24  Daily PT- WBAT with walker RLE  Follow-Up with Orthopedic Surgeon after Rehab   Follow up with Dr. Ion Landers, orthopaedic surgeon after discharge at [phone].      SECONDARY DISCHARGE DIAGNOSES  Diagnosis: HTN (hypertension)  Assessment and Plan of Treatment: You have a history of Hypertension.   Hypertension is high blood pressure. Your blood pressure is the force of your blood moving against the walls of your arteries. Hypertension causes your blood pressure to get so high that your heart has to work much harder than normal. This can damage your heart. The cause of hypertension may not be known. This is called essential or primary hypertension. Hypertension caused by another medical condition, such as kidney disease, is called secondary hypertension.  You should continue to take you anti hypertensives as prescribed.   You should return to the Emergency deparrtment if you experience any severe headaches, vision loss or weakness in the arms or legs.  You should follow up with your primary doctor to have you blood pressur checked routinely.  You should follow a low salt diet.      Diagnosis: SIADH (syndrome of inappropriate ADH production)  Assessment and Plan of Treatment: associated with hyponatremia  Your serum sodium levels were low while here.  As reported by your family you have history of low serum sodium.  You were followed by nephrologist with close monitoring.  Your low sodium is likely due to syndrome of inappropriate ADH production which is likely chronic.   -Please take salt tablets as prescribed  -Fluid restriction 1liter/day  -f/u with PCP for continued serum sodium monitoring    Diagnosis: Urinary retention  Assessment and Plan of Treatment: You were noted retaining urine requiring vidal catheter placement.  Attempts to remove vidal made with persisting urine retention.  You are being discharged with vidal in place.  -Vidal care per facility  -Trial of void when more active    Diagnosis: Constipation  Assessment and Plan of Treatment: acute on chronic  -Take laxatives as prescribed  -Increase actibvity as tolerated

## 2024-12-11 NOTE — DISCHARGE NOTE PROVIDER - COLLABORATE WITH
Progress Note -Surgery PA  Nadia Cheung 80 y o  female MRN: [id number]  Unit/Bed#: -01 Encounter: [id number]      Assessment   Acute appendicitis with perforation and abscess, clinically stable  - Leukocytosis improving: 10 9 (15 69), afebrile over 24h, abdominal pain improved, +BF  - CT scan scheduled for this afternoon  HTN  DM      Plan   Follow CT scan  Continue with diet (NPO for CT, then can have regular diet)  Continue with medical management per SLIM, appreciate input/recommendations    ______________________________________________________________________    Subjective:   Feeling well this morning  +flatus, and small BM  Denies fever chills  Abdominal pain improved  Denies SOB, CP, palpitations    Objective:       Vitals:  /70 (BP Location: Right arm)   Pulse 77   Temp 98 3 °F (36 8 °C) (Oral)   Resp 18   Ht 5' 2" (1 575 m)   Wt 75 5 kg (166 lb 7 2 oz)   SpO2 99%   BMI 30 44 kg/m²     I/Os:  I/O last 3 completed shifts: In: 300 [P O :300]  Out: -     I/O this shift:   In: 900 [P O :900]  Out: -     Invasive Devices     Peripheral Intravenous Line            Peripheral IV 07/02/18 Right Forearm 2 days                Medications:  Current Facility-Administered Medications   Medication Dose Route Frequency    acetaminophen (TYLENOL) tablet 650 mg  650 mg Oral Q6H PRN    amLODIPine (NORVASC) tablet 5 mg  5 mg Oral Daily    enoxaparin (LOVENOX) subcutaneous injection 40 mg  40 mg Subcutaneous Daily    hydrochlorothiazide (HYDRODIURIL) tablet 12 5 mg  12 5 mg Oral Daily    insulin lispro (HumaLOG) 100 units/mL subcutaneous injection 1-5 Units  1-5 Units Subcutaneous TID AC    iohexol (OMNIPAQUE) 240 MG/ML solution 50 mL  50 mL Oral 90 min pre-procedure    lisinopril (ZESTRIL) tablet 40 mg  40 mg Oral Daily    metoprolol succinate (TOPROL-XL) 24 hr tablet 100 mg  100 mg Oral Daily    ondansetron (ZOFRAN) injection 4 mg  4 mg Intravenous Q6H PRN    oxyCODONE-acetaminophen (PERCOCET) 5-325 mg per tablet 1 tablet  1 tablet Oral Q4H PRN    piperacillin-tazobactam (ZOSYN) 3 375 g in sodium chloride 0 9 % 50 mL IVPB  3 375 g Intravenous Q6H    pravastatin (PRAVACHOL) tablet 20 mg  20 mg Oral Daily    sodium chloride 0 9 % bolus 1,000 mL  1,000 mL Intravenous Once                 Lab Results and Cultures:   CBC with diff:   Lab Results   Component Value Date    WBC 10 91 (H) 07/05/2018    HGB 12 2 07/05/2018    HCT 37 2 07/05/2018    MCV 78 (L) 07/05/2018     07/05/2018    MCH 25 4 (L) 07/05/2018    MCHC 32 8 07/05/2018    RDW 14 2 07/05/2018    MPV 8 1 (L) 07/05/2018    NRBC 0 07/05/2018       BMP/CMP:  Lab Results   Component Value Date     07/04/2018    K 3 6 07/04/2018     07/04/2018    CO2 28 07/04/2018    ANIONGAP 9 07/04/2018    BUN 4 (L) 07/04/2018    CREATININE 0 76 07/04/2018    GLUCOSE 102 07/04/2018    CALCIUM 8 8 07/04/2018    AST 31 06/30/2018    ALT 28 06/30/2018    ALKPHOS 69 06/30/2018    PROT 7 9 06/30/2018    BILITOT 0 40 06/30/2018    EGFR 84 07/04/2018         Physical Exam:  General Appearance:    Alert and orientated x 3, cooperative, no distress, appears stated age   Lungs:     Clear to auscultation bilaterally, respirations unlabored, no wheezes    Heart:    Regular rate and rhythm, S1 and S2 normal, no murmur   Abdomen:    Normoactive BS, soft, non tender, non rigid, no masses, no palpated organomegaly   Extremities:  Extremities normal, no calf tenderness, no cyanosis or edema   Pulses:   2+ and symmetric all extremities   Skin:   Skin color, texture, turgor normal, no rashes   Neurologic:   CNII-XII intact, normal strength, affect appropriate       Imaging:  Ct Abdomen Pelvis With Contrast    Result Date: 6/30/2018  Impression: 1    Complex 4 8 cm right lower quadrant collection containing gas, fluid as well as peripheral enhancement and a large rounded internal calcification thought to most likely represent an appendicolith in a periappendiceal abscess from a ruptured appendicitis  Less likely differential diagnosis includes perforated malignancy and associated abscess  Surgical evaluation recommended  2   Lobulated spleen with numerous small splenic mass lesions, possibly hemangiomas  Other splenic lesions not excluded  Consider follow-up surveillance ultrasound in 3 months for further characterization and surveillance purposes  Considerations related to the patient's age and/or comorbidities may be used to alter these recommendations  3   Mild right hydroureteronephrosis likely secondary to the distal right ureter being enveloped by portions of the right lower quadrant abscess  4   Diverticulosis 5  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

## 2024-12-11 NOTE — DISCHARGE NOTE PROVIDER - NSDCMRMEDTOKEN_GEN_ALL_CORE_FT
acetaminophen 500 mg oral tablet: 2 tab(s) orally every 8 hours  amLODIPine 5 mg oral tablet: 1 tab(s) orally once a day as needed for breakthrough htn  atorvastatin 20 mg oral tablet: 1 tab(s) orally once a day (at bedtime)  bisacodyl 10 mg rectal suppository: 1 suppository(ies) rectal once a day As needed If no bowel movement  Diovan 160 mg oral capsule: 1 tab(s) orally once a day  hydroCHLOROthiazide 12.5 mg oral tablet: 1 tab(s) orally once a day  lidocaine 4% topical film: Apply topically to affected area once a day  melatonin 3 mg oral tablet: 1 tab(s) orally once a day (at bedtime) As needed Insomnia  metoprolol succinate 25 mg oral tablet, extended release: 1 tab(s) orally once a day  multivitamin: 1 tab(s) orally once a day  oxyCODONE 5 mg oral tablet: 1 tab(s) orally every 4 hours As needed Severe Pain (7 - 10)  polyethylene glycol 3350 oral powder for reconstitution: 17 gram(s) orally once a day  senna leaf extract oral tablet: 2 tab(s) orally once a day (at bedtime)  tamsulosin 0.4 mg oral capsule: 1 cap(s) orally once a day (at bedtime)   acetaminophen 500 mg oral tablet: 2 tab(s) orally every 8 hours  acetaminophen 500 mg oral tablet: 2 tab(s) orally every 8 hours  aluminum hydroxide-magnesium hydroxide 200 mg-200 mg/5 mL oral suspension: 30 milliliter(s) orally every 4 hours As needed Dyspepsia  atorvastatin 20 mg oral tablet: 1 tab(s) orally once a day (at bedtime)  bisacodyl 10 mg rectal suppository: 1 suppository(ies) rectal once a day As needed If no bowel movement  bisacodyl 5 mg oral delayed release tablet: 2 tab(s) orally once a day (at bedtime) As needed Constipation  Diovan 160 mg oral capsule: 1 tab(s) orally once a day  lidocaine 4% topical film: Apply topically to affected area once a day  magnesium hydroxide 8% oral suspension: 30 milliliter(s) orally once a day As needed Constipation  melatonin 3 mg oral tablet: 1 tab(s) orally once a day (at bedtime) As needed Insomnia  metoprolol succinate 25 mg oral tablet, extended release: 1 tab(s) orally once a day  multivitamin: 1 tab(s) orally once a day  oxyCODONE 5 mg oral tablet: 1 tab(s) orally every 4 hours As needed Severe Pain (7 - 10)  polyethylene glycol 3350 oral powder for reconstitution: 17 gram(s) orally 2 times a day  polyethylene glycol 3350 oral powder for reconstitution: 17 gram(s) orally once a day  senna leaf extract oral tablet: 2 tab(s) orally once a day (at bedtime)  sodium chloride 1 g oral tablet: 1 tab(s) orally 2 times a day  tamsulosin 0.4 mg oral capsule: 1 cap(s) orally once a day (at bedtime)   acetaminophen 500 mg oral tablet: 2 tab(s) orally every 8 hours  acetaminophen 500 mg oral tablet: 2 tab(s) orally every 8 hours  aluminum hydroxide-magnesium hydroxide 200 mg-200 mg/5 mL oral suspension: 30 milliliter(s) orally every 4 hours As needed Dyspepsia  aspirin 81 mg oral capsule: 1 cap(s) orally every 12 hours Please take aspirin 81 mg two times per day for 6 weeks, Discontinue aspirin on 1/20/24  atorvastatin 20 mg oral tablet: 1 tab(s) orally once a day (at bedtime)  bisacodyl 10 mg rectal suppository: 1 suppository(ies) rectal once a day As needed If no bowel movement  bisacodyl 5 mg oral delayed release tablet: 2 tab(s) orally once a day (at bedtime) As needed Constipation  Diovan 160 mg oral capsule: 1 tab(s) orally once a day  lidocaine 4% topical film: Apply topically to affected area once a day  magnesium hydroxide 8% oral suspension: 30 milliliter(s) orally once a day As needed Constipation  melatonin 3 mg oral tablet: 1 tab(s) orally once a day (at bedtime) As needed Insomnia  metoprolol succinate 25 mg oral tablet, extended release: 1 tab(s) orally once a day  multivitamin: 1 tab(s) orally once a day  oxyCODONE 5 mg oral tablet: 1 tab(s) orally every 4 hours As needed Severe Pain (7 - 10)  polyethylene glycol 3350 oral powder for reconstitution: 17 gram(s) orally 2 times a day  polyethylene glycol 3350 oral powder for reconstitution: 17 gram(s) orally once a day  senna leaf extract oral tablet: 2 tab(s) orally once a day (at bedtime)  sodium chloride 1 g oral tablet: 1 tab(s) orally 2 times a day  tamsulosin 0.4 mg oral capsule: 1 cap(s) orally once a day (at bedtime)   acetaminophen 500 mg oral tablet: 2 tab(s) orally every 8 hours  acetaminophen 500 mg oral tablet: 2 tab(s) orally every 8 hours  aluminum hydroxide-magnesium hydroxide 200 mg-200 mg/5 mL oral suspension: 30 milliliter(s) orally every 4 hours As needed Dyspepsia  aspirin 81 mg oral capsule: 1 cap(s) orally every 12 hours Please take aspirin 81 mg two times per day for 6 weeks, Discontinue aspirin on 1/20/24  atorvastatin 20 mg oral tablet: 1 tab(s) orally once a day (at bedtime)  bisacodyl 10 mg rectal suppository: 1 suppository(ies) rectal once a day As needed If no bowel movement  bisacodyl 5 mg oral delayed release tablet: 2 tab(s) orally once a day (at bedtime) As needed Constipation  cefuroxime 500 mg oral tablet: 1 tab(s) orally 2 times a day  Diovan 160 mg oral capsule: 1 tab(s) orally once a day  lidocaine 4% topical film: Apply topically to affected area once a day  magnesium hydroxide 8% oral suspension: 30 milliliter(s) orally once a day As needed Constipation  melatonin 3 mg oral tablet: 1 tab(s) orally once a day (at bedtime) As needed Insomnia  metoprolol succinate 25 mg oral tablet, extended release: 1 tab(s) orally once a day  multivitamin: 1 tab(s) orally once a day  oxyCODONE 5 mg oral tablet: 1 tab(s) orally every 4 hours As needed Severe Pain (7 - 10)  polyethylene glycol 3350 oral powder for reconstitution: 17 gram(s) orally 2 times a day  polyethylene glycol 3350 oral powder for reconstitution: 17 gram(s) orally once a day  senna leaf extract oral tablet: 2 tab(s) orally once a day (at bedtime)  sodium chloride 1 g oral tablet: 1 tab(s) orally 2 times a day  tamsulosin 0.4 mg oral capsule: 1 cap(s) orally once a day (at bedtime)

## 2024-12-11 NOTE — CHART NOTE - NSCHARTNOTEFT_GEN_A_CORE
Pt. remains hyponatremic with Na 122.  Discussed with nephro, will start hypertonic IVF.  Pt. was transfused 1 unit PRBCs for Hgn 9.1, post transfusion Hgn 9.2 (inappropriate response), likely blood drawn too soon after transfusion.  Will f/u

## 2024-12-11 NOTE — PROGRESS NOTE ADULT - PROBLEM SELECTOR PLAN 1
Pt presented after a mechanical fall with acute right hip pain which is somatic and neuropathic in nature due to right hip fracture.  Pt is s/p 12/9 insertion of locking intramedullary bola into right hip POD #2. High risk medications reviewed. Avoid polypharmacy. Avoid IV opioids. Avoid NSAIDs and benzodiazepines. Non-pharmacological sleep aides initiated. Non-opioid medications and non-pharmacological pain management measures initiated.   Opioid pain recommendations  Discharge planning: home Vs. Rehab pending PT.  - Continue Oxycodone 2.5/5 mg PO q 4 hours PRN severe pain. Monitor for sedation/ respiratory depression.   Non-opioid pain recommendations   - Continue Acetaminophen 1 gram PO q 8 hours for 4 days. Monitor LFTs  - Continue Lidoderm 4% patch daily.   Bowel Regimen  - Miralax 17G PO daily  - Senna 2 tablets at bedtime for constipation  - Dulcolax 10 mg DE daily PRN  Mild pain 1 - 3  - Non-pharmacological pain treatment recommendations  - Warm/ Cool packs PRN   - Repositioning, guided imagery, relaxation, distraction.  - Physical therapy OOB if no contraindications   Recommendations discussed with primary team and RN.

## 2024-12-11 NOTE — CONSULT NOTE ADULT - ASSESSMENT
Patient is a 89yo Female with HTN on HCTZ, Spinal stenosis,  RT Breast CA s/p lumpectomy, RT/ chemo p/w Rt hip pain s/p mechanical fall. Pt a/w Rt intertrochanteric fracture s/p OR 12/9 with insertion of locking intramedullary bola into right hip. Nephrology consulted for hyponatremia    1. Hyponatremia  2. Essential HTN  3. Anemia  4. Rt intertrochanteric fracture- s/p OR 12/9 with insertion of locking intramedullary bola into right hip. Avoid NSAIDs due to hypotension.     [facility] NEPHROLOGY  Yamil Liz M.D.  Uriel Arevalo D.O.  Domenica Sandoval M.D.  MD Marion Yun, MSN, ANP-C    Telephone: [phone]  Facsimile: [phone]    [address] [place] [address], [address]  [place] [address]   Patient is a 89yo Female with HTN on HCTZ, Spinal stenosis,  RT Breast CA s/p lumpectomy, RT/ chemo p/w Rt hip pain s/p mechanical fall. Pt a/w Rt intertrochanteric fracture s/p OR 12/9 with insertion of locking intramedullary bola into right hip. Nephrology consulted for hyponatremia    1. Hyponatremia- s/p NaCl 2g PO x1 with prbc today. Repeat Serum Na 122; lower. Urine Na <5; consistent with hypovolemia. Urine osm and Serum Osm pending. Will give 1.5% NaCl @ 50ml/hr x 10 hrs. Repeat urine sodium, urine osmolality and serum osmolality in am Check TSH and AM Cortisol. Check CMP in am. Monitor serial BMP's and UO for overcorrection. Do not correct more than 8 meQ/24 hours. Monitor serum sodium.  2. Essential HTN-BP improved. c/w Valsartan. Continue to hold HCTZ due to hyponatremia; recc to hold on discharge due to risk of hyponatremia marguerite in the elderly. Monitor BP  3. Urinary retention- pt with vidal. c/w flomax. Recc good bowel regimen for constipation  4. Rt intertrochanteric fracture- s/p OR 12/9 with insertion of locking intramedullary bola into right hip. Avoid NSAIDs due to hypotension.     [facility] NEPHROLOGY  Yamil Liz M.D.  Uriel Arevalo D.O.  Domenica Sandoval M.D.  MD Marion Yun, MSN, ANP-C    Telephone: [phone]  Facsimile: [phone] [place] [address], [address]  [place] [address]

## 2024-12-11 NOTE — PROGRESS NOTE ADULT - SUBJECTIVE AND OBJECTIVE BOX
Source of information: KAELA DRAKE, Chart review  Patient language: Ghanaian  : Elayne rehman    HPI:  89 yo F, lives alone HHA  8hrs/6 days, ambulates with a walker, with PMH of HTN, Spinal stenosis,  RT Breast CA s/p lumpectomy, radiation and chemo, present with right hip pain s/p unwitnessed mechanical fall. Patient reports that she walked into her bathroom without a walker when she accidentally slipped and fell on her right side. Patient denies any head trauma, LOC, headache, vision change, chest pain, numbness/tingling sensation. Patient reports since the fall she has been  unable to ambulates.    In the ED,   v/s: 177/80, 73, 97.4F, 96% RA  Labs: WBC 12, Hgb 14  EK BPM, NSR  CXR: negative; There is lower lumbar degeneration and left lumbar curve  Xray RT Hip:  There is an intertrochanteric fracture of the right hip with increased  angulation at the fracture site.  Xray RT Femur:  Lower femur are intact.  Xray RT knee : No effusion. Arterial calcifications. Diffuse moderate degeneration. No fracture  s/p Morphine 2mg  s/p bolus   (08 Dec 2024 18:02)    Pt is admitted s/p mechanical fall with an intertrochanteric fracture of the right hip.  Pt is s/p / insertion of locking intramedullary bola into right hip. POD #2. Pain service consulted for management of right hip pain. Pt seen and examined at bedside this morning with daughter present in room. Pt found lying in bed, awake, alert and oriented x 3, speech clear, no acute distress noted. Pt is Ghanaian speaking, daughter Elayne assisting with translation. Pt reports no pain on right hip at this time 0/10; although c/o left heel/ankle pain 8/10 which is elevated on towel. SCALE USED: (1-10 VNRS). Pt describes right hip pain when present as aching and radiates to the right thigh. Pain is alleviated by pain medication and exacerbated by movement. Pt reports left heel pain as aching. Pt tolerating PO diet. Denies lethargy, chest pain, SOB, nausea, vomiting, constipation. Reports last BM . Patient stated goal for pain control: to be able to take deep breaths, get out of bed to chair and ambulate with tolerable pain control. Per IStop review pt takes Tramadol for pain at home. Per pt's daughter pt OOB to chair yesterday and tolerated well. Pt/daughter instructed to use pain meds as needed for pain control.    PAST MEDICAL & SURGICAL HISTORY:  HTN (hypertension)    Breast cancer    Spinal stenosis    H/O breast surgery    FAMILY HISTORY:  No pertinent family history in first degree relatives    Social History:  Lives Alone, HHA  8hrs/ 6 days (08 Dec 2024 18:02)   [x]Denies ETOH use, illicit drug use, and smoking     Allergies    penicillin (Unknown)  Novocain (Unknown)    Intolerances    MEDICATIONS  (STANDING):  acetaminophen     Tablet .. 1000 milliGRAM(s) Oral every 8 hours  atorvastatin 20 milliGRAM(s) Oral at bedtime  chlorhexidine 2% Cloths 1 Application(s) Topical once  enoxaparin Injectable 30 milliGRAM(s) SubCutaneous every 12 hours  lidocaine   4% Patch 1 Patch Transdermal daily  metoprolol succinate ER 25 milliGRAM(s) Oral daily  multivitamin 1 Tablet(s) Oral daily  polyethylene glycol 3350 17 Gram(s) Oral daily  povidone iodine 10% Nasal Swab 1 Application(s) Both Nostrils once  senna 2 Tablet(s) Oral at bedtime  tamsulosin 0.4 milliGRAM(s) Oral at bedtime  valsartan 160 milliGRAM(s) Oral daily    MEDICATIONS  (PRN):  aluminum hydroxide/magnesium hydroxide/simethicone Suspension 30 milliLiter(s) Oral every 4 hours PRN Dyspepsia  magnesium hydroxide Suspension 30 milliLiter(s) Oral daily PRN Constipation  melatonin 3 milliGRAM(s) Oral at bedtime PRN Insomnia  morphine  - Injectable 2 milliGRAM(s) IV Push every 4 hours PRN Moderate Pain (4 - 6)  ondansetron Injectable 4 milliGRAM(s) IV Push every 8 hours PRN Nausea and/or Vomiting  ondansetron Injectable 4 milliGRAM(s) IV Push every 6 hours PRN Nausea and/or Vomiting  oxyCODONE    IR 2.5 milliGRAM(s) Oral every 4 hours PRN Moderate Pain (4 - 6)  oxyCODONE    IR 5 milliGRAM(s) Oral every 4 hours PRN Severe Pain (7 - 10)    Vital Signs Last 24 Hrs  T(C): 36.7 (11 Dec 2024 05:54), Max: 36.7 (11 Dec 2024 05:12)  T(F): 98.1 (11 Dec 2024 05:54), Max: 98.1 (11 Dec 2024 05:12)  HR: 77 (11 Dec 2024 11:43) (65 - 86)  BP: 133/67 (11 Dec 2024 11:43) (86/54 - 138/71)  BP(mean): --  RR: 18 (11 Dec 2024 10:56) (17 - 18)  SpO2: 95% (11 Dec 2024 10:56) (95% - 97%)    Parameters below as of 11 Dec 2024 10:56  Patient On (Oxygen Delivery Method): room air    LABS: Reviewed                        9.1    8.05  )-----------( 105      ( 11 Dec 2024 05:40 )             25.9     12-11    123[L]  |  92[L]  |  22[H]  ----------------------------<  151[H]  4.5   |  24  |  0.61    Ca    7.8[L]      11 Dec 2024 05:40    Urinalysis Basic - ( 11 Dec 2024 05:40 )    Color: x / Appearance: x / SG: x / pH: x  Gluc: 151 mg/dL / Ketone: x  / Bili: x / Urobili: x   Blood: x / Protein: x / Nitrite: x   Leuk Esterase: x / RBC: x / WBC x   Sq Epi: x / Non Sq Epi: x / Bacteria: x    CAPILLARY BLOOD GLUCOSE    Radiology: Reviewed  ACC: [id number] EXAM:  XR KNEE AP LAT OBL 3 VIEWS RT   ORDERED BY:  WEI BRYSON     ACC: [id number] EXAM:  XR FEMUR 2 VIEWS RT   ORDERED BY:  WEI BRYSON     ACC: [id number] EXAM:  XR CHEST PORTABLE URGENT 1V   ORDERED BY:  WEI BRYSON     ACC: [id number] EXAM:  XR HIP WITH PELV 1V RT   ORDERED BY:  WEI BRYSON     *** ADDENDUM # 1 ***    ADDENDUM.    Right hip with pelvis, right femur, and right knee findings are as   follows.    Right hip with pelvis. 3 views.    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 There is mild   symmetric hip degeneration and arterial calcification.    There is an intertrochanteric fracture of the right hip with increased   angulation at the fracture site.    Right femur. 4 views. Lower femur are intact.    Right knee. 3 views. No effusion. Arterial calcifications. Diffuse   moderate degeneration. No fracture.    Intertrochanteric fracture right hip.    --- End of Report ---    *** END OF ADDENDUM # 1 ***    PROCEDURE DATE:  2024      INTERPRETATION:  AP semierect chest on 2024 at 3:43 PM.   Patient had trauma.    Elevated diaphragms crowds the chest.    Heart magnified by technique.    No definite acute finding or visible fracture.    Findings are similar to 2021.    IMPRESSION: No acute finding.    --- End of Report ---    ***Please see the addendum at the top of this report. It may contain   additional important information or changes.****    MARIA D JURADO MD; Attending Radiologist  This document has been electronically signed. Dec  8 2024  4:19PM  1st Addendum: MARIA D JURADO MD; Attending Radiologist  The first addendum was electronically signed on: Dec  8 2024  6:29PM.    ORT Score -   Family Hx of substance abuse	Female	      Male  Alcohol 	                                           1                     3  Illegal drugs	                                   2                     3  Rx drugs                                           4 	                  4  Personal Hx of substance abuse		  Alcohol 	                                          3	                  3  Illegal drugs                                     4	                  4  Rx drugs                                            5 	                  5  Age between 16- 45 years	           1                     1  hx preadolescent sexual abuse	   3 	                  0  Psychological disease		  ADD, OCD, bipolar, schizophrenia   2	          2  Depression                                           1 	          1  Total: 0    a score of 3 or lower indicates low risk for opioid abuse		  a score of 4-7 indicates moderate risk for opioid abuse		  a score of 8 or higher indicates high risk for opioid abuse    REVIEW OF SYSTEMS:  CONSTITUTIONAL: No fever or fatigue  HEENT:  No difficulty hearing, no change in vision  NECK: No pain or stiffness  RESPIRATORY: No cough, wheezing, chills or hemoptysis; No shortness of breath  CARDIOVASCULAR: No chest pain, palpitations, dizziness, or leg swelling  GASTROINTESTINAL: No loss of appetite, decreased PO intake. No abdominal or epigastric pain. No nausea, vomiting; No diarrhea or constipation.   GENITOURINARY: No dysuria, frequency, hematuria, retention or incontinence  MUSCULOSKELETAL: + intermittent right hip and c/o heel/ankle pain, + right hip/thigh swelling, no upper or left lower motor strength weakness, no saddle anesthesia, bowel/bladder incontinence, + fall, + occasional urinary retention based on her schedule  NEURO: No headaches, No numbness/tingling b/l LE, No weakness  ENDOCRINE: No polyuria, polydipsia, heat or cold intolerance; No hair loss  PSYCHIATRIC: No depression, anxiety or difficulty sleeping    PHYSICAL EXAM:  GENERAL:  Alert & Oriented X 4, cooperative, NAD, Good concentration. Speech is clear. Ghanaian speaking  RESPIRATORY: Respirations even and unlabored. Clear to auscultation bilaterally; No rales, rhonchi, wheezing, or rubs  CARDIOVASCULAR: Normal S1/S2, regular rate and rhythm; No murmurs, rubs, or gallops. No JVD.   GASTROINTESTINAL:  Soft, Nontender, Nondistended; Bowel sounds present  GENITOURINARY: vidal catheter in place, with clear yellow urine draining.   PERIPHERAL VASCULAR:  Extremities warm with mild to moderate right hip swelling POD #2. + Peripheral Pulses, No cyanosis, No calf tenderness  MUSCULOSKELETAL: Motor Strength 5/5 B/L upper and left lower extremities; moves all extremities equally against gravity except RLE s/p R hip surgery on 12 POD #2, + tenderness on palpation of right hip, left heel/ankle  SKIN: Warm, dry, intact. Right hip dressing is clean dry and intact.       Risk factors associated with adverse outcomes related to opioid treatment  [ ]  Concurrent benzodiazepine use  [ ]  History/ Active substance use or alcohol use disorder  [ ] Psychiatric co-morbidity  [ ] Sleep apnea  [ ] COPD  [ ] BMI> 35  [ ] Liver dysfunction  [ ] Renal dysfunction  [ ] CHF  [ ] Smoker  [x]  Age > 60 years    [x]  [place]  Reviewed and Copied to Chart. See below.    Plan of care and goal oriented pain management treatment options were discussed with patient and /or primary care giver; all questions and concerns were addressed and care was aligned with patient's wishes.    Educated patient on goal oriented pain management treatment options     24 @ 15:21

## 2024-12-11 NOTE — DISCHARGE NOTE PROVIDER - HOSPITAL COURSE
89 yo F, lives alone HHA  8hrs/6 days, ambulates with a walker, with PMH of HTN, Spinal stenosis,  RT Breast CA s/p lumpectomy, radiation and chemo, present with right hip pain after sustaining  an unwitnessed mechanical fall. . Patient denies any head trauma, LOC, headache, vision change, chest pain, numbness/tingling sensation. Patient reports since the fall she has been  unable to ambulates.  CXR: negative; There is lower lumbar degeneration and left lumbar curve  Xray RT Hip:  There is an intertrochanteric fracture of the right hip with increased  angulation at the fracture site.  Xray RT Femur:  Lower femur are intact.  Xray RT knee : No effusion. Arterial calcifications. Diffuse moderate degeneration. No fracture    Pt. admitted for right hip fracture, followed by ortho, underwent right hemiarthroplasty 12/9, tolerated procedure well.  Pt. with hyponatremia on admission with Na 130.......  Hospital course c/b urinary retention requiring vidal placement.......  PT recc PAVITHRA, pt. and family in agreement.      INCOMPLETE 89 yo F, lives alone HHA  8hrs/6 days, ambulates with a walker, with PMH of HTN, Spinal stenosis,  RT Breast CA s/p lumpectomy, radiation and chemo, present with right hip pain after sustaining  an unwitnessed mechanical fall. . Patient denies any head trauma, LOC, headache, vision change, chest pain, numbness/tingling sensation. Patient reports since the fall she has been  unable to ambulates.  CXR: negative; There is lower lumbar degeneration and left lumbar curve  Xray RT Hip:  There is an intertrochanteric fracture of the right hip with increased  angulation at the fracture site.  Xray RT Femur:  Lower femur are intact.  Xray RT knee : No effusion. Arterial calcifications. Diffuse moderate degeneration. No fracture    Pt. admitted for right hip fracture, followed by ortho, underwent right hemiarthroplasty 12/9, tolerated procedure well.  Pt. with hyponatremia on admission with Na 130 which was dropped to 124 during this admission.  As per daughter pt's sodium level has been 130s for past few years, tx with Salt tab as per Nephro and now maintains around 130s, advise to monitor BMP periodically.  Hospital course c/b urinary retention requiring vidal placement, failed TOV, d/c with vidal and reattempt [facility] the [facility]   PT recc PAVITHRA, pt. and family in agreement.      INCOMPLETE 89 yo F, lives alone HHA  8hrs/6 days, ambulates with a walker, with PMH of HTN, Spinal stenosis,  RT Breast CA s/p lumpectomy, radiation and chemo, present with right hip pain after sustaining  an unwitnessed mechanical fall. . Patient denies any head trauma, LOC, headache, vision change, chest pain, numbness/tingling sensation. Patient reports since the fall she has been  unable to ambulates.  CXR: negative; There is lower lumbar degeneration and left lumbar curve  Xray RT Hip:  There is an intertrochanteric fracture of the right hip with increased  angulation at the fracture site.  Xray RT Femur:  Lower femur are intact.  Xray RT knee : No effusion. Arterial calcifications. Diffuse moderate degeneration. No fracture    Pt. admitted for right hip fracture, followed by ortho, underwent right hemiarthroplasty 12/9, tolerated procedure well.  Pt. with hyponatremia on admission with Na 130 which was dropped to 124 during this admission.  As per daughter pt's sodium level has been 130s for past few years, tx with Salt tab as per Nephro and now maintains around 130s, advise to monitor BMP periodically.  Hospital course complicated by urinary retention requiring vidal placement .......................INCOMPLETE ..................  Hospital course also complicated constipation and distended gaseous distention on abdominal x-ray.   CT abdomen was negative for any obstruction.     Patient now tolerating a oral diet.    PT recommends PAVITHRA, patient and family in agreement.      INCOMPLETE 89 yo F, lives alone HHA  8hrs/6 days, ambulates with a walker, with PMH of HTN, Spinal stenosis,  RT Breast CA s/p lumpectomy, radiation and chemo, present with right hip pain after sustaining  an unwitnessed mechanical fall. . Patient denies any head trauma, LOC, headache, vision change, chest pain, numbness/tingling sensation. Patient reports since the fall she has been  unable to ambulates.  CXR: negative; There is lower lumbar degeneration and left lumbar curve  Xray RT Hip:  There is an intertrochanteric fracture of the right hip with increased  angulation at the fracture site.  Xray RT Femur:  Lower femur are intact.  Xray RT knee : No effusion. Arterial calcifications. Diffuse moderate degeneration. No fracture    Pt. admitted for right hip fracture, followed by ortho, underwent right hemiarthroplasty 12/9, tolerated procedure well.  Pt. with hyponatremia on admission with Na 130 which was dropped to 124 during this admission.  As per daughter pt's sodium level has been 130s for past few years, tx with Salt tab as per Nephro and now maintains around 130s, advise to monitor BMP periodically.  Hospital course complicated by urinary retention requiring vidal placement, failed TOV x 2, discharged with vidal in place.  Hospital course further complicated by constipation and distended gaseous distention on abdominal x-ray.   CT abdomen was negative for any obstruction.     Patient now tolerating a oral diet.    PT recommends PAVITHRA, patient and family in agreement, chose [facility].      Discussed with attending.  Pt. is medically stable for discharge.

## 2024-12-11 NOTE — PROGRESS NOTE ADULT - PROBLEM SELECTOR PLAN 4
Hx of HTN on Diovan 160mg, Metoprolol suc 25mg QD as per sure script  - c/w home med  with parameters.  - DASH diet  - MED REC completed 14.2-->12.7-->12.9-->13.0-->9.1  Transfused 1 unit PRBCs  f/u response

## 2024-12-11 NOTE — PROGRESS NOTE ADULT - PROBLEM SELECTOR PLAN 2
Hx of Right Breast CA s/p Lumpectomy, radiation and chemo 24 years ago  Currently in remission required vidal placement after PVR>400ml x 2  Cont Flomax  May need to discharge with vidal

## 2024-12-11 NOTE — CHART NOTE - NSCHARTNOTEFT_GEN_A_CORE
Nephro Dr. Sandoval consulted for worsening hyponatremia Na 130-->123. Nephro Dr. Sandoval consulted for worsening hyponatremia Na 130-->123.    Per nephro ordered urine/serum osmolality, urine Na, 2gm sodium tab PO x 1.

## 2024-12-11 NOTE — DISCHARGE NOTE PROVIDER - CARE PROVIDER_API CALL
Ion Landers  Orthopaedic Trauma  [address], [address] [place] [address]  Phone: [phone]  Fax: [phone]  Follow Up Time: 2 weeks

## 2024-12-11 NOTE — CONSULT NOTE ADULT - SUBJECTIVE AND OBJECTIVE BOX
[facility] NEPHROLOGY- CONSULTATION NOTE    Patient is a 89yo Female with HTN on HCTZ, Spinal stenosis,  RT Breast CA s/p lumpectomy, RT/ chemo p/w Rt hip pain s/p mechanical fall. Pt a/w Rt intertrochanteric fracture s/p OR 12/9 with insertion of locking intramedullary bola into right hip. Nephrology consulted for hyponatremia    Pt uses HCTZ as an outpt for HTN. +miralax for constipation.   Pt denies any dizziness, SOB, chest pain, v/d, abd pain,  or LE edema. +mild Rt hip pain with movement +nausea  +constipation; last BM Sunday    PAST MEDICAL & SURGICAL HISTORY:  HTN (hypertension)      Breast cancer      Spinal stenosis      H/O breast surgery        penicillin (Unknown)  Novocain (Unknown)    Home Medications Reviewed  Hospital Medications:   MEDICATIONS  (STANDING):  acetaminophen     Tablet .. 1000 milliGRAM(s) Oral every 8 hours  atorvastatin 20 milliGRAM(s) Oral at bedtime  chlorhexidine 2% Cloths 1 Application(s) Topical once  enoxaparin Injectable 30 milliGRAM(s) SubCutaneous every 12 hours  lidocaine   4% Patch 1 Patch Transdermal daily  metoprolol succinate ER 25 milliGRAM(s) Oral daily  multivitamin 1 Tablet(s) Oral daily  polyethylene glycol 3350 17 Gram(s) Oral daily  povidone iodine 10% Nasal Swab 1 Application(s) Both Nostrils once  senna 2 Tablet(s) Oral at bedtime  sodium chloride 1.5%. 500 milliLiter(s) (50 mL/Hr) IV Continuous <Continuous>  tamsulosin 0.4 milliGRAM(s) Oral at bedtime    SOCIAL HISTORY:  Denies ETOh, Smoking, or drug use  FAMILY HISTORY:  No pertinent family history in first degree relatives        REVIEW OF SYSTEMS:  Gen: no changes in weight  HEENT: no rhinorrhea  Neck: no sore throat  Cards: no chest pain  Resp: no dyspnea  GI: +nausea no vomiting or diarrhea +constipation  : no dysuria or hematuria  Vascular: no LE edema, +mild Rt hip pain  Derm: no rashes  Neuro: no numbness/tingling  All other review of systems is negative unless indicated above.    VITALS:  T(F): 98.1 (12-11-24 @ 05:54), Max: 98.1 (12-11-24 @ 05:12)  HR: 77 (12-11-24 @ 11:43)  BP: 133/67 (12-11-24 @ 11:43)  RR: 18 (12-11-24 @ 10:56)  SpO2: 95% (12-11-24 @ 10:56)  Wt(kg): --    12-10 @ 07:01  -  12-11 @ 07:00  --------------------------------------------------------  IN: 1750 mL / OUT: 1025 mL / NET: 725 mL    12-11 @ 07:01  -  12-11 @ 20:07  --------------------------------------------------------  IN: 0 mL / OUT: 400 mL / NET: -400 mL        PHYSICAL EXAM:  Gen: NAD, calm  HEENT: anicteric  Neck: no JVD  Cards: RRR, +S1/S2, no M/G/R  Resp: CTA B/L  GI: soft, NT/ND, NABS  : +vidal  Extremities: +trace RLE edema; no LE edema  Derm: no rashes  Neuro: non-focal    LABS:  12-11  122 <--, 123 <--, 130 <--, 129 <--, 129 <--, 130 <--  122[L]  |  93[L]  |  21[H]  ----------------------------<  201[H]  5.0   |  24  |  0.65    Ca    7.8[L]      11 Dec 2024 18:00      Creatinine Trend: 0.65 <--, 0.61 <--, 0.75 <--, 0.64 <--, 0.56 <--, 0.68 <--                        9.2    7.21  )-----------( 98       ( 11 Dec 2024 18:00 )             26.5     Urine Studies:  Urinalysis Basic - ( 11 Dec 2024 18:00 )    Color:  / Appearance:  / SG:  / pH:   Gluc: 201 mg/dL / Ketone:   / Bili:  / Urobili:    Blood:  / Protein:  / Nitrite:    Leuk Esterase:  / RBC:  / WBC    Sq Epi:  / Non Sq Epi:  / Bacteria:       Sodium, Random Urine: <5 mmol/L (12-11 @ 10:09)    RADIOLOGY & ADDITIONAL STUDIES:        < from: Xray Femur 2 Views, Right (12.08.24 @ 16:16) >  ACC: [id number] EXAM:  XR KNEE AP LAT OBL 3 VIEWS RT   ORDERED BY:  WEI BRYSON     ACC: [id number] EXAM:  XR FEMUR 2 VIEWS RT   ORDERED BY:  WEI BRYSON     ACC: [id number] EXAM:  XR CHEST PORTABLE URGENT 1V   ORDERED BY:  WEI BRYSON     ACC: [id number] EXAM:  XR HIP WITH PELV 1V RT   ORDERED BY:  WEI BRYSON       < end of copied text >  < from: Xray Femur 2 Views, Right (12.08.24 @ 16:16) >  *** ADDENDUM # 1 ***    ADDENDUM.    Right hip with pelvis, right femur, and right knee findings are as   follows.    Right hip with pelvis. 3 views.    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 There is mild   symmetric hip degeneration and arterial calcification.    There is an intertrochanteric fracture of the right hip with increased   angulation at the fracture site.    Right femur. 4 views. Lower femur are intact.    Right knee. 3 views. No effusion. Arterial calcifications. Diffuse   moderate degeneration. No fracture.    Intertrochanteric fracture right hip.      < end of copied text >

## 2024-12-11 NOTE — PROGRESS NOTE ADULT - ASSESSMENT
89 yo F, lives alone HHA  8hrs/6 days, ambulates with a walker, with PMH of HTN, Spinal stenosis,  RT Breast CA s/p lumpectomy, radiation and chemo, present with right hip pain s/p unwitnessed mechanical fall. Admitted for intertrochanteric fracture of right hip, followed by ortho, now s/p right hip IM nailing POD#1.  Hospital course c/b urinary retention requiring vidal placement on 12/10 night.  Hospital course further c/b worsening hyponatremia, nephro Dr. Sandoval consulted.           [Follow-Up Visit] : a follow-up visit for [Back Pain] : back pain

## 2024-12-11 NOTE — PROGRESS NOTE ADULT - ASSESSMENT
Search Terms: KAELA SEBASTIAN, 08/30/1932Search Date: 12/10/2024 10:51:30 AM  The Drug Utilization Report below displays all of the controlled substance prescriptions, if any, that your patient has filled in the last twelve months. The information displayed on this report is compiled from pharmacy submissions to the Department, and accurately reflects the information as submitted by the pharmacies.    This report was requested by: Mariann Gooden | Reference #: [id number]    You have not added a JORGE number. Keeping your JORGE number(s) up to date on the My JORGE # tab will enable the separation of your prescriptions from others in the search results.    Practitioner Count: 1  Pharmacy Count: 1  Current Opioid Prescriptions: 0  Current Benzodiazepine Prescriptions: 0  Current Stimulant Prescriptions: 0      Patient Demographic Information (PDI)       PDI	First Name	Last Name	Birth Date	Gender	Street Address	[facility] Code  A	Kaela Sebastian	08/30/1932	Female	[id number]	[facility]	[id number]    Prescription Information      PDI Filter:    PDI	Current Rx	Drug Type	Rx Written	Rx Dispensed	Drug	Quantity	Days Supply	Prescriber Name	Prescriber JORGE #	Payment Method	Dispenser  A	N	O	10/09/2024	10/09/2024	tramadol hcl 50 mg tablet	30	30	Demetri Cordova MD	[id number]	Insurance	[facility]  A	N	O	08/29/2023	12/12/2023	tramadol hcl 50 mg tablet	23	23	Demetri Cordova MD	[id number]	Insurance	[facility]    * - Details of Drug Type : O = Opioid, B = Benzodiazepine, S = Stimulant    * - Drugs marked with an asterisk are compound drugs. If the compound drug is made up of more than one controlled substance, then each controlled substance will be a separate row in the table.

## 2024-12-11 NOTE — CHART NOTE - NSCHARTNOTEFT_GEN_A_CORE
As per RN's order pt is to be bladder scan at 4AM and if the BS is greater than 400 cc is to leave Potter in as this is the 3 rd time pt will have to be straight cath.  Bladder scan done at 4 AM was >400 cc. So Potter order was placed and order for Flomax 0.4 mg, PO at bedtime was ordered.

## 2024-12-11 NOTE — DISCHARGE NOTE PROVIDER - NSDCCPTREATMENT_GEN_ALL_CORE_FT
PRINCIPAL PROCEDURE  Procedure: Insertion of locking intramedullary bola into right hip  Findings and Treatment: If patient has drainage from the wound, please contact the surgeon's office.   If patient has intractable pain, please contact the surgeon's office.   If excessively warm at the incision site is noted, please contact the surgeon's office.   If redness is noted, especially spreading, please contact the surgeon's office.   If patient has fever over 101F please return to the hospital through the Emergency Room.   If chely blood is saturating the dressing, please return to the hospital through the Emergency Room.  If drainage of fluid or pus is noted, please return to the hospital through the Emergency Room.     PRINCIPAL PROCEDURE  Procedure: Insertion of locking intramedullary bola into right hip  Findings and Treatment: S/p Right hip IM nail 12/9/24  If patient has drainage from the wound, please contact the surgeon's office.   If patient has intractable pain, please contact the surgeon's office.   If excessively warm at the incision site is noted, please contact the surgeon's office.   If redness is noted, especially spreading, please contact the surgeon's office.   If patient has fever over 101F please return to the hospital through the Emergency Room.   If chely blood is saturating the dressing, please return to the hospital through the Emergency Room.  If drainage of fluid or pus is noted, please return to the hospital through the Emergency Room.

## 2024-12-11 NOTE — PROGRESS NOTE ADULT - SUBJECTIVE AND OBJECTIVE BOX
KAELA DRAKE  89yFemale [id number]      Diagnosis:  S/p Right Hip IM nail POD#2    Patient is seen and evaluated at bedside with her daughter present. Patient with no acute complaints at this time .  Pain is mild; well controlled with current pain regiment, states only with minimal pain to right hip , no other complaints of pain  PT OOB with PT yesterday. Hemoglobin dropped from 13.0 to 9.1, denies any chest pain or palpitations at this time.   Pt denies Fever, Chest pain, SOB, dyspnea, paresthesias, N/V/D, abdominal pain, syncope, or pain anywhere else.       Vital Signs Last 24 Hrs  T(C): 36.7 (11 Dec 2024 05:54), Max: 36.7 (11 Dec 2024 05:12)  T(F): 98.1 (11 Dec 2024 05:54), Max: 98.1 (11 Dec 2024 05:12)  HR: 69 (11 Dec 2024 05:54) (67 - 86)  BP: 138/71 (11 Dec 2024 05:54) (102/61 - 147/75)  BP(mean): --  RR: 17 (11 Dec 2024 05:54) (17 - 18)  SpO2: 95% (11 Dec 2024 05:54) (95% - 97%)    Parameters below as of 11 Dec 2024 05:54  Patient On (Oxygen Delivery Method): room air    Physical Exam:  General: AAOx3, in NAD, resting comfortably in bed.  Right Hip: Dressing stained distally. Wound edges are intact. dressing changed with mepilex. Skin is pink and warm. No erythema. SILT.  Wound with no drainage, healing well, no signs of active bleeding   Lower extremity:  No calf tenderness, calves are soft. 2+pulses. NVI. 5/5 Strength of EHL/FHL/ADF/APF b/l.  Good capillary refill. Warm, well-perfused.                                        9.1    8.05  )-----------( 105      ( 11 Dec 2024 05:40 )             25.9   12-11    123[L]  |  92[L]  |  22[H]  ----------------------------<  151[H]  4.5   |  24  |  0.61    Ca    7.8[L]      11 Dec 2024 05:40          Impression:   89yFemaleS/p Right Hip IM nail POD#2  Plan:  -  Pain management  -  Hyponatremia management per medicine team  -  Blood loss anemia, hemoglobin dropped from 13 to 9.1 will continue to monitor.   -  DVT prophylaxis with Lovenox and venodynes  -  Daily Physical Therapy:  WBAT on RLE with appropriate assistive device  -  Discharge planning: Rehab pending  -  Encouraged use of incentive spirometer  -  Case d/w Dr. Landers

## 2024-12-11 NOTE — DISCHARGE NOTE PROVIDER - NSDCACTIVITY_GEN_ALL_CORE
Do not drive or operate machinery/No heavy lifting/straining/Follow Instructions Provided by your Surgical Team Do not drive or operate machinery/No heavy lifting/straining/Follow Instructions Provided by your Surgical Team/Activity as tolerated

## 2024-12-11 NOTE — PROGRESS NOTE ADULT - PROBLEM SELECTOR PLAN 1
Present with right hip pain s/p unwitnessed mechanical fall.  RT HIP: Tenderness to palpation. lower extremity shortened in comparison to left hip.  CXR: negative; There is lower lumbar degeneration and left lumbar curve  Xray RT Hip:  There is an intertrochanteric fracture of the right hip with increased  angulation at the fracture site.  Xray RT Femur:  Lower femur are intact.  Xray RT knee : No effusion. Arterial calcifications. Diffuse moderate degeneration. No fracture  s/p Morphine 2mg  Ortho following  Now s/p right hip IM nailing 12/9  Pain mgnt recc appreciated  PT recc PAVITHRA Present with right hip pain s/p unwitnessed mechanical fall.  RT HIP: Tenderness to palpation. lower extremity shortened in comparison to left hip.  CXR: negative; There is lower lumbar degeneration and left lumbar curve  Xray RT Hip:  There is an intertrochanteric fracture of the right hip with increased  angulation at the fracture site.  Xray RT Femur:  Lower femur are intact.  Xray RT knee : No effusion. Arterial calcifications. Diffuse moderate degeneration. No fracture  Ortho following  Now s/p right hip IM nailing 12/9  Pain mgnt recc appreciated  PT recc PAVITHRA, CM following

## 2024-12-11 NOTE — DISCHARGE NOTE PROVIDER - ATTENDING DISCHARGE PHYSICAL EXAMINATION:
Vital Signs Last 24 Hrs  T(C): 36.9 (16 Dec 2024 05:38), Max: 36.9 (16 Dec 2024 05:38)  T(F): 98.4 (16 Dec 2024 05:38), Max: 98.4 (16 Dec 2024 05:38)  HR: 73 (16 Dec 2024 10:26) (73 - 90)  BP: 106/67 (16 Dec 2024 10:26) (106/67 - 137/75)  BP(mean): 82 (16 Dec 2024 05:38) (82 - 82)  RR: 18 (16 Dec 2024 05:38) (18 - 18)  SpO2: 95% (16 Dec 2024 05:38) (95% - 98%)    Parameters below as of 16 Dec 2024 05:38  Patient On (Oxygen Delivery Method): room air    CONSTITUTIONAL: Well appearing, well nourished, awake, alert and in no apparent distress  ENMT: Airway patent, Nasal mucosa clear. Mouth with normal mucosa. Throat has no vesicles, no oropharyngeal exudates and uvula is midline.  EYES: Clear bilaterally, pupils equal, round and reactive to light. EOMI.  CARDIAC: Normal rate, regular rhythm.  Heart sounds S1, S2.  No murmurs, rubs or gallops   RESPIRATORY: Breath sounds clear and equal bilaterally. No wheezes, rales or rhonchi  MUSCULOSKELETAL: Spine appears normal, range of motion is not limited, no muscle or joint tenderness  EXTREMITIES: No edema, cyanosis or deformity , RT hip w/ ecchymosis and small blisters   NEUROLOGICAL: Alert and oriented, no focal deficits, no motor or sensory deficits.  SKIN: No rash, skin turgor

## 2024-12-11 NOTE — PROGRESS NOTE ADULT - PROBLEM SELECTOR PLAN 5
DVT ppx:  Heparin today and hold tomorrow for surgery Hx of Right Breast CA s/p Lumpectomy, radiation and chemo 24 years ago  Currently in remission

## 2024-12-11 NOTE — PROGRESS NOTE ADULT - SUBJECTIVE AND OBJECTIVE BOX
NP Note discussed with  Primary Attending    Patient is a 89y old  Female who presents with a chief complaint of Closed fracture of hip. (11 Dec 2024 08:52)      INTERVAL HPI/OVERNIGHT EVENTS: no new complaints    MEDICATIONS  (STANDING):  acetaminophen     Tablet .. 1000 milliGRAM(s) Oral every 8 hours  atorvastatin 20 milliGRAM(s) Oral at bedtime  chlorhexidine 2% Cloths 1 Application(s) Topical once  enoxaparin Injectable 30 milliGRAM(s) SubCutaneous every 12 hours  lidocaine   4% Patch 1 Patch Transdermal daily  metoprolol succinate ER 25 milliGRAM(s) Oral daily  multivitamin 1 Tablet(s) Oral daily  polyethylene glycol 3350 17 Gram(s) Oral daily  povidone iodine 10% Nasal Swab 1 Application(s) Both Nostrils once  senna 2 Tablet(s) Oral at bedtime  sodium chloride 2 Gram(s) Oral once  sodium chloride 0.9% Bolus 250 milliLiter(s) IV Bolus once  sodium chloride 0.9%. 1000 milliLiter(s) (125 mL/Hr) IV Continuous <Continuous>  tamsulosin 0.4 milliGRAM(s) Oral at bedtime  valsartan 160 milliGRAM(s) Oral daily    MEDICATIONS  (PRN):  aluminum hydroxide/magnesium hydroxide/simethicone Suspension 30 milliLiter(s) Oral every 4 hours PRN Dyspepsia  magnesium hydroxide Suspension 30 milliLiter(s) Oral daily PRN Constipation  melatonin 3 milliGRAM(s) Oral at bedtime PRN Insomnia  morphine  - Injectable 2 milliGRAM(s) IV Push every 4 hours PRN Moderate Pain (4 - 6)  ondansetron Injectable 4 milliGRAM(s) IV Push every 8 hours PRN Nausea and/or Vomiting  ondansetron Injectable 4 milliGRAM(s) IV Push every 6 hours PRN Nausea and/or Vomiting  oxyCODONE    IR 2.5 milliGRAM(s) Oral every 4 hours PRN Moderate Pain (4 - 6)  oxyCODONE    IR 5 milliGRAM(s) Oral every 4 hours PRN Severe Pain (7 - 10)      __________________________________________________  REVIEW OF SYSTEMS:    CONSTITUTIONAL: No fever,   EYES: no acute visual disturbances  NECK: No pain or stiffness  RESPIRATORY: No cough; No shortness of breath  CARDIOVASCULAR: No chest pain, no palpitations  GASTROINTESTINAL: No pain. No nausea or vomiting; No diarrhea   NEUROLOGICAL: No headache or numbness, no tremors  MUSCULOSKELETAL: No joint pain, no muscle pain  GENITOURINARY: no dysuria, no frequency, no hesitancy  PSYCHIATRY: no depression , no anxiety  ALL OTHER  ROS negative        Vital Signs Last 24 Hrs  T(C): 36.7 (11 Dec 2024 05:54), Max: 36.7 (11 Dec 2024 05:12)  T(F): 98.1 (11 Dec 2024 05:54), Max: 98.1 (11 Dec 2024 05:12)  HR: 65 (11 Dec 2024 09:30) (65 - 86)  BP: 86/54 (11 Dec 2024 09:30) (86/54 - 147/75)  BP(mean): --  RR: 17 (11 Dec 2024 05:54) (17 - 18)  SpO2: 95% (11 Dec 2024 05:54) (95% - 97%)    Parameters below as of 11 Dec 2024 05:54  Patient On (Oxygen Delivery Method): room air        ________________________________________________  PHYSICAL EXAM:  GENERAL: NAD  HEENT: Normocephalic;  conjunctivae and sclerae clear; moist mucous membranes;   NECK : supple  CHEST/LUNG: Clear to auscultation bilaterally with good air entry   HEART: S1 S2  regular; no murmurs, gallops or rubs  ABDOMEN: Soft, Nontender, Nondistended; Bowel sounds present  EXTREMITIES: no cyanosis; no edema; no calf tenderness  SKIN: warm and dry; no rash  NERVOUS SYSTEM:  Awake and alert; Oriented  to place, person and time ; no new deficits    _________________________________________________  LABS:                        9.1    8.05  )-----------( 105      ( 11 Dec 2024 05:40 )             25.9     12-11    123[L]  |  92[L]  |  22[H]  ----------------------------<  151[H]  4.5   |  24  |  0.61    Ca    7.8[L]      11 Dec 2024 05:40        Urinalysis Basic - ( 11 Dec 2024 05:40 )    Color: x / Appearance: x / SG: x / pH: x  Gluc: 151 mg/dL / Ketone: x  / Bili: x / Urobili: x   Blood: x / Protein: x / Nitrite: x   Leuk Esterase: x / RBC: x / WBC x   Sq Epi: x / Non Sq Epi: x / Bacteria: x      CAPILLARY BLOOD GLUCOSE    RADIOLOGY & ADDITIONAL TESTS:    < from: Xray Femur 2 Views, Right (12.08.24 @ 16:16) >    ACC: [id number] EXAM:  XR KNEE AP LAT OBL 3 VIEWS RT   ORDERED BY:  WEI BRYSON     ACC: [id number] EXAM:  XR FEMUR 2 VIEWS RT   ORDERED BY:  WEI BRYSON     ACC: [id number] EXAM:  XR CHEST PORTABLE URGENT 1V   ORDERED BY:  WEI BRYSON     ACC: [id number] EXAM:  XR HIP WITH PELV 1V RT   ORDERED BY:  WEI BRYSON     *** ADDENDUM # 1 ***    ADDENDUM.    Right hip with pelvis, right femur, and right knee findings are as   follows.    Right hip with pelvis. 3 views.    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 There is mild   symmetric hip degeneration and arterial calcification.    There is an intertrochanteric fracture of the right hip with increased   angulation at the fracture site.    Right femur. 4 views. Lower femur are intact.    Right knee. 3 views. No effusion. Arterial calcifications. Diffuse   moderate degeneration. No fracture.    Intertrochanteric fracture right hip.    --- End of Report ---    *** END OF ADDENDUM # 1 ***      PROCEDURE DATE:  12/08/2024          INTERPRETATION:  AP semierect chest on December 8, 2024 at 3:43 PM.   Patient had trauma.    Elevated diaphragms crowds the chest.    Heart magnified by technique.    No definite acute finding or visible fracture.    Findings are similar to April 14, 2021.    IMPRESSION: No acute finding.    --- End of Report ---    < end of copied text >      < from: Xray Knee 3 Views, Right (12.08.24 @ 16:15) >    ACC: [id number] EXAM:  XR KNEE AP LAT OBL 3 VIEWS RT   ORDERED BY:  WEI BRYSON     ACC: [id number] EXAM:  XR FEMUR 2 VIEWS RT   ORDERED BY:  WEI BRYSON     ACC: [id number] EXAM:  XR CHEST PORTABLE URGENT 1V   ORDERED BY:  WEI BRYSON     ACC: [id number] EXAM:  XR HIP WITH PELV 1V RT   ORDERED BY:  WEI BRYSON     *** ADDENDUM # 1 ***    ADDENDUM.    Right hip with pelvis, right femur, and right knee findings are as   follows.    Right hip with pelvis. 3 views.    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 There is mild   symmetric hip degeneration and arterial calcification.    There is an intertrochanteric fracture of the right hip with increased   angulation at the fracture site.    Right femur. 4 views. Lower femur are intact.    Right knee. 3 views. No effusion. Arterial calcifications. Diffuse   moderate degeneration. No fracture.    Intertrochanteric fracture right hip.    --- End of Report ---    *** END OF ADDENDUM # 1 ***      PROCEDURE DATE:  12/08/2024          INTERPRETATION:  AP semierect chest on December 8, 2024 at 3:43 PM.   Patient had trauma.    Elevated diaphragms crowds the chest.    Heart magnified by technique.    No definite acute finding or visible fracture.    Findings are similar to April 14, 2021.    IMPRESSION: No acute finding.    --- End of Report ---    < end of copied text >        Imaging Personally Reviewed:  YES/NO    Consultant(s) Notes Reviewed:   YES/ No    Care Discussed with Consultants :     Plan of care was discussed with patient and /or primary care giver; all questions and concerns were addressed and care was aligned with patient's wishes.     NP Note discussed with  Primary Attending    Patient is a 89y old  Female who presents with a chief complaint of Closed fracture of hip. (11 Dec 2024 08:52).  Pt. seen at bedside, daughter in attendance.  Discussed hyponatremia with pt.'s daughter, informed that serum sodium needs to improve before pt. can be discharged to rehab.  Also informed about mild anemia requiring 1 unit PRBCs transfusion.  Pt. and daughter agree with plan of care.      INTERVAL HPI/OVERNIGHT EVENTS: no new complaints    MEDICATIONS  (STANDING):  acetaminophen     Tablet .. 1000 milliGRAM(s) Oral every 8 hours  atorvastatin 20 milliGRAM(s) Oral at bedtime  chlorhexidine 2% Cloths 1 Application(s) Topical once  enoxaparin Injectable 30 milliGRAM(s) SubCutaneous every 12 hours  lidocaine   4% Patch 1 Patch Transdermal daily  metoprolol succinate ER 25 milliGRAM(s) Oral daily  multivitamin 1 Tablet(s) Oral daily  polyethylene glycol 3350 17 Gram(s) Oral daily  povidone iodine 10% Nasal Swab 1 Application(s) Both Nostrils once  senna 2 Tablet(s) Oral at bedtime  sodium chloride 2 Gram(s) Oral once  sodium chloride 0.9% Bolus 250 milliLiter(s) IV Bolus once  sodium chloride 0.9%. 1000 milliLiter(s) (125 mL/Hr) IV Continuous <Continuous>  tamsulosin 0.4 milliGRAM(s) Oral at bedtime  valsartan 160 milliGRAM(s) Oral daily    MEDICATIONS  (PRN):  aluminum hydroxide/magnesium hydroxide/simethicone Suspension 30 milliLiter(s) Oral every 4 hours PRN Dyspepsia  magnesium hydroxide Suspension 30 milliLiter(s) Oral daily PRN Constipation  melatonin 3 milliGRAM(s) Oral at bedtime PRN Insomnia  morphine  - Injectable 2 milliGRAM(s) IV Push every 4 hours PRN Moderate Pain (4 - 6)  ondansetron Injectable 4 milliGRAM(s) IV Push every 8 hours PRN Nausea and/or Vomiting  ondansetron Injectable 4 milliGRAM(s) IV Push every 6 hours PRN Nausea and/or Vomiting  oxyCODONE    IR 2.5 milliGRAM(s) Oral every 4 hours PRN Moderate Pain (4 - 6)  oxyCODONE    IR 5 milliGRAM(s) Oral every 4 hours PRN Severe Pain (7 - 10)      __________________________________________________  REVIEW OF SYSTEMS:    CONSTITUTIONAL: No fever,   EYES: no acute visual disturbances  NECK: No pain or stiffness  RESPIRATORY: No cough; No shortness of breath  CARDIOVASCULAR: No chest pain, no palpitations  GASTROINTESTINAL: No pain. No nausea or vomiting; No diarrhea   NEUROLOGICAL: No headache or numbness, no tremors  MUSCULOSKELETAL: No joint pain, no muscle pain  GENITOURINARY: no dysuria, no frequency, no hesitancy  PSYCHIATRY: no depression , no anxiety  ALL OTHER  ROS negative        Vital Signs Last 24 Hrs  T(C): 36.7 (11 Dec 2024 05:54), Max: 36.7 (11 Dec 2024 05:12)  T(F): 98.1 (11 Dec 2024 05:54), Max: 98.1 (11 Dec 2024 05:12)  HR: 65 (11 Dec 2024 09:30) (65 - 86)  BP: 86/54 (11 Dec 2024 09:30) (86/54 - 147/75)  BP(mean): --  RR: 17 (11 Dec 2024 05:54) (17 - 18)  SpO2: 95% (11 Dec 2024 05:54) (95% - 97%)    Parameters below as of 11 Dec 2024 05:54  Patient On (Oxygen Delivery Method): room air        ________________________________________________  PHYSICAL EXAM:  Well developed  GENERAL: NAD  HEENT: Normocephalic;  conjunctivae and sclerae clear; moist mucous membranes;   NECK : supple  CHEST/LUNG: Clear to auscultation bilaterally with good air entry   HEART: S1 S2  regular; no murmurs, gallops or rubs  ABDOMEN: Soft, Nontender, Nondistended; Bowel sounds present  EXTREMITIES: no cyanosis; no edema; no calf tenderness  SKIN: warm and dry; no rash  NERVOUS SYSTEM:  Awake and alert; Oriented  to place, person and time ; no new deficits    _________________________________________________  LABS:                        9.1    8.05  )-----------( 105      ( 11 Dec 2024 05:40 )             25.9     12-11    123[L]  |  92[L]  |  22[H]  ----------------------------<  151[H]  4.5   |  24  |  0.61    Ca    7.8[L]      11 Dec 2024 05:40        Urinalysis Basic - ( 11 Dec 2024 05:40 )    Color: x / Appearance: x / SG: x / pH: x  Gluc: 151 mg/dL / Ketone: x  / Bili: x / Urobili: x   Blood: x / Protein: x / Nitrite: x   Leuk Esterase: x / RBC: x / WBC x   Sq Epi: x / Non Sq Epi: x / Bacteria: x      CAPILLARY BLOOD GLUCOSE    RADIOLOGY & ADDITIONAL TESTS:    < from: Xray Femur 2 Views, Right (12.08.24 @ 16:16) >    ACC: [id number] EXAM:  XR KNEE AP LAT OBL 3 VIEWS RT   ORDERED BY:  WEI BRYSON     ACC: [id number] EXAM:  XR FEMUR 2 VIEWS RT   ORDERED BY:  WEI BRYSON     ACC: [id number] EXAM:  XR CHEST PORTABLE URGENT 1V   ORDERED BY:  WEI BRYSON     ACC: [id number] EXAM:  XR HIP WITH PELV 1V RT   ORDERED BY:  WEI BRYSON     *** ADDENDUM # 1 ***    ADDENDUM.    Right hip with pelvis, right femur, and right knee findings are as   follows.    Right hip with pelvis. 3 views.    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 There is mild   symmetric hip degeneration and arterial calcification.    There is an intertrochanteric fracture of the right hip with increased   angulation at the fracture site.    Right femur. 4 views. Lower femur are intact.    Right knee. 3 views. No effusion. Arterial calcifications. Diffuse   moderate degeneration. No fracture.    Intertrochanteric fracture right hip.    --- End of Report ---    *** END OF ADDENDUM # 1 ***      PROCEDURE DATE:  12/08/2024          INTERPRETATION:  AP semierect chest on December 8, 2024 at 3:43 PM.   Patient had trauma.    Elevated diaphragms crowds the chest.    Heart magnified by technique.    No definite acute finding or visible fracture.    Findings are similar to April 14, 2021.    IMPRESSION: No acute finding.    --- End of Report ---    < end of copied text >      < from: Xray Knee 3 Views, Right (12.08.24 @ 16:15) >    ACC: [id number] EXAM:  XR KNEE AP LAT OBL 3 VIEWS RT   ORDERED BY:  WEI BRYSON     ACC: [id number] EXAM:  XR FEMUR 2 VIEWS RT   ORDERED BY:  WEI BRYSON     ACC: [id number] EXAM:  XR CHEST PORTABLE URGENT 1V   ORDERED BY:  WEI BRYSON     ACC: [id number] EXAM:  XR HIP WITH PELV 1V RT   ORDERED BY:  WEI BRYSON     *** ADDENDUM # 1 ***    ADDENDUM.    Right hip with pelvis, right femur, and right knee findings are as   follows.    Right hip with pelvis. 3 views.    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 There is mild   symmetric hip degeneration and arterial calcification.    There is an intertrochanteric fracture of the right hip with increased   angulation at the fracture site.    Right femur. 4 views. Lower femur are intact.    Right knee. 3 views. No effusion. Arterial calcifications. Diffuse   moderate degeneration. No fracture.    Intertrochanteric fracture right hip.    --- End of Report ---    *** END OF ADDENDUM # 1 ***      PROCEDURE DATE:  12/08/2024          INTERPRETATION:  AP semierect chest on December 8, 2024 at 3:43 PM.   Patient had trauma.    Elevated diaphragms crowds the chest.    Heart magnified by technique.    No definite acute finding or visible fracture.    Findings are similar to April 14, 2021.    IMPRESSION: No acute finding.    --- End of Report ---    < end of copied text >        Imaging Personally Reviewed:  YES/NO    Consultant(s) Notes Reviewed:   YES/ No    Care Discussed with Consultants :     Plan of care was discussed with patient and /or primary care giver; all questions and concerns were addressed and care was aligned with patient's wishes.

## 2024-12-12 LAB
ALBUMIN SERPL ELPH-MCNC: 2.4 G/DL — LOW (ref 3.5–5)
ALP SERPL-CCNC: 49 U/L — SIGNIFICANT CHANGE UP (ref 40–120)
ALT FLD-CCNC: 10 U/L DA — SIGNIFICANT CHANGE UP (ref 10–60)
ANION GAP SERPL CALC-SCNC: 3 MMOL/L — LOW (ref 5–17)
ANION GAP SERPL CALC-SCNC: 5 MMOL/L — SIGNIFICANT CHANGE UP (ref 5–17)
AST SERPL-CCNC: 17 U/L — SIGNIFICANT CHANGE UP (ref 10–40)
BASOPHILS # BLD AUTO: 0.03 K/UL — SIGNIFICANT CHANGE UP (ref 0–0.2)
BASOPHILS NFR BLD AUTO: 0.5 % — SIGNIFICANT CHANGE UP (ref 0–2)
BILIRUB SERPL-MCNC: 0.7 MG/DL — SIGNIFICANT CHANGE UP (ref 0.2–1.2)
BUN SERPL-MCNC: 12 MG/DL — SIGNIFICANT CHANGE UP (ref 7–18)
BUN SERPL-MCNC: 13 MG/DL — SIGNIFICANT CHANGE UP (ref 7–18)
CALCIUM SERPL-MCNC: 7.9 MG/DL — LOW (ref 8.4–10.5)
CALCIUM SERPL-MCNC: 8 MG/DL — LOW (ref 8.4–10.5)
CHLORIDE SERPL-SCNC: 96 MMOL/L — SIGNIFICANT CHANGE UP (ref 96–108)
CHLORIDE SERPL-SCNC: 97 MMOL/L — SIGNIFICANT CHANGE UP (ref 96–108)
CO2 SERPL-SCNC: 24 MMOL/L — SIGNIFICANT CHANGE UP (ref 22–31)
CO2 SERPL-SCNC: 26 MMOL/L — SIGNIFICANT CHANGE UP (ref 22–31)
CREAT SERPL-MCNC: 0.5 MG/DL — SIGNIFICANT CHANGE UP (ref 0.5–1.3)
CREAT SERPL-MCNC: 0.67 MG/DL — SIGNIFICANT CHANGE UP (ref 0.5–1.3)
EGFR: 82 ML/MIN/1.73M2 — SIGNIFICANT CHANGE UP
EGFR: 88 ML/MIN/1.73M2 — SIGNIFICANT CHANGE UP
EOSINOPHIL # BLD AUTO: 0.08 K/UL — SIGNIFICANT CHANGE UP (ref 0–0.5)
EOSINOPHIL NFR BLD AUTO: 1.2 % — SIGNIFICANT CHANGE UP (ref 0–6)
GLUCOSE SERPL-MCNC: 133 MG/DL — HIGH (ref 70–99)
GLUCOSE SERPL-MCNC: 171 MG/DL — HIGH (ref 70–99)
HCT VFR BLD CALC: 26.8 % — LOW (ref 34.5–45)
HCT VFR BLD CALC: 27.1 % — LOW (ref 34.5–45)
HGB BLD-MCNC: 8.8 G/DL — LOW (ref 11.5–15.5)
HGB BLD-MCNC: 9.5 G/DL — LOW (ref 11.5–15.5)
IMM GRANULOCYTES NFR BLD AUTO: 0.9 % — SIGNIFICANT CHANGE UP (ref 0–0.9)
LYMPHOCYTES # BLD AUTO: 1.37 K/UL — SIGNIFICANT CHANGE UP (ref 1–3.3)
LYMPHOCYTES # BLD AUTO: 20.7 % — SIGNIFICANT CHANGE UP (ref 13–44)
MAGNESIUM SERPL-MCNC: 1.8 MG/DL — SIGNIFICANT CHANGE UP (ref 1.6–2.6)
MCHC RBC-ENTMCNC: 29.3 PG — SIGNIFICANT CHANGE UP (ref 27–34)
MCHC RBC-ENTMCNC: 29.7 PG — SIGNIFICANT CHANGE UP (ref 27–34)
MCHC RBC-ENTMCNC: 32.8 G/DL — SIGNIFICANT CHANGE UP (ref 32–36)
MCHC RBC-ENTMCNC: 35.1 G/DL — SIGNIFICANT CHANGE UP (ref 32–36)
MCV RBC AUTO: 84.7 FL — SIGNIFICANT CHANGE UP (ref 80–100)
MCV RBC AUTO: 89.3 FL — SIGNIFICANT CHANGE UP (ref 80–100)
MONOCYTES # BLD AUTO: 0.78 K/UL — SIGNIFICANT CHANGE UP (ref 0–0.9)
MONOCYTES NFR BLD AUTO: 11.8 % — SIGNIFICANT CHANGE UP (ref 2–14)
NEUTROPHILS # BLD AUTO: 4.31 K/UL — SIGNIFICANT CHANGE UP (ref 1.8–7.4)
NEUTROPHILS NFR BLD AUTO: 64.9 % — SIGNIFICANT CHANGE UP (ref 43–77)
NRBC # BLD: 0 /100 WBCS — SIGNIFICANT CHANGE UP (ref 0–0)
NRBC # BLD: 0 /100 WBCS — SIGNIFICANT CHANGE UP (ref 0–0)
OSMOLALITY SERPL: 264 MOSMOL/KG — LOW (ref 280–301)
OSMOLALITY SERPL: 265 MOSMOL/KG — LOW (ref 280–301)
OSMOLALITY UR: 392 MOSM/KG — SIGNIFICANT CHANGE UP (ref 50–1200)
OSMOLALITY UR: 605 MOSM/KG — SIGNIFICANT CHANGE UP (ref 50–1200)
PHOSPHATE SERPL-MCNC: 2.5 MG/DL — SIGNIFICANT CHANGE UP (ref 2.5–4.5)
PLATELET # BLD AUTO: 100 K/UL — LOW (ref 150–400)
PLATELET # BLD AUTO: 72 K/UL — LOW (ref 150–400)
POTASSIUM SERPL-MCNC: 4.6 MMOL/L — SIGNIFICANT CHANGE UP (ref 3.5–5.3)
POTASSIUM SERPL-MCNC: 5.7 MMOL/L — HIGH (ref 3.5–5.3)
POTASSIUM SERPL-SCNC: 4.6 MMOL/L — SIGNIFICANT CHANGE UP (ref 3.5–5.3)
POTASSIUM SERPL-SCNC: 5.7 MMOL/L — HIGH (ref 3.5–5.3)
PROT SERPL-MCNC: 5.2 G/DL — LOW (ref 6–8.3)
RBC # BLD: 3 M/UL — LOW (ref 3.8–5.2)
RBC # BLD: 3.2 M/UL — LOW (ref 3.8–5.2)
RBC # FLD: 13.6 % — SIGNIFICANT CHANGE UP (ref 10.3–14.5)
RBC # FLD: 14.1 % — SIGNIFICANT CHANGE UP (ref 10.3–14.5)
SODIUM SERPL-SCNC: 124 MMOL/L — LOW (ref 135–145)
SODIUM SERPL-SCNC: 127 MMOL/L — LOW (ref 135–145)
SODIUM UR-SCNC: 64 MMOL/L — SIGNIFICANT CHANGE UP
TSH SERPL-MCNC: 2.04 UU/ML — SIGNIFICANT CHANGE UP (ref 0.34–4.82)
URATE SERPL-MCNC: 2.1 MG/DL — LOW (ref 2.5–7)
WBC # BLD: 5.62 K/UL — SIGNIFICANT CHANGE UP (ref 3.8–10.5)
WBC # BLD: 6.63 K/UL — SIGNIFICANT CHANGE UP (ref 3.8–10.5)
WBC # FLD AUTO: 5.62 K/UL — SIGNIFICANT CHANGE UP (ref 3.8–10.5)
WBC # FLD AUTO: 6.63 K/UL — SIGNIFICANT CHANGE UP (ref 3.8–10.5)

## 2024-12-12 PROCEDURE — 93970 EXTREMITY STUDY: CPT | Mod: 26

## 2024-12-12 PROCEDURE — 99233 SBSQ HOSP IP/OBS HIGH 50: CPT

## 2024-12-12 RX ORDER — SODIUM CHLORIDE 3 G/100ML
500 INJECTION, SOLUTION INTRAVENOUS
Refills: 0 | Status: DISCONTINUED | OUTPATIENT
Start: 2024-12-12 | End: 2024-12-16

## 2024-12-12 RX ORDER — SODIUM CHLORIDE 9 MG/ML
2 INJECTION, SOLUTION INTRAMUSCULAR; INTRAVENOUS; SUBCUTANEOUS
Refills: 0 | Status: DISCONTINUED | OUTPATIENT
Start: 2024-12-12 | End: 2024-12-12

## 2024-12-12 RX ADMIN — LIDOCAINE 1 PATCH: 40 CREAM TOPICAL at 23:46

## 2024-12-12 RX ADMIN — LIDOCAINE 1 PATCH: 40 CREAM TOPICAL at 19:37

## 2024-12-12 RX ADMIN — VALSARTAN 160 MILLIGRAM(S): 320 TABLET ORAL at 21:03

## 2024-12-12 RX ADMIN — Medication 2 TABLET(S): at 21:03

## 2024-12-12 RX ADMIN — ACETAMINOPHEN 500MG 1000 MILLIGRAM(S): 500 TABLET, COATED ORAL at 21:53

## 2024-12-12 RX ADMIN — Medication 20 MILLIGRAM(S): at 21:03

## 2024-12-12 RX ADMIN — POLYETHYLENE GLYCOL 3350 17 GRAM(S): 17 POWDER, FOR SOLUTION ORAL at 11:33

## 2024-12-12 RX ADMIN — SODIUM CHLORIDE 50 MILLILITER(S): 3 INJECTION, SOLUTION INTRAVENOUS at 19:56

## 2024-12-12 RX ADMIN — ACETAMINOPHEN 500MG 1000 MILLIGRAM(S): 500 TABLET, COATED ORAL at 05:25

## 2024-12-12 RX ADMIN — OXYCODONE HYDROCHLORIDE 5 MILLIGRAM(S): 30 TABLET ORAL at 18:56

## 2024-12-12 RX ADMIN — Medication 10 MILLIGRAM(S): at 11:32

## 2024-12-12 RX ADMIN — LIDOCAINE 1 PATCH: 40 CREAM TOPICAL at 11:32

## 2024-12-12 RX ADMIN — ENOXAPARIN SODIUM 30 MILLIGRAM(S): 30 INJECTION SUBCUTANEOUS at 20:55

## 2024-12-12 RX ADMIN — Medication 1 TABLET(S): at 11:33

## 2024-12-12 RX ADMIN — OXYCODONE HYDROCHLORIDE 5 MILLIGRAM(S): 30 TABLET ORAL at 09:34

## 2024-12-12 RX ADMIN — OXYCODONE HYDROCHLORIDE 5 MILLIGRAM(S): 30 TABLET ORAL at 10:34

## 2024-12-12 RX ADMIN — ENOXAPARIN SODIUM 30 MILLIGRAM(S): 30 INJECTION SUBCUTANEOUS at 09:34

## 2024-12-12 RX ADMIN — TAMSULOSIN HYDROCHLORIDE 0.4 MILLIGRAM(S): 0.4 CAPSULE ORAL at 21:03

## 2024-12-12 RX ADMIN — ACETAMINOPHEN 500MG 1000 MILLIGRAM(S): 500 TABLET, COATED ORAL at 06:00

## 2024-12-12 RX ADMIN — ACETAMINOPHEN 500MG 1000 MILLIGRAM(S): 500 TABLET, COATED ORAL at 21:03

## 2024-12-12 RX ADMIN — METOPROLOL TARTRATE 25 MILLIGRAM(S): 100 TABLET, FILM COATED ORAL at 05:25

## 2024-12-12 NOTE — DIETITIAN INITIAL EVALUATION ADULT - PERTINENT LABORATORY DATA
12-12    127[L]  |  96  |  13  ----------------------------<  133[H]  4.6   |  26  |  0.50    Ca    7.9[L]      12 Dec 2024 05:34  Phos  2.5     12-12  Mg     1.8     12-12    TPro  5.2[L]  /  Alb  2.4[L]  /  TBili  0.7  /  DBili  x   /  AST  17  /  ALT  10  /  AlkPhos  49  12-12   normal...

## 2024-12-12 NOTE — PROGRESS NOTE ADULT - SUBJECTIVE AND OBJECTIVE BOX
[facility] NEPHROLOGY- PROGRESS NOTE    Patient is a 89yo Female with HTN on HCTZ, Spinal stenosis,  RT Breast CA s/p lumpectomy, RT/ chemo p/w Rt hip pain s/p mechanical fall. Pt a/w Rt intertrochanteric fracture s/p OR 12/9 with insertion of locking intramedullary bola into right hip. Nephrology consulted for hyponatremia    Hospital Medications: Medications reviewed.  REVIEW OF SYSTEMS:  CONSTITUTIONAL: No fevers or chills  RESPIRATORY: No shortness of breath  CARDIOVASCULAR: No chest pain.  GASTROINTESTINAL: +nausea, No vomiting, diarrhea or abdominal pain. +constipated; no BM  VASCULAR: No bilateral lower extremity edema. +b/l leg pain post PT    VITALS:  T(F): 97.8 (12-12-24 @ 05:15), Max: 98 (12-11-24 @ 19:48)  HR: 86 (12-12-24 @ 05:15)  BP: 132/72 (12-12-24 @ 05:15)  RR: 17 (12-12-24 @ 05:15)  SpO2: 99% (12-12-24 @ 05:15)  Wt(kg): --  Height (cm): 157.5 (12-08 @ 14:22)  Weight (kg): 79.4 (12-08 @ 14:22)  BMI (kg/m2): 32 (12-08 @ 14:22)  BSA (m2): 1.81 (12-08 @ 14:22)    12-11 @ 07:01  -  12-12 @ 07:00  --------------------------------------------------------  IN: 0 mL / OUT: 2300 mL / NET: -2300 mL      PHYSICAL EXAM:  Constitutional: NAD  HEENT: anicteric sclera,   Respiratory: CTAB, no wheezes, rales or rhonchi  Cardiovascular: S1, S2, RRR  Gastrointestinal: BS+, soft, NT/ND  : +vidal.  Extremities: trace RLE edema; no LLE edema     LABS:  12-12  127 <--, 122 <--, 123 <--, 130 <--, 129 <--, 129 <--, 130 <--  127[L]  |  96  |  13  ----------------------------<  133[H]  4.6   |  26  |  0.50    Ca    7.9[L]      12 Dec 2024 05:34  Phos  2.5     12-12  Mg     1.8     12-12    TPro  5.2[L]  /  Alb  2.4[L]  /  TBili  0.7  /  DBili      /  AST  17  /  ALT  10  /  AlkPhos  49  12-12    Creatinine Trend: 0.50 <--, 0.65 <--, 0.61 <--, 0.75 <--, 0.64 <--, 0.56 <--, 0.68 <--                        9.5    6.63  )-----------( 100      ( 12 Dec 2024 05:34 )             27.1     Urine Studies:  Urinalysis Basic - ( 12 Dec 2024 05:34 )    Color:  / Appearance:  / SG:  / pH:   Gluc: 133 mg/dL / Ketone:   / Bili:  / Urobili:    Blood:  / Protein:  / Nitrite:    Leuk Esterase:  / RBC:  / WBC    Sq Epi:  / Non Sq Epi:  / Bacteria:       Osmolality, Random Urine: 392 mosm/Kg (12-12 @ 05:45)  Sodium, Random Urine: 64 mmol/L (12-12 @ 05:45)  Osmolality, Random Urine: 605 mosm/Kg (12-11 @ 10:09)  Sodium, Random Urine: <5 mmol/L (12-11 @ 10:09)    RADIOLOGY & ADDITIONAL STUDIES:

## 2024-12-12 NOTE — PROGRESS NOTE ADULT - SUBJECTIVE AND OBJECTIVE BOX
KAELA DRAKE  89yFemale [id number]      Diagnosis:  S/p Right Hip IM nail POD#3    Patient is seen and evaluated at bedside with her daughter present. Patient admits to right calf pain that started overnight.   Pain to incision site is rated a 4/10. Denies numbness/tingling.,   PT OOB with PT yesterday. Na improved from 122 to 127 overnight. Hg increased 9.2 to 9.5 s/p 1 unit prbc on 12/11.   Pt denies Fever, Chest pain, SOB, dyspnea, paresthesias, N/V/D, abdominal pain, syncope, or pain anywhere else.       Vital Signs Last 24 Hrs  T(C): 36.6 (12 Dec 2024 05:15), Max: 36.7 (11 Dec 2024 19:48)  T(F): 97.8 (12 Dec 2024 05:15), Max: 98 (11 Dec 2024 19:48)  HR: 86 (12 Dec 2024 05:15) (71 - 86)  BP: 132/72 (12 Dec 2024 05:15) (120/66 - 134/60)  BP(mean): 85 (11 Dec 2024 19:48) (85 - 85)  ABP: --  ABP(mean): --  RR: 17 (12 Dec 2024 05:15) (17 - 18)  SpO2: 99% (12 Dec 2024 05:15) (95% - 99%)    O2 Parameters below as of 12 Dec 2024 05:15  Patient On (Oxygen Delivery Method): room air      I&O's Detail    11 Dec 2024 07:01  -  12 Dec 2024 07:00  --------------------------------------------------------  IN:  Total IN: 0 mL    OUT:    Indwelling Catheter - Urethral (mL): 2300 mL  Total OUT: 2300 mL    Total NET: -2300 mL                Physical Exam:  General: AAOx3, in NAD, resting comfortably in bed.  Right Hip: Dressing C/D/I. changed yesterday.  Skin is pink and warm. No erythema. SILT.  Wound with no drainage, healing well, no signs of active bleeding   Lower extremity:  +calf tenderness on right, NTTP to left., calves are soft. 2+pulses. NVI. 5/5 Strength of EHL/FHL/ADF/APF b/l.  Good capillary refill. Warm, well-perfused.                                                   9.5    6.63  )-----------( 100      ( 12 Dec 2024 05:34 )             27.1   12-12    127[L]  |  96  |  13  ----------------------------<  133[H]  4.6   |  26  |  0.50    Ca    7.9[L]      12 Dec 2024 05:34  Phos  2.5     12-12  Mg     1.8     12-12    TPro  5.2[L]  /  Alb  2.4[L]  /  TBili  0.7  /  DBili  x   /  AST  17  /  ALT  10  /  AlkPhos  49  12-12               Impression:   89yFemaleS/p Right Hip IM nail POD#3, hyponatremia, and acute blood loss anemia post-operative   Plan:  -  Pain management  -  Continue to trend H/h, would not recommend another unit at this time.   -  Duplex US ordered to r/o DVT  -  Hyponatremia management per medicine team  -  DVT prophylaxis with Lovenox and venodynes  -  Daily Physical Therapy:  WBAT on RLE with appropriate assistive device  -  Discharge planning: Rehab pending  -  Encouraged use of incentive spirometer  -  Case d/w Dr. Landers

## 2024-12-12 NOTE — DIETITIAN INITIAL EVALUATION ADULT - HEIGHT FOR BMI (FEET)
[de-identified] : 69 year old male presents s/p bilateral knee replacement. He is progressing well 6 months postop. Xrays were reviewed and the patient was reassured that their TKA components are in good position, with no signs of loosening or wear. Swelling is typical postop sand benign.  FU 6 months.  5

## 2024-12-12 NOTE — PROGRESS NOTE ADULT - PROBLEM SELECTOR PLAN 2
required vidal placement after PVR>400ml x 2  Cont Flomax  Consider TOV post bm  May need to discharge with vidal

## 2024-12-12 NOTE — DIETITIAN INITIAL EVALUATION ADULT - NUTRITION CONSULT
This patient has been assessed with a concern for Malnutrition and was treated during this hospitalization for the following Nutrition diagnosis/diagnoses:     -  03/01/2021: Severe protein-calorie malnutrition  
no

## 2024-12-12 NOTE — PROGRESS NOTE ADULT - SUBJECTIVE AND OBJECTIVE BOX
NP Note discussed with  Primary Attending    Patient is a 89y old  Female who presents with a chief complaint of Closed fracture of hip. (12 Dec 2024 09:53).  Seen at bedside, daughter in attendance.  Pt. appears comfortable with good pain control.    INTERVAL HPI/OVERNIGHT EVENTS: no new complaints    MEDICATIONS  (STANDING):  acetaminophen     Tablet .. 1000 milliGRAM(s) Oral every 8 hours  atorvastatin 20 milliGRAM(s) Oral at bedtime  bisacodyl Suppository 10 milliGRAM(s) Rectal daily  chlorhexidine 2% Cloths 1 Application(s) Topical once  enoxaparin Injectable 30 milliGRAM(s) SubCutaneous every 12 hours  lidocaine   4% Patch 1 Patch Transdermal daily  metoprolol succinate ER 25 milliGRAM(s) Oral daily  multivitamin 1 Tablet(s) Oral daily  polyethylene glycol 3350 17 Gram(s) Oral daily  povidone iodine 10% Nasal Swab 1 Application(s) Both Nostrils once  senna 2 Tablet(s) Oral at bedtime  sodium chloride 2 Gram(s) Oral two times a day  sodium chloride 1.5%. 500 milliLiter(s) (50 mL/Hr) IV Continuous <Continuous>  tamsulosin 0.4 milliGRAM(s) Oral at bedtime  valsartan 160 milliGRAM(s) Oral at bedtime    MEDICATIONS  (PRN):  aluminum hydroxide/magnesium hydroxide/simethicone Suspension 30 milliLiter(s) Oral every 4 hours PRN Dyspepsia  magnesium hydroxide Suspension 30 milliLiter(s) Oral daily PRN Constipation  melatonin 3 milliGRAM(s) Oral at bedtime PRN Insomnia  ondansetron Injectable 4 milliGRAM(s) IV Push every 8 hours PRN Nausea and/or Vomiting  ondansetron Injectable 4 milliGRAM(s) IV Push every 6 hours PRN Nausea and/or Vomiting  oxyCODONE    IR 2.5 milliGRAM(s) Oral every 4 hours PRN Moderate Pain (4 - 6)  oxyCODONE    IR 5 milliGRAM(s) Oral every 4 hours PRN Severe Pain (7 - 10)      __________________________________________________  REVIEW OF SYSTEMS:    CONSTITUTIONAL: No fever,   EYES: no acute visual disturbances  NECK: No pain or stiffness  RESPIRATORY: No cough; No shortness of breath  CARDIOVASCULAR: No chest pain, no palpitations  GASTROINTESTINAL: No pain. No nausea or vomiting; No diarrhea   NEUROLOGICAL: No headache or numbness, no tremors  MUSCULOSKELETAL: No joint pain, no muscle pain  GENITOURINARY: no dysuria, no frequency, no hesitancy  PSYCHIATRY: no depression , no anxiety  ALL OTHER  ROS negative        Vital Signs Last 24 Hrs  T(C): 36.6 (12 Dec 2024 05:15), Max: 36.7 (11 Dec 2024 19:48)  T(F): 97.8 (12 Dec 2024 05:15), Max: 98 (11 Dec 2024 19:48)  HR: 86 (12 Dec 2024 05:15) (71 - 86)  BP: 132/72 (12 Dec 2024 05:15) (132/72 - 134/60)  BP(mean): 85 (11 Dec 2024 19:48) (85 - 85)  RR: 17 (12 Dec 2024 05:15) (17 - 17)  SpO2: 99% (12 Dec 2024 05:15) (95% - 99%)    Parameters below as of 12 Dec 2024 05:15  Patient On (Oxygen Delivery Method): room air        ________________________________________________  PHYSICAL EXAM:  Well developed  GENERAL: NAD  HEENT: Normocephalic;  conjunctivae and sclerae clear; moist mucous membranes;   NECK : supple  CHEST/LUNG: Clear to auscultation bilaterally with good air entry   HEART: S1 S2  regular; no murmurs, gallops or rubs  ABDOMEN: Soft, Nontender, Nondistended; Bowel sounds present  EXTREMITIES: no cyanosis; no edema; no calf tenderness  SKIN: warm and dry; no rash  NERVOUS SYSTEM:  Awake and alert; Oriented  to place, person and time ; no new deficits    _________________________________________________  LABS:                        9.5    6.63  )-----------( 100      ( 12 Dec 2024 05:34 )             27.1     12-12    127[L]  |  96  |  13  ----------------------------<  133[H]  4.6   |  26  |  0.50    Ca    7.9[L]      12 Dec 2024 05:34  Phos  2.5     12-12  Mg     1.8     12-12    TPro  5.2[L]  /  Alb  2.4[L]  /  TBili  0.7  /  DBili  x   /  AST  17  /  ALT  10  /  AlkPhos  49  12-12      Urinalysis Basic - ( 12 Dec 2024 05:34 )    Color: x / Appearance: x / SG: x / pH: x  Gluc: 133 mg/dL / Ketone: x  / Bili: x / Urobili: x   Blood: x / Protein: x / Nitrite: x   Leuk Esterase: x / RBC: x / WBC x   Sq Epi: x / Non Sq Epi: x / Bacteria: x      CAPILLARY BLOOD GLUCOSE    RADIOLOGY & ADDITIONAL TESTS:    < from: US Duplex Venous Lower Ext Complete, Bilateral (12.12.24 @ 09:50) >    ACC: [id number] EXAM:  US DPLX LWR EXT VEINS COMPL BI   ORDERED BY: MARIA D DELONG     PROCEDURE DATE:  12/12/2024          INTERPRETATION:  CLINICAL INFORMATION: Right hip replacement    COMPARISON: None available.    TECHNIQUE: Duplex sonography of the BILATERAL LOWER extremity veins with   color and spectral Doppler, with and without compression.    FINDINGS:    RIGHT:  Normal compressibility of the RIGHT common femoral, femoral and popliteal   veins.  Doppler examination shows normal spontaneous and phasic flow.  No RIGHT calf vein thrombosis is detected.    LEFT:  Normal compressibility of the LEFT common femoral, femoral and popliteal   veins.  Doppler examination shows normal spontaneous and phasic flow.  No LEFT calf vein thrombosisis detected.    IMPRESSION:  No evidence of deep venous thrombosis in either lower extremity.    < end of copied text >      < from: Xray Femur 2 Views, Right (12.08.24 @ 16:16) >    ACC: [id number] EXAM:  XR KNEE AP LAT OBL 3 VIEWS RT   ORDERED BY:  WEI BRYSON     ACC: [id number] EXAM:  XR FEMUR 2 VIEWS RT   ORDERED BY:  WEI BRYSON     ACC: [id number] EXAM:  XR CHEST PORTABLE URGENT 1V   ORDERED BY:  WEI BRYSON     ACC: [id number] EXAM:  XR HIP WITH PELV 1V RT   ORDERED BY:  WEI BRYSON     *** ADDENDUM # 1 ***    ADDENDUM.    Right hip with pelvis, right femur, and right knee findings are as   follows.    Right hip with pelvis. 3 views.    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 There is mild   symmetric hip degeneration and arterial calcification.    There is an intertrochanteric fracture of the right hip with increased   angulation at the fracture site.    Right femur. 4 views. Lower femur are intact.    Right knee. 3 views. No effusion. Arterial calcifications. Diffuse   moderate degeneration. No fracture.    Intertrochanteric fracture right hip.    --- End of Report ---    < end of copied text >    < from: Xray Hip w/ Pelvis 1 View, Right (12.08.24 @ 16:16) >    ACC: [id number] EXAM:  XR KNEE AP LAT OBL 3 VIEWS RT   ORDERED BY:  WEI BRYSON     ACC: [id number] EXAM:  XR FEMUR 2 VIEWS RT   ORDERED BY:  WEI BRYSON     ACC: [id number] EXAM:  XR CHEST PORTABLE URGENT 1V   ORDERED BY:  WEI BRYSON     ACC: [id number] EXAM:  XR HIP WITH PELV 1V RT   ORDERED BY:  WEI BRYSON     *** ADDENDUM # 1 ***    ADDENDUM.    Right hip with pelvis, right femur, and right knee findings are as   follows.    Right hip with pelvis. 3 views.    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 There is mild   symmetric hip degeneration and arterial calcification.    There is an intertrochanteric fracture of the right hip with increased   angulation at the fracture site.    Right femur. 4 views. Lower femur are intact.    Right knee. 3 views. No effusion. Arterial calcifications. Diffuse   moderate degeneration. No fracture.    Intertrochanteric fracture right hip.    --- End of Report ---    *** END OF ADDENDUM # 1 ***      PROCEDURE DATE:  12/08/2024          INTERPRETATION:  AP semierect chest on December 8, 2024 at 3:43 PM.   Patient had trauma.    Elevated diaphragms crowds the chest.    Heart magnified by technique.    No definite acute finding or visible fracture.    Findings are similar to April 14, 2021.    IMPRESSION: No acute finding.    --- End of Report ---    < end of copied text >    Imaging Personally Reviewed:  YES/NO    Consultant(s) Notes Reviewed:   YES/ No    Care Discussed with Consultants :     Plan of care was discussed with patient and /or primary care giver; all questions and concerns were addressed and care was aligned with patient's wishes.

## 2024-12-12 NOTE — PROGRESS NOTE ADULT - ASSESSMENT
89 yo F, lives alone HHA  8hrs/6 days, ambulates with a walker, with PMH of HTN, Spinal stenosis,  RT Breast CA s/p lumpectomy, radiation and chemo, present with right hip pain s/p unwitnessed mechanical fall. Admitted for intertrochanteric fracture of right hip, followed by ortho, now s/p right hip IM nailing POD#1.  Hospital course c/b urinary retention requiring vidal placement on 12/10 night.  Hospital course further c/b worsening hyponatremia, nephro Dr. Sandoval consulted.    12/12-Pt. s/p NaHcl 2gm PO x1 and 1.5% NaCL @50cc/hr x 10hrs per nephro reccs  with appropriate improvement of serum Na 122-->127, monitoring continues.    Pt. s/p transfusion 1 unit PRBCs with Hgn 9.1-->9.5, HD stable with no s&s of bleeding.  Discharge to [facility] likely 12/13, CM following.

## 2024-12-12 NOTE — CHART NOTE - NSCHARTNOTEFT_GEN_A_CORE
Reviewed repeat bmp and CBC for further evaluation of hyponatremia and anemia.    serum Na 127-->124  K+-->5.7 (slightly hemolyzed)  Hgb 9.5-->8.8    Discussed results with nephro Dr. Sandoval.  Plan:  -Repeat BMP for evaluation of hyperkalemia  -D/C salt tabs  -Start IVF NaCl 1.5% @50cc/hr x 10hrs    f/u am bmp. CBC

## 2024-12-12 NOTE — DIETITIAN INITIAL EVALUATION ADULT - PROBLEM SELECTOR PLAN 5
Hx of HTN on Diovan 160mg, Metoprolol suc 25mg QD as per sure script  - c/w home med  with parameters.  - DASH diet  - MED REC

## 2024-12-12 NOTE — PROGRESS NOTE ADULT - ASSESSMENT
Patient is a 89yo Female with HTN on HCTZ, Spinal stenosis,  RT Breast CA s/p lumpectomy, RT/ chemo p/w Rt hip pain s/p mechanical fall. Pt a/w Rt intertrochanteric fracture s/p OR 12/9 with insertion of locking intramedullary bola into right hip. Nephrology consulted for hyponatremia    1. Hyponatremia- initial Urine Na <5, Urine Osm 650, Serum Osm 264; consistent with hypovolemia. Pt given 1.5% NaCl @ 50ml/hr x 10 hrs. Repeat serum Na improved to 127. However repeat Urine Na 64, Serum Osm 265, low uric acid, urine osm pending; consistent with SIADH; will start NaCl 2g PO bid. Check BMP @ 4pm. Will start glucerna to increase osmolar intake (declines ensure).   TSH wnl.   Check AM Cortisol.  Monitor serial BMP's and UO for overcorrection. Do not correct more than 8 meQ/24 hours. Monitor serum sodium.  2. Essential HTN-BP acceptable. c/w Valsartan. Continue to hold HCTZ due to hyponatremia; recc to hold on discharge due to risk of hyponatremia marguerite in the elderly. Monitor BP  3. Urinary retention- pt with vidal. c/w flomax. Recc good bowel regimen for constipation  4. Rt intertrochanteric fracture- s/p OR 12/9 with insertion of locking intramedullary bola into right hip. Avoid NSAIDs due to hyponatremia.     [facility] NEPHROLOGY  Yamil Liz M.D.  Uriel Arevalo D.O.  Domenica Sandoval M.D.  MD Marion Yun, MSN, ANP-C    Telephone: [phone]  Facsimile: [phone] [place] [address], [address]  [place] [address]   Patient is a 89yo Female with HTN on HCTZ, Spinal stenosis,  RT Breast CA s/p lumpectomy, RT/ chemo p/w Rt hip pain s/p mechanical fall. Pt a/w Rt intertrochanteric fracture s/p OR 12/9 with insertion of locking intramedullary bola into right hip. Nephrology consulted for hyponatremia    1. Hyponatremia- initial Urine Na <5, Urine Osm 650, Serum Osm 264; consistent with hypovolemia. Pt given 1.5% NaCl @ 50ml/hr x 10 hrs. Repeat serum Na improved to 127. However repeat Urine Na 64, Serum Osm 265, low uric acid, urine osm 392; consistent with SIADH; will start NaCl 2g PO bid. Check BMP @ 4pm. Will start glucerna to increase osmolar intake (declines ensure).   TSH wnl.   Check AM Cortisol.  Monitor serial BMP's and UO for overcorrection. Do not correct more than 8 meQ/24 hours. Monitor serum sodium.  2. Essential HTN-BP acceptable. c/w Valsartan. Continue to hold HCTZ due to hyponatremia; recc to hold on discharge due to risk of hyponatremia marguerite in the elderly. Monitor BP  3. Urinary retention- pt with vidal. c/w flomax. Recc good bowel regimen for constipation  4. Rt intertrochanteric fracture- s/p OR 12/9 with insertion of locking intramedullary bola into right hip. Avoid NSAIDs due to hyponatremia.     [facility] NEPHROLOGY  Yamil Liz M.D.  Uriel Arevalo D.O.  Domenica Sandoval M.D.  MD Marion Yun, MSN, ANP-C    Telephone: [phone]  Facsimile: [phone] [place] [address], [address]  [place] [address]

## 2024-12-12 NOTE — PROGRESS NOTE ADULT - PROBLEM SELECTOR PLAN 3
likely acute on chronic as pt's OP serum Na noted 130  Nephro Dr. Sandoval consulted for worsening hyponatremia 129-->130-->123-->122  s/p Sodium chloride salt tab 2gm x 1 per nephro  s/p NaCl 1.5% @50cc/hr x 10 hrs  Na improved to 127 (12/12)  serum osmolality low at 264  Urine osmolality NL at 605  Urine sodium low at <5  Home HCTZ on hold  Cont close bmp monitoring

## 2024-12-12 NOTE — DIETITIAN INITIAL EVALUATION ADULT - ORAL INTAKE PTA/DIET HISTORY
Attempted to visit pt at bedside, pt was not present, daughter was present to provide nutrition information. Reported no new food allergies or intolerances. Pt does not take any vitamins or supplements at home. Pt's intake was good PTA. Does not know UBW, however reported no recent significant weight changes.  Nutrition interview: No recent episodes of nausea, vomiting, diarrhea or constipation per daughter. Does not remember last BM, pt usually goes once every 2-3 days. Denies any chewing/swallowing difficulties. Intake is % per pt, pt does not enjoy food provided as much, pt eats food brought home occasionally. Food preferences explored and forwarded to dietary. Noted with hyponatremia (127).

## 2024-12-12 NOTE — DIETITIAN INITIAL EVALUATION ADULT - LITERATURE/VIDEOS GIVEN
Educated pt's daughter to continue encouraging pt to eat as able. Emphasized the importance of eating adequate protein/calorie through PO meal intake/PO supplement intake.

## 2024-12-12 NOTE — DIETITIAN INITIAL EVALUATION ADULT - PROBLEM SELECTOR PLAN 1
ICU Update Note:     Patient admitted overnight and is s/p PCI of the ostial to mid LAD. Doing well today. BP in the 160s systolic. Echo shows mildly reduced EF at 48% with hypokinesis of multiple areas of the myocardium. Will start Coreg.  Replace electrolytes to maintain potassium above 4 and magnesium above 2.  Continue ASA, Brillinta and statin.  Further initiation of goal-directed therapy per cardiology.  Will discontinue amiodarone infusion.  We will follow-up with cardiology, likely downgrade to cardiac telemetry over the next 24 hours.    I spent 30 minutes of additional critical care time with this patient.    Terra Camilo, DO   Critical Care Medicine     Present with right hip pain s/p unwitnessed mechanical fall.  RT HIP: Tenderness to palpation. lower extremity shortened in comparison to left hip.  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  Xray RT Femur:  Lower femur are intact.  Xray RT knee : No effusion. Arterial calcifications. Diffuse moderate degeneration. No fracture  s/p Morphine 2mg  - Orthopedic consulted by the ED  > OR 24  - Pain med: Tylenol, Morphine 2mg, Morphine 4prn (mild, moderate, severe pain)  - Fall precaution

## 2024-12-12 NOTE — PROGRESS NOTE ADULT - PROBLEM SELECTOR PLAN 1
Present with right hip pain s/p unwitnessed mechanical fall.  CXR: negative; There is lower lumbar degeneration and left lumbar curve  Xray RT Hip:  There is an intertrochanteric fracture of the right hip with increased  angulation at the fracture site.  Xray RT Femur:  Lower femur are intact.  Xray RT knee : No effusion. Arterial calcifications. Diffuse moderate degeneration. No fracture  Ortho following  Now s/p right hip IM nailing 12/9  Pain mgnt recc appreciated  PT recc PAVITHRA, CM following

## 2024-12-12 NOTE — DIETITIAN INITIAL EVALUATION ADULT - PROBLEM/PLAN-4
Due to the COVID-19 pandemic we are having to cancel all none emergent scans. Spoke with provider and they agree that this CT can wit until late. Call patient to inform them 3/31/220/jl   DISPLAY PLAN FREE TEXT

## 2024-12-12 NOTE — DIETITIAN INITIAL EVALUATION ADULT - PERTINENT MEDS FT
MEDICATIONS  (STANDING):  acetaminophen     Tablet .. 1000 milliGRAM(s) Oral every 8 hours  atorvastatin 20 milliGRAM(s) Oral at bedtime  bisacodyl Suppository 10 milliGRAM(s) Rectal daily  chlorhexidine 2% Cloths 1 Application(s) Topical once  enoxaparin Injectable 30 milliGRAM(s) SubCutaneous every 12 hours  lidocaine   4% Patch 1 Patch Transdermal daily  metoprolol succinate ER 25 milliGRAM(s) Oral daily  multivitamin 1 Tablet(s) Oral daily  polyethylene glycol 3350 17 Gram(s) Oral daily  povidone iodine 10% Nasal Swab 1 Application(s) Both Nostrils once  senna 2 Tablet(s) Oral at bedtime  sodium chloride 1.5%. 500 milliLiter(s) (50 mL/Hr) IV Continuous <Continuous>  tamsulosin 0.4 milliGRAM(s) Oral at bedtime  valsartan 160 milliGRAM(s) Oral at bedtime    MEDICATIONS  (PRN):  aluminum hydroxide/magnesium hydroxide/simethicone Suspension 30 milliLiter(s) Oral every 4 hours PRN Dyspepsia  magnesium hydroxide Suspension 30 milliLiter(s) Oral daily PRN Constipation  melatonin 3 milliGRAM(s) Oral at bedtime PRN Insomnia  ondansetron Injectable 4 milliGRAM(s) IV Push every 8 hours PRN Nausea and/or Vomiting  ondansetron Injectable 4 milliGRAM(s) IV Push every 6 hours PRN Nausea and/or Vomiting  oxyCODONE    IR 2.5 milliGRAM(s) Oral every 4 hours PRN Moderate Pain (4 - 6)  oxyCODONE    IR 5 milliGRAM(s) Oral every 4 hours PRN Severe Pain (7 - 10)

## 2024-12-12 NOTE — PROGRESS NOTE ADULT - PROBLEM SELECTOR PLAN 4
14.2-->12.7-->12.9-->13.0-->9.1  s/p Transfusion 1 unit PRBCs 12/11  Hgn today 9.5 (12/12)  No obvious s&s of bleeding, HD stable  f/u am CBC

## 2024-12-13 DIAGNOSIS — Z75.8 OTHER PROBLEMS RELATED TO MEDICAL FACILITIES AND OTHER HEALTH CARE: ICD-10-CM

## 2024-12-13 LAB
ANION GAP SERPL CALC-SCNC: 3 MMOL/L — LOW (ref 5–17)
ANION GAP SERPL CALC-SCNC: 4 MMOL/L — LOW (ref 5–17)
APPEARANCE UR: CLEAR — SIGNIFICANT CHANGE UP
BACTERIA # UR AUTO: ABNORMAL /HPF
BASOPHILS # BLD AUTO: 0.02 K/UL — SIGNIFICANT CHANGE UP (ref 0–0.2)
BASOPHILS NFR BLD AUTO: 0.3 % — SIGNIFICANT CHANGE UP (ref 0–2)
BILIRUB UR-MCNC: NEGATIVE — SIGNIFICANT CHANGE UP
BLD GP AB SCN SERPL QL: SIGNIFICANT CHANGE UP
BUN SERPL-MCNC: 10 MG/DL — SIGNIFICANT CHANGE UP (ref 7–18)
BUN SERPL-MCNC: 12 MG/DL — SIGNIFICANT CHANGE UP (ref 7–18)
CALCIUM SERPL-MCNC: 7.7 MG/DL — LOW (ref 8.4–10.5)
CALCIUM SERPL-MCNC: 8.2 MG/DL — LOW (ref 8.4–10.5)
CHLORIDE SERPL-SCNC: 101 MMOL/L — SIGNIFICANT CHANGE UP (ref 96–108)
CHLORIDE SERPL-SCNC: 98 MMOL/L — SIGNIFICANT CHANGE UP (ref 96–108)
CO2 SERPL-SCNC: 26 MMOL/L — SIGNIFICANT CHANGE UP (ref 22–31)
CO2 SERPL-SCNC: 28 MMOL/L — SIGNIFICANT CHANGE UP (ref 22–31)
COLOR SPEC: YELLOW — SIGNIFICANT CHANGE UP
COMMENT - URINE 2: SIGNIFICANT CHANGE UP
COMMENT - URINE: SIGNIFICANT CHANGE UP
CORTIS AM PEAK SERPL-MCNC: 13.2 UG/DL — SIGNIFICANT CHANGE UP (ref 6–18.4)
CREAT SERPL-MCNC: 0.44 MG/DL — LOW (ref 0.5–1.3)
CREAT SERPL-MCNC: 0.51 MG/DL — SIGNIFICANT CHANGE UP (ref 0.5–1.3)
DIFF PNL FLD: ABNORMAL
EGFR: 88 ML/MIN/1.73M2 — SIGNIFICANT CHANGE UP
EGFR: 91 ML/MIN/1.73M2 — SIGNIFICANT CHANGE UP
EOSINOPHIL # BLD AUTO: 0.09 K/UL — SIGNIFICANT CHANGE UP (ref 0–0.5)
EOSINOPHIL NFR BLD AUTO: 1.5 % — SIGNIFICANT CHANGE UP (ref 0–6)
GLUCOSE SERPL-MCNC: 127 MG/DL — HIGH (ref 70–99)
GLUCOSE SERPL-MCNC: 155 MG/DL — HIGH (ref 70–99)
GLUCOSE UR QL: NEGATIVE MG/DL — SIGNIFICANT CHANGE UP
GRAN CASTS # UR COMP ASSIST: PRESENT
HCT VFR BLD CALC: 23.4 % — LOW (ref 34.5–45)
HGB BLD-MCNC: 8.1 G/DL — LOW (ref 11.5–15.5)
IMM GRANULOCYTES NFR BLD AUTO: 0.7 % — SIGNIFICANT CHANGE UP (ref 0–0.9)
KETONES UR-MCNC: NEGATIVE MG/DL — SIGNIFICANT CHANGE UP
LEUKOCYTE ESTERASE UR-ACNC: ABNORMAL
LYMPHOCYTES # BLD AUTO: 1.62 K/UL — SIGNIFICANT CHANGE UP (ref 1–3.3)
LYMPHOCYTES # BLD AUTO: 27.3 % — SIGNIFICANT CHANGE UP (ref 13–44)
MCHC RBC-ENTMCNC: 29.1 PG — SIGNIFICANT CHANGE UP (ref 27–34)
MCHC RBC-ENTMCNC: 34.6 G/DL — SIGNIFICANT CHANGE UP (ref 32–36)
MCV RBC AUTO: 84.2 FL — SIGNIFICANT CHANGE UP (ref 80–100)
MONOCYTES # BLD AUTO: 0.78 K/UL — SIGNIFICANT CHANGE UP (ref 0–0.9)
MONOCYTES NFR BLD AUTO: 13.1 % — SIGNIFICANT CHANGE UP (ref 2–14)
NEUTROPHILS # BLD AUTO: 3.39 K/UL — SIGNIFICANT CHANGE UP (ref 1.8–7.4)
NEUTROPHILS NFR BLD AUTO: 57.1 % — SIGNIFICANT CHANGE UP (ref 43–77)
NITRITE UR-MCNC: NEGATIVE — SIGNIFICANT CHANGE UP
NRBC # BLD: 0 /100 WBCS — SIGNIFICANT CHANGE UP (ref 0–0)
OSMOLALITY SERPL: 270 MOSMOL/KG — LOW (ref 280–301)
OSMOLALITY UR: 438 MOS/KG — SIGNIFICANT CHANGE UP (ref 50–1200)
PH UR: 6.5 — SIGNIFICANT CHANGE UP (ref 5–8)
PLATELET # BLD AUTO: 108 K/UL — LOW (ref 150–400)
POTASSIUM SERPL-MCNC: 4.8 MMOL/L — SIGNIFICANT CHANGE UP (ref 3.5–5.3)
POTASSIUM SERPL-MCNC: 4.8 MMOL/L — SIGNIFICANT CHANGE UP (ref 3.5–5.3)
POTASSIUM SERPL-SCNC: 4.8 MMOL/L — SIGNIFICANT CHANGE UP (ref 3.5–5.3)
POTASSIUM SERPL-SCNC: 4.8 MMOL/L — SIGNIFICANT CHANGE UP (ref 3.5–5.3)
POTASSIUM UR-SCNC: 44 MMOL/L — SIGNIFICANT CHANGE UP
PROT UR-MCNC: ABNORMAL MG/DL
RBC # BLD: 2.78 M/UL — LOW (ref 3.8–5.2)
RBC # FLD: 13.8 % — SIGNIFICANT CHANGE UP (ref 10.3–14.5)
RBC CASTS # UR COMP ASSIST: 15 /HPF — HIGH (ref 0–4)
SODIUM SERPL-SCNC: 130 MMOL/L — LOW (ref 135–145)
SODIUM SERPL-SCNC: 130 MMOL/L — LOW (ref 135–145)
SODIUM UR-SCNC: 45 MMOL/L — SIGNIFICANT CHANGE UP
SP GR SPEC: 1.02 — SIGNIFICANT CHANGE UP (ref 1–1.03)
UROBILINOGEN FLD QL: 1 MG/DL — SIGNIFICANT CHANGE UP (ref 0.2–1)
WBC # BLD: 5.94 K/UL — SIGNIFICANT CHANGE UP (ref 3.8–10.5)
WBC # FLD AUTO: 5.94 K/UL — SIGNIFICANT CHANGE UP (ref 3.8–10.5)
WBC UR QL: 10 /HPF — HIGH (ref 0–5)

## 2024-12-13 PROCEDURE — 99232 SBSQ HOSP IP/OBS MODERATE 35: CPT

## 2024-12-13 RX ORDER — ACETAMINOPHEN 500MG 500 MG/1
1000 TABLET, COATED ORAL EVERY 8 HOURS
Refills: 0 | Status: DISCONTINUED | OUTPATIENT
Start: 2024-12-13 | End: 2024-12-14

## 2024-12-13 RX ORDER — SIMETHICONE 125 MG
80 CAPSULE ORAL ONCE
Refills: 0 | Status: COMPLETED | OUTPATIENT
Start: 2024-12-13 | End: 2024-12-13

## 2024-12-13 RX ADMIN — ENOXAPARIN SODIUM 30 MILLIGRAM(S): 30 INJECTION SUBCUTANEOUS at 20:22

## 2024-12-13 RX ADMIN — ACETAMINOPHEN 500MG 1000 MILLIGRAM(S): 500 TABLET, COATED ORAL at 05:00

## 2024-12-13 RX ADMIN — Medication 2 TABLET(S): at 21:26

## 2024-12-13 RX ADMIN — OXYCODONE HYDROCHLORIDE 5 MILLIGRAM(S): 30 TABLET ORAL at 23:30

## 2024-12-13 RX ADMIN — ACETAMINOPHEN 500MG 1000 MILLIGRAM(S): 500 TABLET, COATED ORAL at 14:52

## 2024-12-13 RX ADMIN — Medication 80 MILLIGRAM(S): at 15:12

## 2024-12-13 RX ADMIN — OXYCODONE HYDROCHLORIDE 5 MILLIGRAM(S): 30 TABLET ORAL at 22:46

## 2024-12-13 RX ADMIN — ACETAMINOPHEN 500MG 1000 MILLIGRAM(S): 500 TABLET, COATED ORAL at 05:46

## 2024-12-13 RX ADMIN — LIDOCAINE 1 PATCH: 40 CREAM TOPICAL at 23:03

## 2024-12-13 RX ADMIN — ACETAMINOPHEN 500MG 1000 MILLIGRAM(S): 500 TABLET, COATED ORAL at 21:26

## 2024-12-13 RX ADMIN — TAMSULOSIN HYDROCHLORIDE 0.4 MILLIGRAM(S): 0.4 CAPSULE ORAL at 21:26

## 2024-12-13 RX ADMIN — Medication 20 MILLIGRAM(S): at 21:26

## 2024-12-13 RX ADMIN — OXYCODONE HYDROCHLORIDE 5 MILLIGRAM(S): 30 TABLET ORAL at 15:02

## 2024-12-13 RX ADMIN — LIDOCAINE 1 PATCH: 40 CREAM TOPICAL at 19:30

## 2024-12-13 RX ADMIN — ACETAMINOPHEN 500MG 1000 MILLIGRAM(S): 500 TABLET, COATED ORAL at 16:33

## 2024-12-13 RX ADMIN — LIDOCAINE 1 PATCH: 40 CREAM TOPICAL at 12:10

## 2024-12-13 RX ADMIN — ENOXAPARIN SODIUM 30 MILLIGRAM(S): 30 INJECTION SUBCUTANEOUS at 08:38

## 2024-12-13 RX ADMIN — Medication 1 TABLET(S): at 12:10

## 2024-12-13 RX ADMIN — CHLORHEXIDINE GLUCONATE 1 APPLICATION(S): 1.2 RINSE ORAL at 12:11

## 2024-12-13 RX ADMIN — OXYCODONE HYDROCHLORIDE 5 MILLIGRAM(S): 30 TABLET ORAL at 08:58

## 2024-12-13 RX ADMIN — OXYCODONE HYDROCHLORIDE 5 MILLIGRAM(S): 30 TABLET ORAL at 12:41

## 2024-12-13 NOTE — PROGRESS NOTE ADULT - PROBLEM SELECTOR PLAN 1
Pt presented after a mechanical fall with acute right hip pain which is somatic and neuropathic in nature due to right hip fracture. Pt is s/p 12/9 insertion of locking intramedullary bola into right hip POD #4. Pending rehab. High risk medications reviewed. Avoid polypharmacy. Avoid IV opioids. Avoid NSAIDs and benzodiazepines. Non-pharmacological sleep aides initiated. Non-opioid medications and non-pharmacological pain management measures initiated.   Opioid pain recommendations    - Continue Oxycodone 2.5/5 mg PO q 4 hours PRN moderate-severe pain. Monitor for sedation/ respiratory depression.   Non-opioid pain recommendations   - Continue Acetaminophen 1 gram PO q 8 hours for 4 days. Monitor LFTs  - Continue Lidoderm 4% patch daily.   Bowel Regimen  - Miralax 17G PO daily  - Senna 2 tablets at bedtime for constipation  - Dulcolax 10 mg WA daily PRN  Mild pain 1 - 3  - Non-pharmacological pain treatment recommendations  - Warm/ Cool packs PRN   - Repositioning, guided imagery, relaxation, distraction.  - Physical therapy OOB if no contraindications   Recommendations discussed with primary team and RN.

## 2024-12-13 NOTE — PROGRESS NOTE ADULT - PROBLEM SELECTOR PLAN 7
Hx of HTN on Diovan 160mg, Metoprolol suc 25mg QD as per sure script  - c/w home med  with parameters.  - DASH diet  - MED REC completed

## 2024-12-13 NOTE — PROGRESS NOTE ADULT - PROBLEM SELECTOR PLAN 8
DVT ppx:  Heparin today and hold tomorrow for surgery

## 2024-12-13 NOTE — PROGRESS NOTE ADULT - NS ATTEND AMEND GEN_ALL_CORE FT
89 yo F (lives alone HHA 8hrs/6 days, ambulates with a walker) w/ PMHx of HTN, Spinal stenosis, R Breast CA s/p lumpectomy, RT and chemo, p/w R hip pain s/p unwitnessed mechanical fall now admitted for surgery of intertrochanteric Fx.     Patient was seen and examined daughter at bedside. All questions answered, reprots the morphine helps with pain but makes her sleepy. Doing ok otherwise understands will have to be NPO until after surgery today with Ortho.      Exam:  NAD, lying in bed speaking full sentences   AAOx3, no focal deficit  RRR S1S2 WNL  CTABL  Abd soft, nontender, BS present   Right hip tenderness, no bruise or local swelling   BLLE No edema or calf tenderness, ADP palpable in BLLE   Limited exam of RLE    Vitals, Labs, and Imaging reviewed.                         12.7   6.89  )-----------( 128      ( 09 Dec 2024 06:52 )             37.0   129[L]  |  95[L]  |  15  ----------------------------( 123[H]     12-09 @ 06:52  3.9   |  28  |  0.56    Ca: 8.6   Phos: 3.4   M.2    TPro: 5.8[L] / Alb: 3.0[L] /  TBili 0.7 / DBili x  /  AST 14 /  ALT 15 /  AlkPhos  64  Xray: IT Fx R Hip     A/P:  #Mechanical fall  #Right hip fracture   #HTN  #Spinal stenosis   #Left macular degeneration   #Hx of L Breast ca with lumpectomy, radiation and chemotherapy- on remission for 20 years     - RCRI score 0, 3.9% 30 day risk of MACE. Patient is low to moderate risk for moderate risk procedure. Patient reports having adverse reaction to Novocain (unknown) and during her lumpectomy it took long time for her to recovery. As per patient, her father  of adverse reaction to anesthesia.   - EKG NSR, Patient is medically optimized for orthopedic procedure today, labs wnl   - Cont home dose Metoprolol 25 and valsartan 160 perioperatively, monitor renal function, hold if SILVIA   - hold rest of home meds for now: HCTZ 12.5, Amlodipine 5 (pt uses PRN), atorvastatin 20  - Last HSQ on , now held for OR, still NPO  - Low dose Morphine for pain management with bowel regimen  - Monitor for urinary retention and constipation   - trend sodium, fever curve, vitals post op Na today 129   - Plan of care was discussed with patient and daughter at bedside; all questions and concerns were addressed and care was aligned with patient's wishes.
89 yo F (lives alone HHA 8hrs/6 days, ambulates with a walker) w/ PMHx of HTN, Spinal stenosis, R Breast CA s/p lumpectomy, RT and chemo, p/w R hip pain s/p unwitnessed mechanical fall now admitted for surgery of intertrochanteric Fx.     Patient was evaluated, c/o feeling thirsty. Her BP dropped this am    PE as above     Labs reviewed- cbc, bmp     A/P:  #Intertrochanteric fx S/p Right Hip Hemiarthroplasty 12/09   #Hyponatremia 2/2 SIADH   #Acute urinary retention   #HTN  #Spinal stenosis   #R breast ca     Plan:  -Hb dropped slowly, will give 1 PRC unit today.   -Sodium improved to 130.   -C/w Potter, passed TOV   -BP stable, c/w valsartan , metoprolol   -PT recommended PAVITHRA   -Ortho following.
89 yo F (lives alone HHA 8hrs/6 days, ambulates with a walker) w/ PMHx of HTN, Spinal stenosis, R Breast CA s/p lumpectomy, RT and chemo, p/w R hip pain s/p unwitnessed mechanical fall now admitted for surgery of intertrochanteric Fx.     Patient was evaluated, pain is better. She feels nauseous. Sodium levels improved this am. Hb stable     PE as above     Labs reviewed- cbc, bmp    A/P:  #Intertrochanteric fx S/p Right Hip Hemiarthroplasty 12/09   #Hyponatremia 2/2 SIADH   #Acute urinary retention   #HTN  #Spinal stenosis   #R breast ca     Plan:  -Hb stable s/p 1 PRBC on 12/11. Monitor cbc daily   -Sodium improved to 127. Latest urine studies consistent w/ SIADH. Appreciate nephro recs, advised to start salt tabs BID. Follow repeat BMP   -C/w pain meds   -C/w Potter, passed TOV   -BP stable, c/w valsartan , metoprolol   -PT recommended PAVITHRA   -Ortho following.
89 yo F (lives alone HHA 8hrs/6 days, ambulates with a walker) w/ PMHx of HTN, Spinal stenosis, R Breast CA s/p lumpectomy, RT and chemo, p/w R hip pain s/p unwitnessed mechanical fall now admitted for surgery of intertrochanteric Fx.     patient was evaluated, still w/ some pain. She was given TOV, still has not voided     PE as above     Labs reviewed- cbc, bmp    A/P:  #Intertrochanteric fx S/p Right Hip Hemiarthroplasty 12/09   #Acute urinary retention   #HTN  #Spinal stenosis   #R breast ca     Plan:  -C/w pain meds   -Follow TOV , place Potter cathter if fails   -BP stable, c/w valsartan , metoprolol   -PT recommended PAVITHRA   -Ortho following   -
This is a late entry, patient was evaluated this afternoon     89 yo F (lives alone HHA 8hrs/6 days, ambulates with a walker) w/ PMHx of HTN, Spinal stenosis, R Breast CA s/p lumpectomy, RT and chemo, p/w R hip pain s/p unwitnessed mechanical fall now admitted for surgery of intertrochanteric Fx.     patient was evaluated, still w/ some pain. She was given TOV, still has not voided     PE as above     Labs reviewed- cbc, bmp    A/P:  #Intertrochanteric fx S/p Right Hip Hemiarthroplasty 12/09   #Acute urinary retention   #HTN  #Spinal stenosis   #R breast ca     Plan:  _patient's Hb dropped from 13 to 9, likely post op loss. Would Tx one unit   -Sodium dropped to 123, send urine osm, sodium, serum osm. Pt is s/p salt tab this am, repeat studies showed further drop in sodium 122. Nephrology following, advise 1.5% saline   -C/w pain meds   -Failed TOV, vidal replaced   -BP stable, c/w valsartan , metoprolol   -PT recommended PAVITHRA   -Ortho following

## 2024-12-13 NOTE — PROGRESS NOTE ADULT - ASSESSMENT
89 yo F, lives alone HHA  8hrs/6 days, ambulates with a walker, with PMH of HTN, Spinal stenosis,  RT Breast CA s/p lumpectomy, radiation and chemo, present with right hip pain s/p unwitnessed mechanical fall. Admitted for intertrochanteric fracture of right hip, followed by ortho, now s/p right hip IM nailing POD#1.  Hospital course c/b urinary retention requiring vidal placement on 12/10 night.  Hospital course further c/b worsening hyponatremia, nephro Dr. Sandoval consulted.    12/12-Pt. s/p NaHcl 2gm PO x1 and 1.5% NaCL @50cc/hr x 10hrs per nephro reccs  with appropriate improvement of serum Na 122-->127, monitoring continues.    Pt. s/p transfusion 1 unit PRBCs with Hgn 9.1-->9.5, HD stable with no s&s of bleeding.  Discharge to [facility] likely 12/13, CM following.           89 yo F, lives alone HHA  8hrs/6 days, ambulates with a walker, with PMH of HTN, Spinal stenosis,  RT Breast CA s/p lumpectomy, radiation and chemo, present with right hip pain s/p unwitnessed mechanical fall. Admitted for intertrochanteric fracture of right hip, followed by ortho, now s/p right hip IM nailing POD#1.  Hospital course c/b urinary retention requiring vidal placement on 12/10 night, failed TOV, likely  d/c with vidal   Hospital course further c/b worsening hyponatremia, nephro Dr. Sandoval consulted.    12/12-Pt. s/p NaHcl 2gm PO x1 and 1.5% NaCL @50cc/hr x 10hrs per nephro reccs  with appropriate improvement of serum Na 122-->127, monitoring continues.    Pt. s/p transfusion 1 unit PRBCs with Hgn 9.1-->9.5, HD stable with no s&s of bleeding.   12/13 - Hg further dropped, 1 unit of PRBC was given without complication. Pain is following,  Explained  her pain meds are PRN , continue to monitor pain level, Sodium level improved to 130 today, d/michele salt tab, will check BMP at 5-6pm today and in AM   Pt is DC for PAVITHRA and new Auth will be requested.

## 2024-12-13 NOTE — PROGRESS NOTE ADULT - ASSESSMENT
Patient is a 89yo Female with HTN on HCTZ, Spinal stenosis,  RT Breast CA s/p lumpectomy, RT/ chemo p/w Rt hip pain s/p mechanical fall. Pt a/w Rt intertrochanteric fracture s/p OR 12/9 with insertion of locking intramedullary bola into right hip. Nephrology consulted for hyponatremia    1. Hyponatremia- initial Urine Na <5, Urine Osm 650, Serum Osm 264; consistent with hypovolemia. Pt given 1.5% NaCl @ 50ml/hr x 10 hrs. Repeat serum Na improved to 127. However repeat Urine Na 64, Serum Osm 265, low uric acid, urine osm 392; consistent with SIADH. Improvement in Na+ after additional dosing of 1.5% NaCl (50 ml/hr for 10 hours). Repeat urine studies. If still consistent with SIADH, then recommend continuing Glucerna and adding a moderate fluid restriction.    Avoid medications and infusions in hypotonic fluids.    TSH wnl.   Check AM Cortisol.  Monitor serial BMP's and UO for overcorrection. Do not correct more than 8 meQ/24 hours. Monitor serum sodium.    2. Essential HTN-BP acceptable. c/w Valsartan. Continue to hold HCTZ due to hyponatremia; recc to hold on discharge due to risk of hyponatremia marguerite in the elderly. Monitor BP    3. Urinary retention- pt with vidal. c/w flomax. Recc good bowel regimen for constipation.    4. Rt intertrochanteric fracture- s/p OR 12/9 with insertion of locking intramedullary bola into right hip. Avoid NSAIDs due to hyponatremia.     [facility] NEPHROLOGY  Yamil Liz M.D.  Uriel Arevalo D.O.  Domneica Sandoval M.D.  MD Marion Yun, MSN, ANP-C    Telephone: [phone]  Facsimile: [phone] [place] [address], [address]  [place] [address]

## 2024-12-13 NOTE — PROGRESS NOTE ADULT - SUBJECTIVE AND OBJECTIVE BOX
KAELA [id number]  89yFemale    Diagnosis: 89yFemale S/p Right Hip IM Nailing POD#4    Patient was seen and evaluated at bedside. Patient with no acute complaints.   Pain is  well controlled.    Denies CP/SOB, dyspnea, paresthesias, N/V/D, palpitations.     Vital Signs Last 24 Hrs  T(C): 36.7 (13 Dec 2024 05:15), Max: 36.7 (12 Dec 2024 14:07)  T(F): 98.1 (13 Dec 2024 05:15), Max: 98.1 (12 Dec 2024 14:07)  HR: 88 (13 Dec 2024 05:15) (71 - 88)  BP: 96/59 (13 Dec 2024 05:15) (96/59 - 136/73)  BP(mean): 71 (13 Dec 2024 05:15) (71 - 94)  RR: 17 (13 Dec 2024 05:15) (17 - 18)  SpO2: 96% (13 Dec 2024 05:15) (96% - 98%)    Parameters below as of 13 Dec 2024 05:15  Patient On (Oxygen Delivery Method): room air      I&O's Detail    12 Dec 2024 07:01  -  13 Dec 2024 07:00  --------------------------------------------------------  IN:  Total IN: 0 mL    OUT:    Indwelling Catheter - Urethral (mL): 1600 mL  Total OUT: 1600 mL    Total NET: -1600 mL          Physical Exam:  General: AAOx3, NAD, resting comfortably in bed.  Right Hip:  Dressing is C/D/I. fluid filled Blister noted at the anterior aspect of proximal incision.. Fluid Blister noted to posterior aspect of distal incision Skin is pink and warm. Staples intact. No erythema. SILT.  Wound with no drainage, healing well.   Lower extremity:  No calf tenderness, calves are soft. 2+pulses. NVI. 5/5 Strength of EHL/FHL/ADF/APF b/l.  Good capillary refill. Warm, well-perfused.                           8.1    5.94  )-----------( 108      ( 13 Dec 2024 05:15 )             23.4     12-13    130[L]  |  101  |  10  ----------------------------<  127[H]  4.8   |  26  |  0.44[L]    Ca    7.7[L]      13 Dec 2024 05:15  Phos  2.5     12-12  Mg     1.8     12-12    TPro  5.2[L]  /  Alb  2.4[L]  /  TBili  0.7  /  DBili  x   /  AST  17  /  ALT  10  /  AlkPhos  49  12-12      Impression:  89yFemale S/p Right Hip IM Nailing POD#4 acute post-op blood loss anemia / hyponatremia  Plan:  -  Pain management  -  DVT prophylaxis with lovenox and venodynes  -  Daily Physical Therapy:  WBAT of the Right lower extremity with appropriate assistive device   -  Discharge planning:  Rehab   - Continue with care as per medicince regarding hyponatremia  - Remove dressing. If blisters pop dry dressing will be placed   -  Transfuse 1 unit pRBC today  -  Continue with Post-op Antibiotics x 24hrs  - Encouraged the use of incentive spirometry   -  Case d/w DR. Landers

## 2024-12-13 NOTE — PROGRESS NOTE ADULT - SUBJECTIVE AND OBJECTIVE BOX
Source of information: KAELA DRAKE, Chart review  Patient language: [place]an  : n/a    HPI:  89 yo F, lives alone HHA  8hrs/6 days, ambulates with a walker, with PMH of HTN, Spinal stenosis,  RT Breast CA s/p lumpectomy, radiation and chemo, present with right hip pain s/p unwitnessed mechanical fall. Patient reports that she walked into her bathroom without a walker when she accidentally slipped and fell on her right side. Patient denies any head trauma, LOC, headache, vision change, chest pain, numbness/tingling sensation. Patient reports since the fall she has been  unable to ambulates.    In the ED,   v/s: 177/80, 73, 97.4F, 96% RA  Labs: WBC 12, Hgb 14  EK BPM, NSR  CXR: negative; There is lower lumbar degeneration and left lumbar curve  Xray RT Hip:  There is an intertrochanteric fracture of the right hip with increased  angulation at the fracture site.  Xray RT Femur:  Lower femur are intact.  Xray RT knee : No effusion. Arterial calcifications. Diffuse moderate degeneration. No fracture  s/p Morphine 2mg  s/p bolus   (08 Dec 2024 18:02)    Pt is admitted s/p mechanical fall with an intertrochanteric fracture of the right hip. Pt is s/p 12/ insertion of locking intramedullary bola into right hip. POD#4. Pain service consulted for management of right hip pain. Pt seen and examined at bedside this morning with daughter present in room. Pt found sitting up in bed, awake, alert and oriented x 3, speech clear, no acute distress noted. Pt is [place]an speaking, daughter Elayne assisting with translation. Pt reports no pain on right hip at this time while in bed 0/10 ; although c/o left heel/ankle pain 6/10 which is elevated on towel. SCALE USED: (1-10 VNRS). RN made aware and instructed to give pain meds as ordered. Pt describes right hip pain when present as aching and radiates to the right thigh. Pain is alleviated by pain medication and exacerbated by movement. Pt reports left heel pain as aching. Pt tolerating PO diet. Denies lethargy, chest pain, SOB, nausea, vomiting, constipation. Reports last BM . Patient stated goal for pain control: to be able to take deep breaths, get out of bed to chair and ambulate with tolerable pain control. Per IStop review pt takes Tramadol for pain at home. Pt/daughter instructed to use pain meds as needed for pain control.    PAST MEDICAL & SURGICAL HISTORY:  HTN (hypertension)    Breast cancer    Spinal stenosis    H/O breast surgery    FAMILY HISTORY:  No pertinent family history in first degree relatives    Social History:  Lives Alone, HHA  8hrs/ 6 days (08 Dec 2024 18:02)   [x]Denies ETOH use, illicit drug use, and smoking     Allergies    penicillin (Unknown)  Novocain (Unknown)    Intolerances    MEDICATIONS  (STANDING):  acetaminophen     Tablet .. 1000 milliGRAM(s) Oral every 8 hours  atorvastatin 20 milliGRAM(s) Oral at bedtime  bisacodyl Suppository 10 milliGRAM(s) Rectal daily  chlorhexidine 2% Cloths 1 Application(s) Topical once  enoxaparin Injectable 30 milliGRAM(s) SubCutaneous every 12 hours  lidocaine   4% Patch 1 Patch Transdermal daily  metoprolol succinate ER 25 milliGRAM(s) Oral daily  multivitamin 1 Tablet(s) Oral daily  polyethylene glycol 3350 17 Gram(s) Oral daily  povidone iodine 10% Nasal Swab 1 Application(s) Both Nostrils once  senna 2 Tablet(s) Oral at bedtime  sodium chloride 1.5%. 500 milliLiter(s) (50 mL/Hr) IV Continuous <Continuous>  tamsulosin 0.4 milliGRAM(s) Oral at bedtime    MEDICATIONS  (PRN):  aluminum hydroxide/magnesium hydroxide/simethicone Suspension 30 milliLiter(s) Oral every 4 hours PRN Dyspepsia  magnesium hydroxide Suspension 30 milliLiter(s) Oral daily PRN Constipation  melatonin 3 milliGRAM(s) Oral at bedtime PRN Insomnia  ondansetron Injectable 4 milliGRAM(s) IV Push every 6 hours PRN Nausea and/or Vomiting  ondansetron Injectable 4 milliGRAM(s) IV Push every 8 hours PRN Nausea and/or Vomiting  oxyCODONE    IR 2.5 milliGRAM(s) Oral every 4 hours PRN Moderate Pain (4 - 6)  oxyCODONE    IR 5 milliGRAM(s) Oral every 4 hours PRN Severe Pain (7 - 10)    Vital Signs Last 24 Hrs  T(C): 36.7 (13 Dec 2024 05:15), Max: 36.7 (12 Dec 2024 14:07)  T(F): 98.1 (13 Dec 2024 05:15), Max: 98.1 (12 Dec 2024 14:07)  HR: 88 (13 Dec 2024 05:15) (71 - 88)  BP: 96/59 (13 Dec 2024 05:15) (96/59 - 136/73)  BP(mean): 71 (13 Dec 2024 05:15) (71 - 94)  RR: 17 (13 Dec 2024 05:15) (17 - 18)  SpO2: 96% (13 Dec 2024 05:15) (96% - 98%)    Parameters below as of 13 Dec 2024 05:15  Patient On (Oxygen Delivery Method): room air    LABS: Reviewed                        8.1    5.94  )-----------( 108      ( 13 Dec 2024 05:15 )             23.4     12-13    130[L]  |  101  |  10  ----------------------------<  127[H]  4.8   |  26  |  0.44[L]    Ca    7.7[L]      13 Dec 2024 05:15  Phos  2.5     12-12  Mg     1.8     12-12    TPro  5.2[L]  /  Alb  2.4[L]  /  TBili  0.7  /  DBili  x   /  AST  17  /  ALT  10  /  AlkPhos  49  12-12    LIVER FUNCTIONS - ( 12 Dec 2024 05:34 )  Alb: 2.4 g/dL / Pro: 5.2 g/dL / ALK PHOS: 49 U/L / ALT: 10 U/L DA / AST: 17 U/L / GGT: x           Urinalysis Basic - ( 13 Dec 2024 10:34 )    Color: Yellow / Appearance: Clear / S.019 / pH: x  Gluc: x / Ketone: Negative mg/dL  / Bili: Negative / Urobili: 1.0 mg/dL   Blood: x / Protein: Trace mg/dL / Nitrite: Negative   Leuk Esterase: Moderate / RBC: 15 /HPF / WBC 10 /HPF   Sq Epi: x / Non Sq Epi: x / Bacteria: Few /HPF    CAPILLARY BLOOD GLUCOSE    Radiology: Reviewed  ACC: [id number] EXAM:  XR KNEE AP LAT OBL 3 VIEWS RT   ORDERED BY:  WEI BRYSON     ACC: [id number] EXAM:  XR FEMUR 2 VIEWS RT   ORDERED BY:  WEI BRYSON     ACC: [id number] EXAM:  XR CHEST PORTABLE URGENT 1V   ORDERED BY:  WEI BRYSON     ACC: [id number] EXAM:  XR HIP WITH PELV 1V RT   ORDERED BY:  WEI BRYSON     *** ADDENDUM # 1 ***    ADDENDUM.    Right hip with pelvis, right femur, and right knee findings are as   follows.    Right hip with pelvis. 3 views.    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 There is mild   symmetric hip degeneration and arterial calcification.    There is an intertrochanteric fracture of the right hip with increased   angulation at the fracture site.    Right femur. 4 views. Lower femur are intact.    Right knee. 3 views. No effusion. Arterial calcifications. Diffuse   moderate degeneration. No fracture.    Intertrochanteric fracture right hip.    --- End of Report ---    *** END OF ADDENDUM # 1 ***    PROCEDURE DATE:  2024      INTERPRETATION:  AP semierect chest on 2024 at 3:43 PM.   Patient had trauma.    Elevated diaphragms crowds the chest.    Heart magnified by technique.    No definite acute finding or visible fracture.    Findings are similar to 2021.    IMPRESSION: No acute finding.    --- End of Report ---    ***Please see the addendum at the top of this report. It may contain   additional important information or changes.****    MARIA D JURADO MD; Attending Radiologist  This document has been electronically signed. Dec  8 2024  4:19PM  1st Addendum: MARIA D JURADO MD; Attending Radiologist  The first addendum was electronically signed on: Dec  8 2024  6:29PM.    ORT Score -   Family Hx of substance abuse	Female	      Male  Alcohol 	                                           1                     3  Illegal drugs	                                   2                     3  Rx drugs                                           4 	                  4  Personal Hx of substance abuse		  Alcohol 	                                          3	                  3  Illegal drugs                                     4	                  4  Rx drugs                                            5 	                  5  Age between 16- 45 years	           1                     1  hx preadolescent sexual abuse	   3 	                  0  Psychological disease		  ADD, OCD, bipolar, schizophrenia   2	          2  Depression                                           1 	          1  Total: 0    a score of 3 or lower indicates low risk for opioid abuse		  a score of 4-7 indicates moderate risk for opioid abuse		  a score of 8 or higher indicates high risk for opioid abuse    REVIEW OF SYSTEMS:  CONSTITUTIONAL: No fever or fatigue  HEENT:  No difficulty hearing, no change in vision  NECK: No pain or stiffness  RESPIRATORY: No cough, wheezing, chills or hemoptysis; No shortness of breath  CARDIOVASCULAR: No chest pain, palpitations, dizziness, or leg swelling  GASTROINTESTINAL: No loss of appetite, decreased PO intake. No abdominal or epigastric pain. No nausea, vomiting; No diarrhea or constipation.   GENITOURINARY: No dysuria, frequency, hematuria, retention or incontinence  MUSCULOSKELETAL: + intermittent right hip and c/o heel/ankle pain, + right hip/thigh swelling, no upper or left lower motor strength weakness, no saddle anesthesia, bowel/bladder incontinence, + fall, + occasional urinary retention based on her hx  NEURO: No headaches, No numbness/tingling b/l LE, No weakness  ENDOCRINE: No polyuria, polydipsia, heat or cold intolerance; No hair loss  PSYCHIATRIC: No depression, anxiety or difficulty sleeping    PHYSICAL EXAM:  GENERAL:  Alert & Oriented X 3, cooperative, NAD, Good concentration. Speech is clear. [place]an speaking  RESPIRATORY: Respirations even and unlabored. Clear to auscultation bilaterally; No rales, rhonchi, wheezing, or rubs  CARDIOVASCULAR: Normal S1/S2, regular rate and rhythm; No murmurs, rubs, or gallops. No JVD.   GASTROINTESTINAL:  Soft, Nontender, Nondistended; Bowel sounds present  GENITOURINARY: vidal catheter in place, with clear yellow urine draining.   PERIPHERAL VASCULAR:  Extremities warm with mild to moderate right hip swelling POD #4. + Peripheral Pulses, No cyanosis, No calf tenderness  MUSCULOSKELETAL: Motor Strength 5/5 B/L upper and left lower extremities; moves all extremities equally against gravity except RLE s/p R hip surgery on 12/9 POD #4, + tenderness on palpation of right hip, left heel/ankle  SKIN: Warm, dry, intact. Right hip noted ecchymotic and swollen, steri strips in place, CDI     Risk factors associated with adverse outcomes related to opioid treatment  [ ]  Concurrent benzodiazepine use  [ ]  History/ Active substance use or alcohol use disorder  [ ] Psychiatric co-morbidity  [ ] Sleep apnea  [ ] COPD  [ ] BMI> 35  [ ] Liver dysfunction  [ ] Renal dysfunction  [ ] CHF  [ ] Smoker  [x]  Age > 60 years    [x]  [place]  Reviewed and Copied to Chart. See below.    Plan of care and goal oriented pain management treatment options were discussed with patient and /or primary care giver; all questions and concerns were addressed and care was aligned with patient's wishes.    Educated patient on goal oriented pain management treatment options     24 @ 12:03

## 2024-12-13 NOTE — PROGRESS NOTE ADULT - SUBJECTIVE AND OBJECTIVE BOX
[facility] NEPHROLOGY- PROGRESS NOTE    Patient is a 89yo Female with HTN on HCTZ, Spinal stenosis,  RT Breast CA s/p lumpectomy, RT/ chemo p/w Rt hip pain s/p mechanical fall. Pt a/w Rt intertrochanteric fracture s/p OR 12/9 with insertion of locking intramedullary bola into right hip. Nephrology consulted for hyponatremia    Hospital Medications: Medications reviewed.    REVIEW OF SYSTEMS:  CONSTITUTIONAL: No fevers or chills  RESPIRATORY: No shortness of breath  CARDIOVASCULAR: No chest pain.  GASTROINTESTINAL: No vomiting, diarrhea or abdominal pain. Also denies nausea and constipation.  VASCULAR: No bilateral lower extremity edema. +b/l leg pain post PT    VITALS:  T(F): 98.1 (12-13-24 @ 05:15), Max: 98.1 (12-12-24 @ 14:07)  HR: 88 (12-13-24 @ 05:15)  BP: 96/59 (12-13-24 @ 05:15)  RR: 17 (12-13-24 @ 05:15)  SpO2: 96% (12-13-24 @ 05:15)  Wt(kg): --    12-12 @ 07:01  -  12-13 @ 07:00  --------------------------------------------------------  IN: 0 mL / OUT: 1600 mL / NET: -1600 mL        PHYSICAL EXAM:  Constitutional: NAD  HEENT: anicteric sclera,   Respiratory: CTAB, no wheezes, rales or rhonchi  Cardiovascular: S1, S2, RRR  Gastrointestinal: BS+, soft, NT/ND  : +vidal.  Extremities: trace RLE edema; no LLE edema       LABS:  12-13    Na+ trend: 130 <--, 124 <--, 127 <--, 122 <--, 123 <--, 130 <--, 129 <--    130[L]  |  101  |  10  ----------------------------<  127[H]  4.8   |  26  |  0.44[L]    Ca    7.7[L]      13 Dec 2024 05:15  Phos  2.5     12-12  Mg     1.8     12-12    TPro  5.2[L]  /  Alb  2.4[L]  /  TBili  0.7  /  DBili      /  AST  17  /  ALT  10  /  AlkPhos  49  12-12    Creatinine Trend: 0.44 <--, 0.67 <--, 0.50 <--, 0.65 <--, 0.61 <--, 0.75 <--, 0.64 <--, 0.56 <--, 0.68 <--                        8.1    5.94  )-----------( 108      ( 13 Dec 2024 05:15 )             23.4     Urine Studies:  Urinalysis Basic - ( 13 Dec 2024 05:15 )    Color:  / Appearance:  / SG:  / pH:   Gluc: 127 mg/dL / Ketone:   / Bili:  / Urobili:    Blood:  / Protein:  / Nitrite:    Leuk Esterase:  / RBC:  / WBC    Sq Epi:  / Non Sq Epi:  / Bacteria:       Osmolality, Random Urine: 392 mosm/Kg (12-12 @ 05:45)  Sodium, Random Urine: 64 mmol/L (12-12 @ 05:45)  Osmolality, Random Urine: 605 mosm/Kg (12-11 @ 10:09)  Sodium, Random Urine: <5 mmol/L (12-11 @ 10:09)        RADIOLOGY & ADDITIONAL STUDIES:

## 2024-12-13 NOTE — PROGRESS NOTE ADULT - PROBLEM SELECTOR PLAN 1
Present with right hip pain s/p unwitnessed mechanical fall.  CXR: negative; There is lower lumbar degeneration and left lumbar curve  Xray RT Hip:  There is an intertrochanteric fracture of the right hip with increased  angulation at the fracture site.  Xray RT Femur:  Lower femur are intact.  Xray RT knee : No effusion. Arterial calcifications. Diffuse moderate degeneration. No fracture  Ortho following  Now s/p right hip IM nailing 12/9  Pain mgnt recc appreciated  PT recc PAVITHRA, CM following Present with right hip pain s/p unwitnessed mechanical fall.  CXR: negative; There is lower lumbar degeneration and left lumbar curve  Xray RT Hip:  There is an intertrochanteric fracture of the right hip with increased  angulation at the fracture site.  Xray RT Femur:  Lower femur are intact.  Xray RT knee : No effusion. Arterial calcifications. Diffuse moderate degeneration. No fracture  Ortho following  Now s/p right hip IM nailing 12/9  Pain mgnt recc appreciated - reexplained pain meds are PRN, need to encourage to take meds when needed   PT recc PAVITHRA, CM following

## 2024-12-13 NOTE — PROGRESS NOTE ADULT - PROBLEM SELECTOR PLAN 9
plan was d/c to PAVITHRA if her electrolytes and H/H are stable  H/H 8.,4 today, sp 1 unit of PRBC   need to monitor her CBC and BMP for hyponatremia  Auth  today - CM will request new auth and likely d/c PAVITHRA on Monday if pt is stable  Ortho- cleared pt, Lovenox will be dced on discharge, transition to ASA

## 2024-12-13 NOTE — PROGRESS NOTE ADULT - PROBLEM SELECTOR PLAN 4
14.2-->12.7-->12.9-->13.0-->9.1  s/p Transfusion 1 unit PRBCs 12/11  Hgn today 9.5 (12/12)  No obvious s&s of bleeding, HD stable  f/u am CBC 14.2-->12.7-->12.9-->13.0-->9.1  s/p Transfusion 1 unit PRBCs 12/11  Hgn today 9.5 (12/12)  H/H dropped again today 12.13   1 unit of PRBC was given without complication  No obvious s&s of bleeding, HD stable  f/u am CBC

## 2024-12-13 NOTE — PROGRESS NOTE ADULT - ASSESSMENT
Search Terms: KAELA SEBASTIAN, 08/30/1932Search Date: 12/10/2024 10:51:30 AM  The Drug Utilization Report below displays all of the controlled substance prescriptions, if any, that your patient has filled in the last twelve months. The information displayed on this report is compiled from pharmacy submissions to the Department, and accurately reflects the information as submitted by the pharmacies.    This report was requested by: Mariann Gooden | Reference #: [id number]    You have not added a JORGE number. Keeping your JORGE number(s) up to date on the My JORGE # tab will enable the separation of your prescriptions from others in the search results.    Practitioner Count: 1  Pharmacy Count: 1  Current Opioid Prescriptions: 0  Current Benzodiazepine Prescriptions: 0  Current Stimulant Prescriptions: 0      Patient Demographic Information (PDI)       PDI	First Name	Last Name	Birth Date	Gender	Street Address	[facility] Code  A	Kaela Sebastian	08/30/1932	Female	[id number]	[facility]	[id number]    Prescription Information      PDI Filter:    PDI	Current Rx	Drug Type	Rx Written	Rx Dispensed	Drug	Quantity	Days Supply	Prescriber Name	Prescriber JORGE #	Payment Method	Dispenser  A	N	O	10/09/2024	10/09/2024	tramadol hcl 50 mg tablet	30	30	Demetri Cordova MD	[id number]	Insurance	[facility]  A	N	O	08/29/2023	12/12/2023	tramadol hcl 50 mg tablet	23	23	Demetri Cordova MD	[id number]	Insurance	[facility]    * - Details of Drug Type : O = Opioid, B = Benzodiazepine, S = Stimulant    * - Drugs marked with an asterisk are compound drugs. If the compound drug is made up of more than one controlled substance, then each controlled substance will be a separate row in the table.

## 2024-12-13 NOTE — PROGRESS NOTE ADULT - PROBLEM SELECTOR PLAN 3
likely acute on chronic as pt's OP serum Na noted 130  Nephro Dr. Sandoval consulted for worsening hyponatremia 129-->130-->123-->122  s/p Sodium chloride salt tab 2gm x 1 per nephro  s/p NaCl 1.5% @50cc/hr x 10 hrs  Na improved to 127 (12/12)  serum osmolality low at 264  Urine osmolality NL at 605  Urine sodium low at <5  Home HCTZ on hold  Cont close bmp monitoring likely acute on chronic as pt's OP serum Na noted 130  Nephro Dr. Sandoval consulted for worsening hyponatremia 129-->130-->123-->122  s/p Sodium chloride salt tab 2gm x 1 per nephro  s/p NaCl 1.5% @50cc/hr x 10 hrs  Na improved to 127 (12/12)  serum osmolality low at 264  Urine osmolality NL at 605  Urine sodium low at <5  Home HCTZ on hold  Cont close bmp monitoring - improving to 130 with interventions which recs by nephro   however, as per daughter 130is is pts baseline for past few years

## 2024-12-13 NOTE — PROGRESS NOTE ADULT - PROBLEM SELECTOR PLAN 6
Hx of Spinal stenosis  CXR: negative; There is lower lumbar degeneration and left lumbar curve
DVT ppx:  Heparin today and hold tomorrow for surgery
Hx of Spinal stenosis  CXR: negative; There is lower lumbar degeneration and left lumbar curve
Hx of Spinal stenosis  CXR: negative; There is lower lumbar degeneration and left lumbar curve
DVT ppx:  SCD prior to surgery (resume heparin after)

## 2024-12-13 NOTE — PROGRESS NOTE ADULT - SUBJECTIVE AND OBJECTIVE BOX
NP Note discussed with  Primary Attending    Patient is a 89y old  Female who presents with a chief complaint of Closed fracture of hip. (13 Dec 2024 09:34)      INTERVAL HPI/OVERNIGHT EVENTS: no new complaints    MEDICATIONS  (STANDING):  acetaminophen     Tablet .. 1000 milliGRAM(s) Oral every 8 hours  atorvastatin 20 milliGRAM(s) Oral at bedtime  bisacodyl Suppository 10 milliGRAM(s) Rectal daily  enoxaparin Injectable 30 milliGRAM(s) SubCutaneous every 12 hours  lidocaine   4% Patch 1 Patch Transdermal daily  metoprolol succinate ER 25 milliGRAM(s) Oral daily  multivitamin 1 Tablet(s) Oral daily  polyethylene glycol 3350 17 Gram(s) Oral daily  povidone iodine 10% Nasal Swab 1 Application(s) Both Nostrils once  senna 2 Tablet(s) Oral at bedtime  sodium chloride 1.5%. 500 milliLiter(s) (50 mL/Hr) IV Continuous <Continuous>  tamsulosin 0.4 milliGRAM(s) Oral at bedtime    MEDICATIONS  (PRN):  aluminum hydroxide/magnesium hydroxide/simethicone Suspension 30 milliLiter(s) Oral every 4 hours PRN Dyspepsia  magnesium hydroxide Suspension 30 milliLiter(s) Oral daily PRN Constipation  melatonin 3 milliGRAM(s) Oral at bedtime PRN Insomnia  ondansetron Injectable 4 milliGRAM(s) IV Push every 8 hours PRN Nausea and/or Vomiting  ondansetron Injectable 4 milliGRAM(s) IV Push every 6 hours PRN Nausea and/or Vomiting  oxyCODONE    IR 2.5 milliGRAM(s) Oral every 4 hours PRN Moderate Pain (4 - 6)  oxyCODONE    IR 5 milliGRAM(s) Oral every 4 hours PRN Severe Pain (7 - 10)      __________________________________________________  REVIEW OF SYSTEMS:    CONSTITUTIONAL: No fever,   EYES: no acute visual disturbances  NECK: No pain or stiffness  RESPIRATORY: No cough; No shortness of breath  CARDIOVASCULAR: No chest pain, no palpitations  GASTROINTESTINAL: No pain. No nausea or vomiting; No diarrhea   NEUROLOGICAL: No headache or numbness, no tremors  MUSCULOSKELETAL: No joint pain, no muscle pain  GENITOURINARY: no dysuria, no frequency, no hesitancy  PSYCHIATRY: no depression , no anxiety  ALL OTHER  ROS negative        Vital Signs Last 24 Hrs  T(C): 36.7 (13 Dec 2024 05:15), Max: 36.7 (12 Dec 2024 14:07)  T(F): 98.1 (13 Dec 2024 05:15), Max: 98.1 (12 Dec 2024 14:07)  HR: 88 (13 Dec 2024 05:15) (71 - 88)  BP: 96/59 (13 Dec 2024 05:15) (96/59 - 136/73)  BP(mean): 71 (13 Dec 2024 05:15) (71 - 94)  RR: 17 (13 Dec 2024 05:15) (17 - 18)  SpO2: 96% (13 Dec 2024 05:15) (96% - 98%)    Parameters below as of 13 Dec 2024 05:15  Patient On (Oxygen Delivery Method): room air        ________________________________________________  PHYSICAL EXAM:  GENERAL: NAD  HEENT: Normocephalic;  conjunctivae and sclerae clear; moist mucous membranes;   NECK : supple  CHEST/LUNG: Clear to auscultation bilaterally with good air entry   HEART: S1 S2  regular; no murmurs, gallops or rubs  ABDOMEN: Soft, Nontender, Nondistended; Bowel sounds present  EXTREMITIES: no cyanosis; no edema; no calf tenderness  SKIN: warm and dry; no rash  NERVOUS SYSTEM:  Awake and alert; Oriented  to place, person and time ; no new deficits    _________________________________________________  LABS:                        8.1    5.94  )-----------( 108      ( 13 Dec 2024 05:15 )             23.4     12-13    130[L]  |  101  |  10  ----------------------------<  127[H]  4.8   |  26  |  0.44[L]    Ca    7.7[L]      13 Dec 2024 05:15  Phos  2.5     12-12  Mg     1.8     12-12    TPro  5.2[L]  /  Alb  2.4[L]  /  TBili  0.7  /  DBili  x   /  AST  17  /  ALT  10  /  AlkPhos  49  12-12      Urinalysis Basic - ( 13 Dec 2024 10:34 )    Color: Yellow / Appearance: Clear / S.019 / pH: x  Gluc: x / Ketone: Negative mg/dL  / Bili: Negative / Urobili: 1.0 mg/dL   Blood: x / Protein: Trace mg/dL / Nitrite: Negative   Leuk Esterase: Moderate / RBC: 15 /HPF / WBC 10 /HPF   Sq Epi: x / Non Sq Epi: x / Bacteria: Few /HPF      CAPILLARY BLOOD GLUCOSE            RADIOLOGY & ADDITIONAL TESTS:    Imaging Personally Reviewed:  YES/NO    Consultant(s) Notes Reviewed:   YES/ No    Care Discussed with Consultants :     Plan of care was discussed with patient and /or primary care giver; all questions and concerns were addressed and care was aligned with patient's wishes.     NP Note discussed with  Primary Attending    Patient is a 89y old  Female who presents with a chief complaint of Closed fracture of hip. (13 Dec 2024 09:34)      INTERVAL HPI/OVERNIGHT EVENTS: no new complaints    MEDICATIONS  (STANDING):  acetaminophen     Tablet .. 1000 milliGRAM(s) Oral every 8 hours  atorvastatin 20 milliGRAM(s) Oral at bedtime  bisacodyl Suppository 10 milliGRAM(s) Rectal daily  enoxaparin Injectable 30 milliGRAM(s) SubCutaneous every 12 hours  lidocaine   4% Patch 1 Patch Transdermal daily  metoprolol succinate ER 25 milliGRAM(s) Oral daily  multivitamin 1 Tablet(s) Oral daily  polyethylene glycol 3350 17 Gram(s) Oral daily  povidone iodine 10% Nasal Swab 1 Application(s) Both Nostrils once  senna 2 Tablet(s) Oral at bedtime  sodium chloride 1.5%. 500 milliLiter(s) (50 mL/Hr) IV Continuous <Continuous>  tamsulosin 0.4 milliGRAM(s) Oral at bedtime    MEDICATIONS  (PRN):  aluminum hydroxide/magnesium hydroxide/simethicone Suspension 30 milliLiter(s) Oral every 4 hours PRN Dyspepsia  magnesium hydroxide Suspension 30 milliLiter(s) Oral daily PRN Constipation  melatonin 3 milliGRAM(s) Oral at bedtime PRN Insomnia  ondansetron Injectable 4 milliGRAM(s) IV Push every 8 hours PRN Nausea and/or Vomiting  ondansetron Injectable 4 milliGRAM(s) IV Push every 6 hours PRN Nausea and/or Vomiting  oxyCODONE    IR 2.5 milliGRAM(s) Oral every 4 hours PRN Moderate Pain (4 - 6)  oxyCODONE    IR 5 milliGRAM(s) Oral every 4 hours PRN Severe Pain (7 - 10)      __________________________________________________  REVIEW OF SYSTEMS:    CONSTITUTIONAL: No fever,   EYES: no acute visual disturbances  NECK: No pain or stiffness  RESPIRATORY: No cough; No shortness of breath  CARDIOVASCULAR: No chest pain, no palpitations  GASTROINTESTINAL: No pain. No nausea or vomiting; No diarrhea   NEUROLOGICAL: No headache or numbness, no tremors  MUSCULOSKELETAL: pain occasionally   GENITOURINARY: no dysuria, no frequency, no hesitancy  PSYCHIATRY: no depression , no anxiety  ALL OTHER  ROS negative        Vital Signs Last 24 Hrs  T(C): 36.7 (13 Dec 2024 05:15), Max: 36.7 (12 Dec 2024 14:07)  T(F): 98.1 (13 Dec 2024 05:15), Max: 98.1 (12 Dec 2024 14:07)  HR: 88 (13 Dec 2024 05:15) (71 - 88)  BP: 96/59 (13 Dec 2024 05:15) (96/59 - 136/73)  BP(mean): 71 (13 Dec 2024 05:15) (71 - 94)  RR: 17 (13 Dec 2024 05:15) (17 - 18)  SpO2: 96% (13 Dec 2024 05:15) (96% - 98%)    Parameters below as of 13 Dec 2024 05:15  Patient On (Oxygen Delivery Method): room air        ________________________________________________  PHYSICAL EXAM:  GENERAL: NAD  HEENT: Normocephalic;  conjunctivae and sclerae clear; moist mucous membranes;   NECK : supple  CHEST/LUNG: Clear to auscultation bilaterally with good air entry   HEART: S1 S2  regular; no murmurs, gallops or rubs  ABDOMEN: Soft, Nontender, Nondistended; Bowel sounds present  EXTREMITIES:  defer the physical exam due to pain   SKIN: warm and dry; no rash  NERVOUS SYSTEM:  Awake and alert; Oriented  to place, person and time ; no new deficits   + Potter catheter   _________________________________________________  LABS:                        8.1    5.94  )-----------( 108      ( 13 Dec 2024 05:15 )             23.4     12-13    130[L]  |  101  |  10  ----------------------------<  127[H]  4.8   |  26  |  0.44[L]    Ca    7.7[L]      13 Dec 2024 05:15  Phos  2.5     12-12  Mg     1.8     12-12    TPro  5.2[L]  /  Alb  2.4[L]  /  TBili  0.7  /  DBili  x   /  AST  17  /  ALT  10  /  AlkPhos  49  12-12      Urinalysis Basic - ( 13 Dec 2024 10:34 )    Color: Yellow / Appearance: Clear / S.019 / pH: x  Gluc: x / Ketone: Negative mg/dL  / Bili: Negative / Urobili: 1.0 mg/dL   Blood: x / Protein: Trace mg/dL / Nitrite: Negative   Leuk Esterase: Moderate / RBC: 15 /HPF / WBC 10 /HPF   Sq Epi: x / Non Sq Epi: x / Bacteria: Few /HPF      CAPILLARY BLOOD GLUCOSE            RADIOLOGY & ADDITIONAL TESTS:  < from: US Duplex Venous Lower Ext Complete, Bilateral (24 @ 09:50) >    ACC: [id number] EXAM:  US DPLX LWR EXT VEINS COMPL BI   ORDERED BY: MARIA D DELONG     PROCEDURE DATE:  2024          INTERPRETATION:  CLINICAL INFORMATION: Right hip replacement    COMPARISON: None available.    TECHNIQUE: Duplex sonography of the BILATERAL LOWER extremity veins with   color and spectral Doppler, with and without compression.    FINDINGS:    RIGHT:  Normal compressibility of the RIGHT common femoral, femoral and popliteal   veins.  Doppler examination shows normal spontaneous and phasic flow.  No RIGHT calf vein thrombosis is detected.    LEFT:  Normal compressibility of the LEFT common femoral, femoral and popliteal   veins.  Doppler examination shows normal spontaneous and phasic flow.  No LEFT calf vein thrombosisis detected.    IMPRESSION:  No evidence of deep venous thrombosis in either lower extremity.            --- End of Report ---            MARY RUSS MD; Attending Radiologist  This document has been electronically signed. Dec 12 2024 10:10AM    < end of copied text >  < from: Xray Femur 2 Views, Right (24 @ 16:16) >    ACC: [id number] EXAM:  XR KNEE AP LAT OBL 3 VIEWS RT   ORDERED BY:  WEI BRYSON     ACC: [id number] EXAM:  XR FEMUR 2 VIEWS RT   ORDERED BY:  WEI BRYSON     ACC: [id number] EXAM:  XR CHEST PORTABLE URGENT 1V   ORDERED BY:  WEI BRYSON     ACC: [id number] EXAM:  XR HIP WITH PELV 1V RT   ORDERED BY:  WEI BRYSON     *** ADDENDUM # 1 ***    ADDENDUM.    Right hip with pelvis, right femur, and right knee findings are as   follows.    Right hip with pelvis. 3 views.    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 There is mild   symmetric hip degeneration and arterial calcification.    There is an intertrochanteric fracture of the right hip with increased   angulation at the fracture site.    Right femur. 4 views. Lower femur are intact.    Right knee. 3 views. No effusion. Arterial calcifications. Diffuse   moderate degeneration. No fracture.    Intertrochanteric fracture right hip.    --- End of Report ---    *** END OF ADDENDUM # 1 ***      PROCEDURE DATE:  2024          INTERPRETATION:  AP semierect chest on 2024 at 3:43 PM.   Patient had trauma.    Elevated diaphragms crowds the chest.    Heart magnified by technique.    No definite acute finding or visible fracture.    Findings are similar to 2021.    IMPRESSION: No acute finding.    --- End of Report ---    ***Please see the addendum at the top of this report. It may contain   additional important information or changes.****        MARIA D JURADO MD; Attending Radiologist  This document has been electronically signed. Dec  8 2024  4:19PM  1st Addendum: MARIA D JURADO MD; Attending Radiologist  The first addendum was electronically signed on: Dec  8 2024  6:29PM.    < end of copied text >    Imaging Personally Reviewed:  YES/    Consultant(s) Notes Reviewed:   YES/     Care Discussed with Consultants : nephrology     Plan of care was discussed with patient and /or primary care giver; all questions and concerns were addressed and care was aligned with patient's wishes.

## 2024-12-14 LAB
ANION GAP SERPL CALC-SCNC: 4 MMOL/L — LOW (ref 5–17)
BASOPHILS # BLD AUTO: 0.03 K/UL — SIGNIFICANT CHANGE UP (ref 0–0.2)
BASOPHILS NFR BLD AUTO: 0.4 % — SIGNIFICANT CHANGE UP (ref 0–2)
BUN SERPL-MCNC: 15 MG/DL — SIGNIFICANT CHANGE UP (ref 7–18)
CALCIUM SERPL-MCNC: 8.3 MG/DL — LOW (ref 8.4–10.5)
CHLORIDE SERPL-SCNC: 100 MMOL/L — SIGNIFICANT CHANGE UP (ref 96–108)
CO2 SERPL-SCNC: 26 MMOL/L — SIGNIFICANT CHANGE UP (ref 22–31)
CREAT SERPL-MCNC: 0.51 MG/DL — SIGNIFICANT CHANGE UP (ref 0.5–1.3)
EGFR: 88 ML/MIN/1.73M2 — SIGNIFICANT CHANGE UP
EOSINOPHIL # BLD AUTO: 0.15 K/UL — SIGNIFICANT CHANGE UP (ref 0–0.5)
EOSINOPHIL NFR BLD AUTO: 2.1 % — SIGNIFICANT CHANGE UP (ref 0–6)
GLUCOSE SERPL-MCNC: 128 MG/DL — HIGH (ref 70–99)
HCT VFR BLD CALC: 26.4 % — LOW (ref 34.5–45)
HGB BLD-MCNC: 9 G/DL — LOW (ref 11.5–15.5)
IMM GRANULOCYTES NFR BLD AUTO: 1.4 % — HIGH (ref 0–0.9)
LYMPHOCYTES # BLD AUTO: 1.86 K/UL — SIGNIFICANT CHANGE UP (ref 1–3.3)
LYMPHOCYTES # BLD AUTO: 26.5 % — SIGNIFICANT CHANGE UP (ref 13–44)
MCHC RBC-ENTMCNC: 29.6 PG — SIGNIFICANT CHANGE UP (ref 27–34)
MCHC RBC-ENTMCNC: 34.1 G/DL — SIGNIFICANT CHANGE UP (ref 32–36)
MCV RBC AUTO: 86.8 FL — SIGNIFICANT CHANGE UP (ref 80–100)
MONOCYTES # BLD AUTO: 0.9 K/UL — SIGNIFICANT CHANGE UP (ref 0–0.9)
MONOCYTES NFR BLD AUTO: 12.8 % — SIGNIFICANT CHANGE UP (ref 2–14)
NEUTROPHILS # BLD AUTO: 3.97 K/UL — SIGNIFICANT CHANGE UP (ref 1.8–7.4)
NEUTROPHILS NFR BLD AUTO: 56.8 % — SIGNIFICANT CHANGE UP (ref 43–77)
NRBC # BLD: 0 /100 WBCS — SIGNIFICANT CHANGE UP (ref 0–0)
PLATELET # BLD AUTO: 124 K/UL — LOW (ref 150–400)
POTASSIUM SERPL-MCNC: 4.9 MMOL/L — SIGNIFICANT CHANGE UP (ref 3.5–5.3)
POTASSIUM SERPL-SCNC: 4.9 MMOL/L — SIGNIFICANT CHANGE UP (ref 3.5–5.3)
RBC # BLD: 3.04 M/UL — LOW (ref 3.8–5.2)
RBC # FLD: 13.9 % — SIGNIFICANT CHANGE UP (ref 10.3–14.5)
SODIUM SERPL-SCNC: 130 MMOL/L — LOW (ref 135–145)
WBC # BLD: 7.01 K/UL — SIGNIFICANT CHANGE UP (ref 3.8–10.5)
WBC # FLD AUTO: 7.01 K/UL — SIGNIFICANT CHANGE UP (ref 3.8–10.5)

## 2024-12-14 PROCEDURE — 74018 RADEX ABDOMEN 1 VIEW: CPT | Mod: 26

## 2024-12-14 PROCEDURE — 99231 SBSQ HOSP IP/OBS SF/LOW 25: CPT

## 2024-12-14 PROCEDURE — 99232 SBSQ HOSP IP/OBS MODERATE 35: CPT

## 2024-12-14 PROCEDURE — 74177 CT ABD & PELVIS W/CONTRAST: CPT | Mod: 26

## 2024-12-14 PROCEDURE — 99222 1ST HOSP IP/OBS MODERATE 55: CPT

## 2024-12-14 RX ORDER — IOHEXOL 300 MG/ML
30 INJECTION, SOLUTION INTRAVENOUS ONCE
Refills: 0 | Status: COMPLETED | OUTPATIENT
Start: 2024-12-14 | End: 2024-12-14

## 2024-12-14 RX ORDER — IOHEXOL 300 MG/ML
30 INJECTION, SOLUTION INTRAVENOUS ONCE
Refills: 0 | Status: DISCONTINUED | OUTPATIENT
Start: 2024-12-14 | End: 2024-12-14

## 2024-12-14 RX ORDER — ACETAMINOPHEN 500MG 500 MG/1
1000 TABLET, COATED ORAL ONCE
Refills: 0 | Status: COMPLETED | OUTPATIENT
Start: 2024-12-14 | End: 2024-12-14

## 2024-12-14 RX ORDER — SODIUM CHLORIDE 9 MG/ML
1 INJECTION, SOLUTION INTRAMUSCULAR; INTRAVENOUS; SUBCUTANEOUS
Refills: 0 | Status: DISCONTINUED | OUTPATIENT
Start: 2024-12-14 | End: 2024-12-16

## 2024-12-14 RX ADMIN — ACETAMINOPHEN 500MG 1000 MILLIGRAM(S): 500 TABLET, COATED ORAL at 22:11

## 2024-12-14 RX ADMIN — Medication 20 MILLIGRAM(S): at 21:21

## 2024-12-14 RX ADMIN — ACETAMINOPHEN 500MG 400 MILLIGRAM(S): 500 TABLET, COATED ORAL at 21:11

## 2024-12-14 RX ADMIN — OXYCODONE HYDROCHLORIDE 5 MILLIGRAM(S): 30 TABLET ORAL at 06:12

## 2024-12-14 RX ADMIN — ACETAMINOPHEN 500MG 1000 MILLIGRAM(S): 500 TABLET, COATED ORAL at 15:54

## 2024-12-14 RX ADMIN — ACETAMINOPHEN 500MG 1000 MILLIGRAM(S): 500 TABLET, COATED ORAL at 06:12

## 2024-12-14 RX ADMIN — LIDOCAINE 1 PATCH: 40 CREAM TOPICAL at 19:05

## 2024-12-14 RX ADMIN — ACETAMINOPHEN 500MG 1000 MILLIGRAM(S): 500 TABLET, COATED ORAL at 14:54

## 2024-12-14 RX ADMIN — TAMSULOSIN HYDROCHLORIDE 0.4 MILLIGRAM(S): 0.4 CAPSULE ORAL at 21:21

## 2024-12-14 RX ADMIN — OXYCODONE HYDROCHLORIDE 5 MILLIGRAM(S): 30 TABLET ORAL at 18:46

## 2024-12-14 RX ADMIN — Medication 30 MILLILITER(S): at 06:32

## 2024-12-14 RX ADMIN — Medication 10 MILLIGRAM(S): at 12:11

## 2024-12-14 RX ADMIN — IOHEXOL 30 MILLILITER(S): 300 INJECTION, SOLUTION INTRAVENOUS at 20:43

## 2024-12-14 RX ADMIN — ACETAMINOPHEN 500MG 1000 MILLIGRAM(S): 500 TABLET, COATED ORAL at 07:12

## 2024-12-14 RX ADMIN — OXYCODONE HYDROCHLORIDE 5 MILLIGRAM(S): 30 TABLET ORAL at 19:46

## 2024-12-14 RX ADMIN — ENOXAPARIN SODIUM 30 MILLIGRAM(S): 30 INJECTION SUBCUTANEOUS at 21:11

## 2024-12-14 RX ADMIN — OXYCODONE HYDROCHLORIDE 5 MILLIGRAM(S): 30 TABLET ORAL at 07:12

## 2024-12-14 RX ADMIN — Medication 30 MILLILITER(S): at 12:10

## 2024-12-14 RX ADMIN — LIDOCAINE 1 PATCH: 40 CREAM TOPICAL at 12:12

## 2024-12-14 RX ADMIN — ONDANSETRON HYDROCHLORIDE 4 MILLIGRAM(S): 4 TABLET, FILM COATED ORAL at 17:16

## 2024-12-14 RX ADMIN — SODIUM CHLORIDE 1 GRAM(S): 9 INJECTION, SOLUTION INTRAMUSCULAR; INTRAVENOUS; SUBCUTANEOUS at 17:16

## 2024-12-14 RX ADMIN — Medication 30 MILLILITER(S): at 18:46

## 2024-12-14 RX ADMIN — ENOXAPARIN SODIUM 30 MILLIGRAM(S): 30 INJECTION SUBCUTANEOUS at 09:23

## 2024-12-14 RX ADMIN — Medication 2 TABLET(S): at 21:21

## 2024-12-14 RX ADMIN — OXYCODONE HYDROCHLORIDE 5 MILLIGRAM(S): 30 TABLET ORAL at 13:10

## 2024-12-14 RX ADMIN — POLYETHYLENE GLYCOL 3350 17 GRAM(S): 17 POWDER, FOR SOLUTION ORAL at 12:11

## 2024-12-14 RX ADMIN — Medication 1 TABLET(S): at 12:10

## 2024-12-14 RX ADMIN — OXYCODONE HYDROCHLORIDE 5 MILLIGRAM(S): 30 TABLET ORAL at 12:10

## 2024-12-14 NOTE — CONSULT NOTE ADULT - SUBJECTIVE AND OBJECTIVE BOX
HPI:  89 yo F, lives alone [facility]HA  8hrs/6 days, ambulates with a walker, with PMH of HTN, Spinal stenosis,  RT Breast CA s/p lumpectomy, radiation and chemo, present with right hip pain s/p unwitnessed mechanical fall. Patient reports that she walked into her bathroom without a walker when she accidentally slipped and fell on her right side. Patient denies any head trauma, LOC, headache, vision change, chest pain, numbness/tingling sensation. Patient reports since the fall she has been  unable to ambulates.    Surgery consulted for constipation with resultant dilated colon  had been on oxycodone s/p hip surgery  pt with small bm this am  has received miralax, enemas    no n/v    < from: Xray Abdomen 1 View PORTABLE -Urgent (Xray Abdomen 1 View PORTABLE -Urgent .) (12.14.24 @ 18:36) >  FINDINGS/  IMPRESSION: There is increased colonic stool burden throughout the colon   with marked diffuse colonic gaseous distention most pronounced in the   ascending and splenic flexure regions. Findings compatible witha colonic   megacolon with constipation. Limited for the assessment of   pneumoperitoneum due to the absence of an upright view. No large soft   tissue masses or pathologic calcifications. Follow-up upright and supine   imaging of the abdomen may be of value. Diffuse skeletal demineralization   with anterior wedging of multiple lower thoracic vertebra as well as   incompletely visualized (reduction internal fixation hardware of the   right hip.    < end of copied text >          PAST MEDICAL & SURGICAL HISTORY:  HTN (hypertension)      Breast cancer      Spinal stenosis      H/O breast surgery          Vital Signs Last 24 Hrs  T(C): 36.8 (14 Dec 2024 17:36), Max: 36.8 (14 Dec 2024 13:50)  T(F): 98.2 (14 Dec 2024 17:36), Max: 98.3 (14 Dec 2024 13:50)  HR: 100 (14 Dec 2024 17:36) (79 - 100)  BP: 153/71 (14 Dec 2024 17:36) (94/56 - 153/71)  BP(mean): 98 (14 Dec 2024 17:36) (69 - 98)  RR: 18 (14 Dec 2024 17:36) (15 - 18)  SpO2: 94% (14 Dec 2024 17:36) (94% - 97%)    Parameters below as of 14 Dec 2024 17:36  Patient On (Oxygen Delivery Method): room air                              9.0    7.01  )-----------( 124      ( 14 Dec 2024 05:23 )             26.4     12-14    130[L]  |  100  |  15  ----------------------------<  128[H]  4.9   |  26  |  0.51    Ca    8.3[L]      14 Dec 2024 05:23          PHYSICAL EXAM  General: WN/WD NAD  Neurology: A&Ox3, nonfocal, LAU x 4  Respiratory: CTA B/L  CV: RRR, S1S2  Abdominal: distended, tympanic, soft, depressible NT, no rebound or guarding  Extremities: No edema, + peripheral pulses  rectal exam- no blood, no mass, min stool      ASSESSMENT/ PLAN:  89 yo F, lives alone HHA  8hrs/6 days, ambulates with a walker, with PMH of HTN, Spinal stenosis,  RT Breast CA s/p lumpectomy, radiation and chemo, present with right hip pain s/p unwitnessed mechanical fall. Patient reports that she walked into her bathroom without a walker when she accidentally slipped and fell on her right side. Patient denies any head trauma, LOC, headache, vision change, chest pain, numbness/tingling sensation. Patient reports since the fall she has been  unable to ambulates.    Surgery consulted for constipation with resultant dilated colon  had been on oxycodone s/p hip surgery  pt with small bm this am  has received miralax, enemas    no n/v    < from: Xray Abdomen 1 View PORTABLE -Urgent (Xray Abdomen 1 View PORTABLE -Urgent .) (12.14.24 @ 18:36) >  FINDINGS/  IMPRESSION: There is increased colonic stool burden throughout the colon   with marked diffuse colonic gaseous distention most pronounced in the   ascending and splenic flexure regions. Findings compatible witha colonic   megacolon with constipation. Limited for the assessment of   pneumoperitoneum due to the absence of an upright view. No large soft   tissue masses or pathologic calcifications. Follow-up upright and supine   imaging of the abdomen may be of value. Diffuse skeletal demineralization   with anterior wedging of multiple lower thoracic vertebra as well as   incompletely visualized (reduction internal fixation hardware of the   right hip.    < end of copied text >  seen and examined with Dr Del Rosario  npo for now  f/u CT abd/pelvis

## 2024-12-14 NOTE — PROGRESS NOTE ADULT - SUBJECTIVE AND OBJECTIVE BOX
KAELA [id number]  89yFemale    Diagnosis: 89yFemale S/p Right Hip IM Nailing POD#5    Patient was seen and evaluated at bedside. Patient with no acute complaints.   Pain is  well controlled.    Denies CP/SOB, dyspnea, paresthesias, N/V/D, palpitations.   s/p 1 unit prbc 12/13  failed tov yestesrday. vidal reinserted.     Vital Signs Last 24 Hrs  T(C): 36.7 (12-14-24 @ 04:58), Max: 37 (12-13-24 @ 11:00)  T(F): 98 (12-14-24 @ 04:58), Max: 98.6 (12-13-24 @ 11:00)  HR: 89 (12-14-24 @ 04:58) (88 - 96)  BP: 104/60 (12-14-24 @ 04:58) (104/60 - 145/72)  BP(mean): 75 (12-14-24 @ 04:58) (75 - 79)  RR: 15 (12-14-24 @ 04:58) (15 - 17)  SpO2: 95% (12-14-24 @ 04:58) (95% - 97%)      I&O's Detail    13 Dec 2024 07:01  -  14 Dec 2024 07:00  --------------------------------------------------------  IN:    Oral Fluid: 860 mL  Total IN: 860 mL    OUT:    Indwelling Catheter - Urethral (mL): 1260 mL  Total OUT: 1260 mL    Total NET: -400 mL          Physical Exam:  General: AAOx3, NAD, resting comfortably in bed.  Right Hip:  steristrips Dressing is C/D/I. Fluid Blister noted to posterior aspect of distal incision Skin is pink and warm. Staples intact. No erythema. SILT.  Wound with no drainage, healing well.   Lower extremity:  No calf tenderness, calves are soft. 2+pulses. NVI. 5/5 Strength of EHL/FHL/ADF/APF b/l.  Good capillary refill. Warm, well-perfused.                                      9.0    7.01  )-----------( 124      ( 14 Dec 2024 05:23 )             26.4   12-14    130[L]  |  100  |  15  ----------------------------<  128[H]  4.9   |  26  |  0.51    Ca    8.3[L]      14 Dec 2024 05:23          Impression:  89yFemale S/p Right Hip IM Nailing POD#5  Plan:  -  Pain management  -  DVT prophylaxis with lovenox and venodynes  -  Daily Physical Therapy:  WBAT of the Right lower extremity with appropriate assistive device   -  Discharge planning:  Rehab   - Continue with care as per medicince regarding hyponatremia  -  steristrips only at this time. replace with dressing if blisters pop  -  Continue with Post-op Antibiotics x 24hrs  - Encouraged the use of incentive spirometry   -  Case d/w DR. Landers

## 2024-12-14 NOTE — CHART NOTE - NSCHARTNOTEFT_GEN_A_CORE
Nurse called in regards to not being able to reach medicine NP. Patient vomited x 1 after eating a bite of dinner tray. Per daughter, patient just ate egg and cheese this morning, ice cream during lunch. She took a bite of dinner and then vomited.   Per nurse, patient's family had brought in food for patient to eat. Patient reports abdominal pain right now.  Last BM 2 days ago.    Gen: mildly distressed  Abd: firmly distended, +tympany, no guarding or rebound tenderness noted    - recommend npo  - recommend imaging such as abd XR or CT abd to further evaluate abdominal distention

## 2024-12-14 NOTE — PROGRESS NOTE ADULT - SUBJECTIVE AND OBJECTIVE BOX
Patient is a 89y old  Female who presents with a chief complaint of Closed fracture of hip. (14 Dec 2024 14:01)      INTERVAL HPI/OVERNIGHT EVENTS: no events noted overnight. Patient was out of bed to chair today. C.o abd pain, last bm was 2 days prior.     MEDICATIONS  (STANDING):  acetaminophen     Tablet .. 1000 milliGRAM(s) Oral every 8 hours  atorvastatin 20 milliGRAM(s) Oral at bedtime  bisacodyl Suppository 10 milliGRAM(s) Rectal daily  enoxaparin Injectable 30 milliGRAM(s) SubCutaneous every 12 hours  lidocaine   4% Patch 1 Patch Transdermal daily  metoprolol succinate ER 25 milliGRAM(s) Oral daily  multivitamin 1 Tablet(s) Oral daily  polyethylene glycol 3350 17 Gram(s) Oral daily  povidone iodine 10% Nasal Swab 1 Application(s) Both Nostrils once  senna 2 Tablet(s) Oral at bedtime  sodium chloride 1 Gram(s) Oral two times a day  sodium chloride 1.5%. 500 milliLiter(s) (50 mL/Hr) IV Continuous <Continuous>  tamsulosin 0.4 milliGRAM(s) Oral at bedtime    MEDICATIONS  (PRN):  aluminum hydroxide/magnesium hydroxide/simethicone Suspension 30 milliLiter(s) Oral every 4 hours PRN Dyspepsia  magnesium hydroxide Suspension 30 milliLiter(s) Oral daily PRN Constipation  melatonin 3 milliGRAM(s) Oral at bedtime PRN Insomnia  ondansetron Injectable 4 milliGRAM(s) IV Push every 6 hours PRN Nausea and/or Vomiting  oxyCODONE    IR 5 milliGRAM(s) Oral every 4 hours PRN Severe Pain (7 - 10)  oxyCODONE    IR 2.5 milliGRAM(s) Oral every 4 hours PRN Moderate Pain (4 - 6)      __________________________________________________  REVIEW OF SYSTEMS:    CONSTITUTIONAL: No fever,   EYES: no acute visual disturbances  NECK: No pain or stiffness  RESPIRATORY: No cough; No shortness of breath  CARDIOVASCULAR: No chest pain, no palpitations  GASTROINTESTINAL: abd pain+, No nausea or vomiting; No diarrhea   NEUROLOGICAL: No headache or numbness, no tremors  MUSCULOSKELETAL: No joint pain, no muscle pain  GENITOURINARY: no dysuria, no frequency, no hesitancy  PSYCHIATRY: no depression , no anxiety  ALL OTHER  ROS negative        Vital Signs Last 24 Hrs  T(C): 36.7 (14 Dec 2024 04:58), Max: 37 (13 Dec 2024 14:51)  T(F): 98 (14 Dec 2024 04:58), Max: 98.6 (13 Dec 2024 14:51)  HR: 86 (14 Dec 2024 10:00) (79 - 91)  BP: 113/73 (14 Dec 2024 10:00) (104/53 - 145/72)  BP(mean): 75 (14 Dec 2024 04:58) (75 - 75)  RR: 15 (14 Dec 2024 04:58) (15 - 17)  SpO2: 95% (14 Dec 2024 10:00) (95% - 97%)    Parameters below as of 14 Dec 2024 10:00  Patient On (Oxygen Delivery Method): room air        ________________________________________________  PHYSICAL EXAM:  GENERAL: NAD  HEENT: Normocephalic;  conjunctivae and sclerae clear; moist mucous membranes;   NECK : supple  CHEST/LUNG: Clear to auscultation bilaterally with good air entry   HEART: S1 S2  regular; no murmurs, gallops or rubs  ABDOMEN: Soft, lower abdominal tenderness- mild,  Nondistended; Bowel sounds present  EXTREMITIES: no cyanosis; no edema; no calf tenderness  SKIN: warm and dry; no rash  NERVOUS SYSTEM:  Awake and alert; Oriented  to place, person and time ; no new deficits    _________________________________________________  LABS:                        9.0    7.01  )-----------( 124      ( 14 Dec 2024 05:23 )             26.4     12-14    130[L]  |  100  |  15  ----------------------------<  128[H]  4.9   |  26  |  0.51    Ca    8.3[L]      14 Dec 2024 05:23        Urinalysis Basic - ( 14 Dec 2024 05:23 )    Color: x / Appearance: x / SG: x / pH: x  Gluc: 128 mg/dL / Ketone: x  / Bili: x / Urobili: x   Blood: x / Protein: x / Nitrite: x   Leuk Esterase: x / RBC: x / WBC x   Sq Epi: x / Non Sq Epi: x / Bacteria: x      CAPILLARY BLOOD GLUCOSE            RADIOLOGY & ADDITIONAL TESTS:    Imaging Personally Reviewed:  YES    Consultant(s) Notes Reviewed:   YES    Care Discussed with Consultants : YES     Plan of care was discussed with patient and /or primary care giver; all questions and concerns were addressed and care was aligned with patient's wishes.

## 2024-12-14 NOTE — CHART NOTE - NSCHARTNOTEFT_GEN_A_CORE
Abd Xray results noted:   < from: Xray Abdomen 1 View PORTABLE -Urgent (Xray Abdomen 1 View PORTABLE -Urgent .) (12.14.24 @ 18:36) >    INTERPRETATION:  XR ABDOMEN URGENT dated 12/14/2024 6:36 PM    CLINICAL INFORMATION: Female, 89 years old.  vomited,distended abd.    PRIOR STUDIES: None    FINDINGS/  IMPRESSION: There is increased colonic stool burden throughout the colon   with marked diffuse colonic gaseous distention most pronounced in the   ascending and splenic flexure regions. Findings compatible witha colonic   megacolon with constipation. Limited for the assessment of   pneumoperitoneum due to the absence of an upright view. No large soft   tissue masses or pathologic calcifications. Follow-up upright and supine   imaging of the abdomen may be of value. Diffuse skeletal demineralization   with anterior wedging of multiple lower thoracic vertebra as well as   incompletely visualized (reduction internal fixation hardware of the   right hip.    < end of copied text >    -results reviewed with primary attending Dr. Lancaster  -Keep NPO for now except for po contrast with CT  -CT abd/pelvis with po and IV contrast, urgent.   -D/w pt daughter Elayne Fermin, NP Medicine

## 2024-12-14 NOTE — PROGRESS NOTE ADULT - ASSESSMENT
89 yo F (lives alone HHA 8hrs/6 days, ambulates with a walker) w/ PMHx of HTN, Spinal stenosis, R Breast CA s/p lumpectomy, RT and chemo, p/w R hip pain s/p unwitnessed mechanical fall now admitted for surgery of intertrochanteric Fx.         A/P:  #Intertrochanteric fx S/p Right Hip Hemiarthroplasty    #Hyponatremia 2/2 SIADH   #Acute urinary retention   #HTN  #Spinal stenosis   #R breast ca     Plan:  -Hb improved after 1 unit PRBC on .   -Sodium stable at 130, star salt tabs and c/w fluid restriction. Nephro follwoing   -C/w Potter, failed TOV. Encourage OBBC, would consider another TOV in am   -BP stable, c/w valsartan , metoprolol   -PT recommended PAVITHRA , initial auth . Would need new auth   -Ortho following, c/w dvt ppx   -Patient c/o abd pain, c/w bowel regimen, simethicone   -D/w daughter at bedside

## 2024-12-14 NOTE — CHART NOTE - NSCHARTNOTEFT_GEN_A_CORE
EVENT:Informed by PA of pt with abd distention, and vomiting. Daughter at bedside.     SUBJECTIVE:Pt seen and examined.   Pt daughter Elayne endorses concerned with UTI in setting of pt c/o pain "stabbing pain with urination".   Per staff pt had smear of BM on pads this morning.     OBJECTIVE:  Vital Signs Last 24 Hrs  T(C): 36.8 (14 Dec 2024 17:36), Max: 36.8 (14 Dec 2024 13:50)  T(F): 98.2 (14 Dec 2024 17:36), Max: 98.3 (14 Dec 2024 13:50)  HR: 100 (14 Dec 2024 17:36) (79 - 100)  BP: 153/71 (14 Dec 2024 17:36) (94/56 - 153/71)  BP(mean): 98 (14 Dec 2024 17:36) (69 - 98)  RR: 18 (14 Dec 2024 17:36) (15 - 18)  SpO2: 94% (14 Dec 2024 17:36) (94% - 97%)    Parameters below as of 14 Dec 2024 17:36  Patient On (Oxygen Delivery Method): room air    Gen: NAD  HEENT:   Resp:   CVS: S1S2  GI:   Extremities : BLE   Neuro: Awake/alert.  no new neuro deficits  Skin: warm,dry      LABS:                        9.0    7.01  )-----------( 124      ( 14 Dec 2024 05:23 )             26.4     12-14    130[L]  |  100  |  15  ----------------------------<  128[H]  4.9   |  26  |  0.51    Ca    8.3[L]      14 Dec 2024 05:23        EKG:   IMAGING:    ASSESSMENT:  HPI:  89 yo F, lives alone HHA  8hrs/6 days, ambulates with a walker, with PMH of HTN, Spinal stenosis,  RT Breast CA s/p lumpectomy, radiation and chemo, present with right hip pain s/p unwitnessed mechanical fall. Patient reports that she walked into her bathroom without a walker when she accidentally slipped and fell on her right side. Patient denies any head trauma, LOC, headache, vision change, chest pain, numbness/tingling sensation. Patient reports since the fall she has been  unable to ambulates.    In the ED,   v/s: 177/80, 73, 97.4F, 96% RA  Labs: WBC 12, Hgb 14  EK BPM, NSR  CXR: negative; There is lower lumbar degeneration and left lumbar curve  Xray RT Hip:  There is an intertrochanteric fracture of the right hip with increased  angulation at the fracture site.  Xray RT Femur:  Lower femur are intact.  Xray RT knee : No effusion. Arterial calcifications. Diffuse moderate degeneration. No fracture  s/p Morphine 2mg  s/p bolus   (08 Dec 2024 18:02)  Pt admitted for  intertrochanteric fx of R hip, now s/p R hip IMN. Hospital course complicated by urinary retention. Failed TOV. Indwelling vidal in place.   Now with vomiting  in setting of abd distention, pain with urination.     PLAN:   1) abdominal distention     -Abd xray r/o obstruction       2) Dysuria  -Likely due to vidal in place    No leukocytosis, no fever  -TOV today   -obtain UA in am.     D/w primary attending Dr. Lancaster EVENT:Informed by PA of pt with abd distention, and vomiting. Daughter at bedside.     SUBJECTIVE:Pt seen and examined. Pt sitting up in bed, awake /alert, endorses "could feel better...having pain like needle low (points to low abdomen)...and pain with vidal...no more nausea and vomiting..... Have pain to R hip when I move.."Pt daughter Elayne endorses concerned with UTI in setting of pt c/o pain "stabbing pain with urination".   Per staff pt had smear of BM on pads this morning.     OBJECTIVE:  Vital Signs Last 24 Hrs  T(C): 36.8 (14 Dec 2024 17:36), Max: 36.8 (14 Dec 2024 13:50)  T(F): 98.2 (14 Dec 2024 17:36), Max: 98.3 (14 Dec 2024 13:50)  HR: 100 (14 Dec 2024 17:36) (79 - 100)  BP: 153/71 (14 Dec 2024 17:36) (94/56 - 153/71)  BP(mean): 98 (14 Dec 2024 17:36) (69 - 98)  RR: 18 (14 Dec 2024 17:36) (15 - 18)  SpO2: 94% (14 Dec 2024 17:36) (94% - 97%)    Parameters below as of 14 Dec 2024 17:36  Patient On (Oxygen Delivery Method): room air    Gen: NAD  HEENT: normocephalic  Resp: Clear breath sounds on auscultation. No rales, no wheezing.   CVS: S1S2, regular  GI: BS(+) , softly distended, mild tenderness low abdomen/pubic area.  : indwelling vidal catheter , clear yellow urine  Extremities : R hip/thigh (+) edema, dsg C/D/I . PPP, no calf tenderness.   Neuro: Awake/alert.  no new neuro deficits  Skin: warm,dry      LABS:                        9.0    7.01  )-----------( 124      ( 14 Dec 2024 05:23 )             26.4     12-14    130[L]  |  100  |  15  ----------------------------<  128[H]  4.9   |  26  |  0.51    Ca    8.3[L]      14 Dec 2024 05:23        EKG:   IMAGING:    ASSESSMENT:  HPI:  89 yo F, lives alone HHA  8hrs/6 days, ambulates with a walker, with PMH of HTN, Spinal stenosis,  RT Breast CA s/p lumpectomy, radiation and chemo, present with right hip pain s/p unwitnessed mechanical fall. Patient reports that she walked into her bathroom without a walker when she accidentally slipped and fell on her right side. Patient denies any head trauma, LOC, headache, vision change, chest pain, numbness/tingling sensation. Patient reports since the fall she has been  unable to ambulates.    In the ED,   v/s: 177/80, 73, 97.4F, 96% RA  Labs: WBC 12, Hgb 14  EK BPM, NSR  CXR: negative; There is lower lumbar degeneration and left lumbar curve  Xray RT Hip:  There is an intertrochanteric fracture of the right hip with increased  angulation at the fracture site.  Xray RT Femur:  Lower femur are intact.  Xray RT knee : No effusion. Arterial calcifications. Diffuse moderate degeneration. No fracture  s/p Morphine 2mg  s/p bolus   (08 Dec 2024 18:02)  Pt admitted for  intertrochanteric fx of R hip, now s/p R hip IMN. Hospital course complicated by urinary retention. Failed TOV. Indwelling vidal in place.   Now with vomiting  in setting of abd distention, pain with urination.     PLAN:   1) abdominal distention     -Abd xray r/o obstruction, less likely (D/w Xray tech to  expedite result).      -continue bowel regimen , MOM if no bowel obstruction     -Continue antiemetic       2) Dysuria  -Likely due to vidal in place    No leukocytosis, no fever  -TOV today   -obtain UA in am.     D/w primary attending Dr. Lancaster  D/w pt and daughter Elayne at bedside.

## 2024-12-14 NOTE — PROGRESS NOTE ADULT - ASSESSMENT
Patient is a 89yo Female with HTN on HCTZ, Spinal stenosis,  RT Breast CA s/p lumpectomy, RT/ chemo p/w Rt hip pain s/p mechanical fall. Pt a/w Rt intertrochanteric fracture s/p OR 12/9 with insertion of locking intramedullary bola into right hip. Nephrology consulted for hyponatremia    1. Hyponatremia- initial Urine Na <5, Urine Osm 650, Serum Osm 264; consistent with hypovolemia. Pt given 1.5% NaCl @ 50ml/hr x 10 hrs. Repeat serum Na improved to 127. However repeat Urine Na 64, Serum Osm 265, low uric acid, urine osm 392; consistent with SIADH. Improvement in Na+ after additional dosing of 1.5% NaCl (50 ml/hr for 10 hours). 12/13 Urine studies with Urine osmolality 392, urine Na+ 45, urine K+ 44. AM Cortisol is normal at 13.2 and TSH is also normal. 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 Recommend continuing Glucerna with each meal and a fluid restriction. Added 1 gm NaCl BID, however, upon discussion with the patient's daughter, she notes that the patient's Na+ has been ranging from 127 - 130 mEq/L for 2-3 years. The patient also normally drinks less than 48 ounces daily. There is less urgency to aggressively increase Na+. If Na+ stays stable or increases further, then will consider holding NaCl supplementation and loosening fluid restriction.     Avoid medications and infusions in hypotonic fluids. Monitor serial BMP's and UO for overcorrection. Do not correct more than 8 meQ/24 hours.    2. Essential HTN-BP acceptable. c/w Valsartan. Continue to hold HCTZ due to hyponatremia; recc to hold on discharge due to risk of hyponatremia marguerite in the elderly. Monitor BP    3. Urinary retention- pt with vidal. c/w flomax. Recc good bowel regimen for constipation.    4. Rt intertrochanteric fracture- s/p OR 12/9 with insertion of locking intramedullary bola into right hip. Avoid NSAIDs due to hyponatremia.     [facility] NEPHROLOGY  Yamil Liz M.D.  Uriel Arevalo D.O.  Domenica Sandoval M.D.  MD Marion Yun, MSN, ANP-C    Telephone: [phone]  Facsimile: [phone]    [address] [place] [address] Road, [address]  [place] [phone]

## 2024-12-14 NOTE — PROGRESS NOTE ADULT - SUBJECTIVE AND OBJECTIVE BOX
[facility] NEPHROLOGY- PROGRESS NOTE    Patient is a 89yo Female with HTN on HCTZ, Spinal stenosis,  RT Breast CA s/p lumpectomy, RT/ chemo p/w Rt hip pain s/p mechanical fall. Pt a/w Rt intertrochanteric fracture s/p OR 12/9 with insertion of locking intramedullary bola into right hip. Nephrology consulted for hyponatremia.    Sitting in chair, in no distress. Was experiencing pain with movement prior to getting into chair.    Hospital Medications: Medications reviewed.    REVIEW OF SYSTEMS:  CONSTITUTIONAL: No fevers or chills  RESPIRATORY: No shortness of breath  CARDIOVASCULAR: No chest pain.  GASTROINTESTINAL: No vomiting, diarrhea or abdominal pain. Also denies nausea and constipation.  VASCULAR: No bilateral lower extremity edema. +b/l leg pain post PT    VITALS:  T(F): 98 (12-14-24 @ 04:58), Max: 98.6 (12-13-24 @ 14:51)  HR: 86 (12-14-24 @ 10:00)  BP: 113/73 (12-14-24 @ 10:00)  RR: 15 (12-14-24 @ 04:58)  SpO2: 95% (12-14-24 @ 10:00)  Wt(kg): --    12-13 @ 07:01  -  12-14 @ 07:00  --------------------------------------------------------  IN: 860 mL / OUT: 1260 mL / NET: -400 mL        PHYSICAL EXAM:  Constitutional: NAD  HEENT: anicteric sclera,   Respiratory: CTAB, no wheezes, rales or rhonchi  Cardiovascular: S1, S2, RRR  Gastrointestinal: BS+, soft, NT/ND  : +vidal.      LABS:  12-14    Na+ trend: 130 <--, 130 <--, 130 <--, 124 <--, 127 <--, 122 <--, 123 <--    130[L]  |  100  |  15  ----------------------------<  128[H]  4.9   |  26  |  0.51    Ca    8.3[L]      14 Dec 2024 05:23      Creatinine Trend: 0.51 <--, 0.51 <--, 0.44 <--, 0.67 <--, 0.50 <--, 0.65 <--, 0.61 <--, 0.75 <--, 0.64 <--, 0.56 <--, 0.68 <--                        9.0    7.01  )-----------( 124      ( 14 Dec 2024 05:23 )             26.4     Urine Studies:  Urinalysis Basic - ( 14 Dec 2024 05:23 )    Color:  / Appearance:  / SG:  / pH:   Gluc: 128 mg/dL / Ketone:   / Bili:  / Urobili:    Blood:  / Protein:  / Nitrite:    Leuk Esterase:  / RBC:  / WBC    Sq Epi:  / Non Sq Epi:  / Bacteria:       Sodium, Random Urine: 45 mmol/L (12-13 @ 10:34)  Osmolality, Random Urine: 438 mos/kg (12-13 @ 10:34)  Potassium, Random Urine: 44 mmol/L (12-13 @ 10:34)  Osmolality, Random Urine: 392 mosm/Kg (12-12 @ 05:45)  Sodium, Random Urine: 64 mmol/L (12-12 @ 05:45)  Osmolality, Random Urine: 605 mosm/Kg (12-11 @ 10:09)  Sodium, Random Urine: <5 mmol/L (12-11 @ 10:09)    Extremities: trace RLE edema; no LLE edema               RADIOLOGY & ADDITIONAL STUDIES:

## 2024-12-15 LAB
ANION GAP SERPL CALC-SCNC: 7 MMOL/L — SIGNIFICANT CHANGE UP (ref 5–17)
APPEARANCE UR: ABNORMAL
APPEARANCE UR: ABNORMAL
BACTERIA # UR AUTO: ABNORMAL /HPF
BACTERIA # UR AUTO: ABNORMAL /HPF
BASOPHILS # BLD AUTO: 0.02 K/UL — SIGNIFICANT CHANGE UP (ref 0–0.2)
BASOPHILS NFR BLD AUTO: 0.3 % — SIGNIFICANT CHANGE UP (ref 0–2)
BILIRUB UR-MCNC: NEGATIVE — SIGNIFICANT CHANGE UP
BILIRUB UR-MCNC: NEGATIVE — SIGNIFICANT CHANGE UP
BUN SERPL-MCNC: 18 MG/DL — SIGNIFICANT CHANGE UP (ref 7–18)
CALCIUM SERPL-MCNC: 8.3 MG/DL — LOW (ref 8.4–10.5)
CHLORIDE SERPL-SCNC: 98 MMOL/L — SIGNIFICANT CHANGE UP (ref 96–108)
CO2 SERPL-SCNC: 25 MMOL/L — SIGNIFICANT CHANGE UP (ref 22–31)
COLOR SPEC: ABNORMAL
COLOR SPEC: SIGNIFICANT CHANGE UP
CREAT ?TM UR-MCNC: 61 MG/DL — SIGNIFICANT CHANGE UP
CREAT SERPL-MCNC: 0.53 MG/DL — SIGNIFICANT CHANGE UP (ref 0.5–1.3)
DIFF PNL FLD: ABNORMAL
DIFF PNL FLD: ABNORMAL
EGFR: 87 ML/MIN/1.73M2 — SIGNIFICANT CHANGE UP
EOSINOPHIL # BLD AUTO: 0.13 K/UL — SIGNIFICANT CHANGE UP (ref 0–0.5)
EOSINOPHIL NFR BLD AUTO: 1.9 % — SIGNIFICANT CHANGE UP (ref 0–6)
EPI CELLS # UR: PRESENT
EPI CELLS # UR: PRESENT
GLUCOSE SERPL-MCNC: 126 MG/DL — HIGH (ref 70–99)
GLUCOSE UR QL: NEGATIVE MG/DL — SIGNIFICANT CHANGE UP
GLUCOSE UR QL: NEGATIVE MG/DL — SIGNIFICANT CHANGE UP
HCT VFR BLD CALC: 24.6 % — LOW (ref 34.5–45)
HGB BLD-MCNC: 8.3 G/DL — LOW (ref 11.5–15.5)
IMM GRANULOCYTES NFR BLD AUTO: 1.2 % — HIGH (ref 0–0.9)
KETONES UR-MCNC: ABNORMAL MG/DL
KETONES UR-MCNC: NEGATIVE MG/DL — SIGNIFICANT CHANGE UP
LACTATE SERPL-SCNC: 0.8 MMOL/L — SIGNIFICANT CHANGE UP (ref 0.7–2)
LEUKOCYTE ESTERASE UR-ACNC: ABNORMAL
LEUKOCYTE ESTERASE UR-ACNC: ABNORMAL
LYMPHOCYTES # BLD AUTO: 1.73 K/UL — SIGNIFICANT CHANGE UP (ref 1–3.3)
LYMPHOCYTES # BLD AUTO: 25.9 % — SIGNIFICANT CHANGE UP (ref 13–44)
MAGNESIUM SERPL-MCNC: 2.3 MG/DL — SIGNIFICANT CHANGE UP (ref 1.6–2.6)
MCHC RBC-ENTMCNC: 28.9 PG — SIGNIFICANT CHANGE UP (ref 27–34)
MCHC RBC-ENTMCNC: 33.7 G/DL — SIGNIFICANT CHANGE UP (ref 32–36)
MCV RBC AUTO: 85.7 FL — SIGNIFICANT CHANGE UP (ref 80–100)
MONOCYTES # BLD AUTO: 0.9 K/UL — SIGNIFICANT CHANGE UP (ref 0–0.9)
MONOCYTES NFR BLD AUTO: 13.5 % — SIGNIFICANT CHANGE UP (ref 2–14)
NEUTROPHILS # BLD AUTO: 3.83 K/UL — SIGNIFICANT CHANGE UP (ref 1.8–7.4)
NEUTROPHILS NFR BLD AUTO: 57.2 % — SIGNIFICANT CHANGE UP (ref 43–77)
NITRITE UR-MCNC: NEGATIVE — SIGNIFICANT CHANGE UP
NITRITE UR-MCNC: NEGATIVE — SIGNIFICANT CHANGE UP
NRBC # BLD: 0 /100 WBCS — SIGNIFICANT CHANGE UP (ref 0–0)
OSMOLALITY SERPL: 267 MOSMOL/KG — LOW (ref 280–301)
OSMOLALITY UR: 538 MOS/KG — SIGNIFICANT CHANGE UP (ref 50–1200)
PH UR: 6 — SIGNIFICANT CHANGE UP (ref 5–8)
PH UR: 7 — SIGNIFICANT CHANGE UP (ref 5–8)
PLATELET # BLD AUTO: 147 K/UL — LOW (ref 150–400)
POTASSIUM SERPL-MCNC: 5.3 MMOL/L — SIGNIFICANT CHANGE UP (ref 3.5–5.3)
POTASSIUM SERPL-SCNC: 5.3 MMOL/L — SIGNIFICANT CHANGE UP (ref 3.5–5.3)
POTASSIUM UR-SCNC: 62 MMOL/L — SIGNIFICANT CHANGE UP
PROT ?TM UR-MCNC: 66 MG/DL — HIGH (ref 0–12)
PROT UR-MCNC: 30 MG/DL
PROT UR-MCNC: 30 MG/DL
PROT/CREAT UR-RTO: 1.1 RATIO — HIGH (ref 0–0.2)
RBC # BLD: 2.87 M/UL — LOW (ref 3.8–5.2)
RBC # FLD: 14.2 % — SIGNIFICANT CHANGE UP (ref 10.3–14.5)
RBC CASTS # UR COMP ASSIST: ABNORMAL /HPF
RBC CASTS # UR COMP ASSIST: ABNORMAL /HPF
SODIUM SERPL-SCNC: 130 MMOL/L — LOW (ref 135–145)
SODIUM UR-SCNC: 32 MMOL/L — SIGNIFICANT CHANGE UP
SP GR SPEC: 1.03 — HIGH (ref 1–1.03)
SP GR SPEC: 1.04 — HIGH (ref 1–1.03)
UROBILINOGEN FLD QL: 1 MG/DL — SIGNIFICANT CHANGE UP (ref 0.2–1)
UROBILINOGEN FLD QL: 1 MG/DL — SIGNIFICANT CHANGE UP (ref 0.2–1)
WBC # BLD: 6.69 K/UL — SIGNIFICANT CHANGE UP (ref 3.8–10.5)
WBC # FLD AUTO: 6.69 K/UL — SIGNIFICANT CHANGE UP (ref 3.8–10.5)
WBC UR QL: 20 /HPF — HIGH (ref 0–5)
WBC UR QL: 20 /HPF — HIGH (ref 0–5)

## 2024-12-15 PROCEDURE — 99233 SBSQ HOSP IP/OBS HIGH 50: CPT

## 2024-12-15 PROCEDURE — 99231 SBSQ HOSP IP/OBS SF/LOW 25: CPT

## 2024-12-15 RX ORDER — CEFTRIAXONE SODIUM 1 G
1000 VIAL (EA) INJECTION EVERY 24 HOURS
Refills: 0 | Status: DISCONTINUED | OUTPATIENT
Start: 2024-12-15 | End: 2024-12-16

## 2024-12-15 RX ORDER — ACETAMINOPHEN 500MG 500 MG/1
1000 TABLET, COATED ORAL ONCE
Refills: 0 | Status: COMPLETED | OUTPATIENT
Start: 2024-12-15 | End: 2024-12-15

## 2024-12-15 RX ORDER — KETOROLAC TROMETHAMINE 30 MG/ML
15 INJECTION INTRAMUSCULAR; INTRAVENOUS EVERY 6 HOURS
Refills: 0 | Status: DISCONTINUED | OUTPATIENT
Start: 2024-12-15 | End: 2024-12-16

## 2024-12-15 RX ORDER — POLYETHYLENE GLYCOL 3350 17 G/17G
17 POWDER, FOR SOLUTION ORAL
Refills: 0 | Status: DISCONTINUED | OUTPATIENT
Start: 2024-12-15 | End: 2024-12-16

## 2024-12-15 RX ADMIN — Medication 1 TABLET(S): at 12:39

## 2024-12-15 RX ADMIN — KETOROLAC TROMETHAMINE 15 MILLIGRAM(S): 30 INJECTION INTRAMUSCULAR; INTRAVENOUS at 11:30

## 2024-12-15 RX ADMIN — SODIUM CHLORIDE 1 GRAM(S): 9 INJECTION, SOLUTION INTRAMUSCULAR; INTRAVENOUS; SUBCUTANEOUS at 18:36

## 2024-12-15 RX ADMIN — KETOROLAC TROMETHAMINE 15 MILLIGRAM(S): 30 INJECTION INTRAMUSCULAR; INTRAVENOUS at 10:32

## 2024-12-15 RX ADMIN — ACETAMINOPHEN 500MG 400 MILLIGRAM(S): 500 TABLET, COATED ORAL at 15:13

## 2024-12-15 RX ADMIN — LIDOCAINE 1 PATCH: 40 CREAM TOPICAL at 19:27

## 2024-12-15 RX ADMIN — ACETAMINOPHEN 500MG 400 MILLIGRAM(S): 500 TABLET, COATED ORAL at 08:40

## 2024-12-15 RX ADMIN — LIDOCAINE 1 PATCH: 40 CREAM TOPICAL at 12:39

## 2024-12-15 RX ADMIN — ACETAMINOPHEN 500MG 400 MILLIGRAM(S): 500 TABLET, COATED ORAL at 03:01

## 2024-12-15 RX ADMIN — Medication 20 MILLIGRAM(S): at 21:07

## 2024-12-15 RX ADMIN — Medication 10 MILLIGRAM(S): at 12:39

## 2024-12-15 RX ADMIN — Medication 100 MILLIGRAM(S): at 16:30

## 2024-12-15 RX ADMIN — TAMSULOSIN HYDROCHLORIDE 0.4 MILLIGRAM(S): 0.4 CAPSULE ORAL at 21:06

## 2024-12-15 RX ADMIN — ENOXAPARIN SODIUM 30 MILLIGRAM(S): 30 INJECTION SUBCUTANEOUS at 08:40

## 2024-12-15 RX ADMIN — ACETAMINOPHEN 500MG 1000 MILLIGRAM(S): 500 TABLET, COATED ORAL at 21:42

## 2024-12-15 RX ADMIN — POLYETHYLENE GLYCOL 3350 17 GRAM(S): 17 POWDER, FOR SOLUTION ORAL at 18:37

## 2024-12-15 RX ADMIN — Medication 2 TABLET(S): at 21:07

## 2024-12-15 RX ADMIN — ACETAMINOPHEN 500MG 1000 MILLIGRAM(S): 500 TABLET, COATED ORAL at 04:00

## 2024-12-15 RX ADMIN — ACETAMINOPHEN 500MG 1000 MILLIGRAM(S): 500 TABLET, COATED ORAL at 10:05

## 2024-12-15 RX ADMIN — LIDOCAINE 1 PATCH: 40 CREAM TOPICAL at 00:15

## 2024-12-15 RX ADMIN — ACETAMINOPHEN 500MG 1000 MILLIGRAM(S): 500 TABLET, COATED ORAL at 16:00

## 2024-12-15 RX ADMIN — ACETAMINOPHEN 500MG 400 MILLIGRAM(S): 500 TABLET, COATED ORAL at 20:46

## 2024-12-15 RX ADMIN — ENOXAPARIN SODIUM 30 MILLIGRAM(S): 30 INJECTION SUBCUTANEOUS at 20:46

## 2024-12-15 NOTE — PROGRESS NOTE ADULT - NSPROGADDITIONALINFOA_GEN_ALL_CORE
I have personally seen and examined the patient with surgical team. Agree with the history, physical exam, and plan as documented by the PA.   No surgical interventions at this time, ok to start a diet and advance as tolerated. Bowel regimen, replete electrolytes PRN, avoid opioids might need pain management. Surgery remains available as needed.  Please recall PRN for questions/ concerns/ changes.  Thank you.

## 2024-12-15 NOTE — PROGRESS NOTE ADULT - SUBJECTIVE AND OBJECTIVE BOX
INTERVAL HPI/OVERNIGHT EVENTS:  Pt seen/examined at bedside. No acute complaints.     Vital Signs Last 24 Hrs  T(C): 36.8 (15 Dec 2024 05:57), Max: 37 (14 Dec 2024 20:45)  T(F): 98.3 (15 Dec 2024 05:57), Max: 98.6 (14 Dec 2024 20:45)  HR: 88 (15 Dec 2024 05:57) (86 - 100)  BP: 115/72 (15 Dec 2024 05:57) (94/56 - 153/71)  BP(mean): 98 (14 Dec 2024 17:36) (69 - 98)  RR: 18 (15 Dec 2024 05:57) (18 - 18)  SpO2: 95% (15 Dec 2024 05:57) (94% - 96%)    Parameters below as of 15 Dec 2024 05:57  Patient On (Oxygen Delivery Method): room air      I&O's Detail    14 Dec 2024 07:01  -  15 Dec 2024 07:00  --------------------------------------------------------  IN:  Total IN: 0 mL    OUT:    Indwelling Catheter - Urethral (mL): 1100 mL    Voided (mL): 250 mL  Total OUT: 1350 mL    Total NET: -1350 mL          Medications:  aluminum hydroxide/magnesium hydroxide/simethicone Suspension 30 milliLiter(s) Oral every 4 hours PRN  bisacodyl Suppository 10 milliGRAM(s) Rectal daily  magnesium hydroxide Suspension 30 milliLiter(s) Oral daily PRN  polyethylene glycol 3350 17 Gram(s) Oral daily  senna 2 Tablet(s) Oral at bedtime  tamsulosin 0.4 milliGRAM(s) Oral at bedtime      Physical Exam:  General: AAOx3, No acute distress  HEENT: NC/AT, trachea midline  Respiratory: Nonlabored breathing, equal chest rise b/l     Drains/Tubes:     12-14-24 @ 07:01  -  12-15-24 @ 07:00  --------------------------------------------------------  IN: 0 mL / OUT: 1350 mL / NET: -1350 mL        Labs:                        8.3    6.69  )-----------( 147      ( 15 Dec 2024 07:38 )             24.6     12-15    130[L]  |  98  |  18  ----------------------------<  126[H]  5.3   |  25  |  0.53    Ca    8.3[L]      15 Dec 2024 07:38  Mg     2.3     12-15          RADIOLOGY & ADDITIONAL STUDIES:  < from: CT Abdomen and Pelvis w/ Oral Cont and w/ IV Cont (12.14.24 @ 23:30) >    Preliminary: Stool and air distended colon with marked redundancy of the   sigmoid colon. No evidence of volvulus or wall thickening. Distal sigmoid   colon and rectum are relatively collapsed. No small bowel dilatation.   Cholelithiasis without evidence of cholecystitis.      < end of copied text >

## 2024-12-15 NOTE — PROGRESS NOTE ADULT - ASSESSMENT
Patient is a 89yo Female with HTN on HCTZ, Spinal stenosis,  RT Breast CA s/p lumpectomy, RT/ chemo p/w Rt hip pain s/p mechanical fall. Pt a/w Rt intertrochanteric fracture s/p OR 12/9 with insertion of locking intramedullary bola into right hip. Nephrology consulted for hyponatremia    1. Hyponatremia- initial Urine Na <5, Urine Osm 650, Serum Osm 264; consistent with hypovolemia. Pt given 1.5% NaCl @ 50ml/hr x 10 hrs. Repeat serum Na improved to 127. However repeat Urine Na 64, Serum Osm 265, low uric acid, urine osm 392; consistent with SIADH. Improvement in Na+ after additional dosing of 1.5% NaCl (50 ml/hr for 10 hours). 12/13 Urine studies with Urine osmolality 392, urine Na+ 45, urine K+ 44. AM Cortisol is normal at 13.2 and TSH is also normal. 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 Recommend continuing Glucerna with each meal and a fluid restriction. Added 1 gm NaCl BID, however, upon discussion with the patient's daughter, she notes that the patient's Na+ has been ranging from 127 - 130 mEq/L for 2-3 years. The patient also normally drinks less than 48 ounces daily. There is less urgency to aggressively increase Na+. If Na+ stays stable or increases further, then will consider holding NaCl supplementation and loosening fluid restriction.     Avoid medications and infusions in hypotonic fluids. Monitor serial BMPs and UO for overcorrection. Do not correct more than 8 meQ/24 hours.    2. Essential HTN-BP acceptable. c/w Valsartan. Continue to hold HCTZ due to hyponatremia; recc to hold on discharge due to risk of hyponatremia marguerite in the elderly. Monitor BP    3. Urinary retention - Attempting trial of void. If unable to urinate by 2 pm today, then will need reinsertion of Vidal catheter.    4. Rt intertrochanteric fracture- s/p OR 12/9 with insertion of locking intramedullary bola into right hip. Avoid NSAIDs due to hyponatremia.     UA on 12/15 is indeterminate for an infection, as this sample was taken with vidal catheter and was traumatic, based on amount of blood present. If patient is able to urinate without catheter, then recommend repeating UA and urine studies to assess for infection.    [facility]  Yamil Liz M.D.  Uriel Arevalo D.O.  Domenica Sandoval M.D.  MD Marion Yun, MSN, ANP-C    Telephone: [phone]  Facsimile: [phone]    [address] [place] [address], [address]  [place] [address]

## 2024-12-15 NOTE — PROGRESS NOTE ADULT - SUBJECTIVE AND OBJECTIVE BOX
KAELA [id number]  89yFemale    Diagnosis: 89yFemale S/p Right Hip IM Nailing POD#6    Patient was seen and evaluated at bedside. Patient with episode of vomiting and abdominal pain yesterday with dysuria.   Pain is  better today.  Denies CP/SOB, dyspnea, paresthesias, N/V/D, palpitations.   s/p 1 unit prbc 12/13      Vital Signs Last 24 Hrs  T(C): 36.8 (12-15-24 @ 05:57), Max: 37 (12-14-24 @ 20:45)  T(F): 98.3 (12-15-24 @ 05:57), Max: 98.6 (12-14-24 @ 20:45)  HR: 88 (12-15-24 @ 05:57) (79 - 100)  BP: 115/72 (12-15-24 @ 05:57) (94/56 - 153/71)  BP(mean): 98 (12-14-24 @ 17:36) (69 - 98)  RR: 18 (12-15-24 @ 05:57) (18 - 18)  SpO2: 95% (12-15-24 @ 05:57) (94% - 97%)    I&O's Detail    14 Dec 2024 07:01  -  15 Dec 2024 07:00  --------------------------------------------------------  IN:  Total IN: 0 mL    OUT:    Indwelling Catheter - Urethral (mL): 1100 mL    Voided (mL): 250 mL  Total OUT: 1350 mL    Total NET: -1350 mL          Physical Exam:  General: AAOx3, NAD, resting comfortably in bed.  Right Hip:  steristrips Dressing is C/D/I. Fluid Blister noted to posterior aspect of distal incision Skin is pink and warm. Staples intact. No erythema. SILT.  Wound with no drainage, healing well.   Lower extremity:  No calf tenderness, calves are soft. 2+pulses. NVI. 5/5 Strength of EHL/FHL/ADF/APF b/l.  Good capillary refill. Warm, well-perfused.                                                 9.0    7.01  )-----------( 124      ( 14 Dec 2024 05:23 )             26.4   12-14    130[L]  |  100  |  15  ----------------------------<  128[H]  4.9   |  26  |  0.51    Ca    8.3[L]      14 Dec 2024 05:23              Impression:  89yFemale S/p Right Hip IM Nailing POD#6  Plan:    - appreciate care as per surgery and medicine  -  Pain management  -  DVT prophylaxis with lovenox and venodynes  -  Daily Physical Therapy:  WBAT of the Right lower extremity with appropriate assistive device   -  Discharge planning:  Rehab   -  steristrips only at this time. replace with dressing if blisters pop  - Encouraged the use of incentive spirometry   -  Case d/w DR. Landers  KAELA [id number]  89yFemale    Diagnosis: 89yFemale S/p Right Hip IM Nailing POD#6    Patient was seen and evaluated at bedside. Patient with episode of vomiting and abdominal pain yesterday with dysuria.   vidal was removed last night - no voiding since so far.   Pain is  better today in the stomach, but worse in the hip at this time.   Denies CP/SOB, dyspnea, paresthesias, N/V/D, palpitations.   s/p 1 unit prbc 12/13      Vital Signs Last 24 Hrs  T(C): 36.8 (12-15-24 @ 05:57), Max: 37 (12-14-24 @ 20:45)  T(F): 98.3 (12-15-24 @ 05:57), Max: 98.6 (12-14-24 @ 20:45)  HR: 88 (12-15-24 @ 05:57) (79 - 100)  BP: 115/72 (12-15-24 @ 05:57) (94/56 - 153/71)  BP(mean): 98 (12-14-24 @ 17:36) (69 - 98)  RR: 18 (12-15-24 @ 05:57) (18 - 18)  SpO2: 95% (12-15-24 @ 05:57) (94% - 97%)    I&O's Detail    14 Dec 2024 07:01  -  15 Dec 2024 07:00  --------------------------------------------------------  IN:  Total IN: 0 mL    OUT:    Indwelling Catheter - Urethral (mL): 1100 mL    Voided (mL): 250 mL  Total OUT: 1350 mL    Total NET: -1350 mL          Physical Exam:  General: AAOx3, NAD, resting comfortably in bed.  Right Hip:  steristrips Dressing is C/D/I. Fluid Blister noted to posterior aspect of distal incision Skin is pink and warm. Staples intact. No erythema. SILT.  Wound with no drainage, healing well.   Lower extremity:  No calf tenderness, calves are soft. 2+pulses. NVI. 5/5 Strength of EHL/FHL/ADF/APF b/l.  Good capillary refill. Warm, well-perfused.                                                 9.0    7.01  )-----------( 124      ( 14 Dec 2024 05:23 )             26.4   12-14    130[L]  |  100  |  15  ----------------------------<  128[H]  4.9   |  26  |  0.51    Ca    8.3[L]      14 Dec 2024 05:23              Impression:  89yFemale S/p Right Hip IM Nailing POD#6  Plan:    - appreciate care as per surgery and medicine  -  Pain management  -  DVT prophylaxis with lovenox and venodynes  -  Daily Physical Therapy:  WBAT of the Right lower extremity with appropriate assistive device   -  Discharge planning:  Rehab     - encouraged immobilization to help with pain and voiding.     - patient and daughter aware if patient continues to retain, will need catheterization.   -  steristrips only at this time. replace with dressing if blisters pop  - Encouraged the use of incentive spirometry   -  Case d/w DR. Landers

## 2024-12-15 NOTE — PROGRESS NOTE ADULT - ASSESSMENT
89 yo F (lives alone [facility] 8hrs/6 days, ambulates with a walker) w/ PMHx of HTN, Spinal stenosis, R Breast CA s/p lumpectomy, RT and chemo, p/w R hip pain s/p unwitnessed mechanical fall now admitted for surgery of intertrochanteric Fx.         A/P:  #Intertrochanteric fx S/p Right Hip Hemiarthroplasty 12/09   #Hyponatremia 2/2 SIADH   #Acute urinary retention   #Distended colon without obstruction   #HTN  #Spinal stenosis   #R breast ca     Plan:  _patient noted w/ abdominal distention and had vomiting x1 overnight. Abd x-ray noted. CT ordered which showed- Distended colon with decompression of the rectosigmoid colon without   evidence of obstruction.  -Appreciate surgery recs, would start aggressive bowel regimen. Give daily enema   -Diet advanced today   -C/w pain meds, off opioids now.   -Sodium stable, c/w salt tabs   -patient is off Potter, awaiting TOV. UA positive- start Rocephin x3 days for cystitis   -BP stable    -PT PAVITHRA, needs new auth

## 2024-12-15 NOTE — PROGRESS NOTE ADULT - ASSESSMENT
89 year old female s/p recent ortho surgery with 1 episode of vomiting yesterday.  lactate wnl     - recommend pain management for multimodal pain control since pt has been on oxy since surgery   - bowel regimen  - surgery to sign off, reconsult as needed

## 2024-12-15 NOTE — PROGRESS NOTE ADULT - SUBJECTIVE AND OBJECTIVE BOX
[facility] NEPHROLOGY- PROGRESS NOTE    Patient is a 89yo Female with HTN on HCTZ, Spinal stenosis,  RT Breast CA s/p lumpectomy, RT/ chemo p/w Rt hip pain s/p mechanical fall. Pt a/w Rt intertrochanteric fracture s/p OR 12/9 with insertion of locking intramedullary bola into right hip. Nephrology consulted for hyponatremia.    Lying in bed, in no distress. Events from overnight noted. Patient underwent CT of the Abdomen due to abdominal bloating. No immediate surgical intervention needed. Per discussion with patient's daughter at bedside, the patient is feeling lethargic, but it is hard to determine if this is due to a new infection because she has been lethargic since arrival to the hospital.    Hospital Medications: Medications reviewed.    REVIEW OF SYSTEMS:  CONSTITUTIONAL: Fatigued. No fevers or chills  RESPIRATORY: No shortness of breath  CARDIOVASCULAR: No chest pain.  GASTROINTESTINAL: Abdominal bloating.  VASCULAR: No bilateral lower extremity edema. +b/l leg pain post PT    VITALS:  T(F): 98.3 (12-15-24 @ 05:57), Max: 98.6 (12-14-24 @ 20:45)  HR: 88 (12-15-24 @ 05:57)  BP: 115/72 (12-15-24 @ 05:57)  RR: 18 (12-15-24 @ 05:57)  SpO2: 95% (12-15-24 @ 05:57)  Wt(kg): --    12-14 @ 07:01  -  12-15 @ 07:00  --------------------------------------------------------  IN: 0 mL / OUT: 1350 mL / NET: -1350 mL        PHYSICAL EXAM:  Constitutional: NAD  HEENT: anicteric sclera,   Respiratory: CTAB, no wheezes, rales or rhonchi  Cardiovascular: S1, S2, RRR  Gastrointestinal: BS+, soft, NT/ND  : Potter catheter removed, attempting trial of void.  Extremities: trace RLE edema; no LLE edema       LABS:  12-15    Na+ trend: 130 <--, 130 <--, 130 <--, 130 <--, 124 <--, 127 <--, 122 <--    130[L]  |  98  |  18  ----------------------------<  126[H]  5.3   |  25  |  0.53    Ca    8.3[L]      15 Dec 2024 07:38  Mg     2.3     12-15      Creatinine Trend: 0.53 <--, 0.51 <--, 0.51 <--, 0.44 <--, 0.67 <--, 0.50 <--, 0.65 <--, 0.61 <--, 0.75 <--, 0.64 <--, 0.56 <--, 0.68 <--                        8.3    6.69  )-----------( 147      ( 15 Dec 2024 07:38 )             24.6     Urine Studies:  Urinalysis Basic - ( 15 Dec 2024 07:38 )    Color:  / Appearance:  / SG:  / pH:   Gluc: 126 mg/dL / Ketone:   / Bili:  / Urobili:    Blood:  / Protein:  / Nitrite:    Leuk Esterase:  / RBC:  / WBC    Sq Epi:  / Non Sq Epi:  / Bacteria:       Sodium, Random Urine: 45 mmol/L (12-13 @ 10:34)  Osmolality, Random Urine: 438 mos/kg (12-13 @ 10:34)  Potassium, Random Urine: 44 mmol/L (12-13 @ 10:34)  Osmolality, Random Urine: 392 mosm/Kg (12-12 @ 05:45)  Sodium, Random Urine: 64 mmol/L (12-12 @ 05:45)  Osmolality, Random Urine: 605 mosm/Kg (12-11 @ 10:09)  Sodium, Random Urine: <5 mmol/L (12-11 @ 10:09)      RADIOLOGY & ADDITIONAL STUDIES:    < from: CT Abdomen and Pelvis w/ Oral Cont and w/ IV Cont (12.14.24 @ 23:30) >    Preliminary: Stool and air distended colon with marked redundancy of the   sigmoid colon. No evidence of volvulus or wall thickening. Distal sigmoid   colon and rectum are relatively collapsed. No small bowel dilatation.   Cholelithiasis without evidence of cholecystitis.      ******PRELIMINARY REPORT******      ******PRELIMINARY REPORT******     < end of copied text >    < from: Xray Abdomen 1 View PORTABLE -Urgent (Xray Abdomen 1 View PORTABLE -Urgent .) (12.14.24 @ 18:36) >  There is increased colonic stool burden throughout the colon   with marked diffuse colonic gaseous distention most pronounced in the   ascending and splenic flexure regions. Findings compatible witha colonic   megacolon with constipation. Limited for the assessment of   pneumoperitoneum due to the absence of an upright view. No large soft   tissue masses or pathologic calcifications. Follow-up upright and supine   imaging of the abdomen may be of value. Diffuse skeletal demineralization   with anterior wedging of multiple lower thoracic vertebra as well as   incompletely visualized (reduction internal fixation hardware of the   right hip.    < end of copied text >

## 2024-12-15 NOTE — PROGRESS NOTE ADULT - SUBJECTIVE AND OBJECTIVE BOX
Patient is a 89y old  Female who presents with a chief complaint of Closed fracture of hip. (15 Dec 2024 11:50)      INTERVAL HPI/OVERNIGHT EVENTS: no events noted overnight.    MEDICATIONS  (STANDING):  acetaminophen   IVPB .. 1000 milliGRAM(s) IV Intermittent once  acetaminophen   IVPB .. 1000 milliGRAM(s) IV Intermittent once  atorvastatin 20 milliGRAM(s) Oral at bedtime  bisacodyl Suppository 10 milliGRAM(s) Rectal daily  cefTRIAXone   IVPB 1000 milliGRAM(s) IV Intermittent every 24 hours  enoxaparin Injectable 30 milliGRAM(s) SubCutaneous every 12 hours  lidocaine   4% Patch 1 Patch Transdermal daily  metoprolol succinate ER 25 milliGRAM(s) Oral daily  multivitamin 1 Tablet(s) Oral daily  polyethylene glycol 3350 17 Gram(s) Oral two times a day  povidone iodine 10% Nasal Swab 1 Application(s) Both Nostrils once  senna 2 Tablet(s) Oral at bedtime  sodium chloride 1 Gram(s) Oral two times a day  sodium chloride 1.5%. 500 milliLiter(s) (50 mL/Hr) IV Continuous <Continuous>  tamsulosin 0.4 milliGRAM(s) Oral at bedtime    MEDICATIONS  (PRN):  aluminum hydroxide/magnesium hydroxide/simethicone Suspension 30 milliLiter(s) Oral every 4 hours PRN Dyspepsia  ketorolac   Injectable 15 milliGRAM(s) IV Push every 6 hours PRN Severe Pain (7 - 10)  magnesium hydroxide Suspension 30 milliLiter(s) Oral daily PRN Constipation  melatonin 3 milliGRAM(s) Oral at bedtime PRN Insomnia  ondansetron Injectable 4 milliGRAM(s) IV Push every 6 hours PRN Nausea and/or Vomiting      __________________________________________________  REVIEW OF SYSTEMS:    CONSTITUTIONAL: No fever,   EYES: no acute visual disturbances  NECK: No pain or stiffness  RESPIRATORY: No cough; No shortness of breath  CARDIOVASCULAR: No chest pain, no palpitations  GASTROINTESTINAL: No pain. No nausea or vomiting; No diarrhea   NEUROLOGICAL: No headache or numbness, no tremors  MUSCULOSKELETAL: No joint pain, no muscle pain  GENITOURINARY: no dysuria, no frequency, no hesitancy  PSYCHIATRY: no depression , no anxiety  ALL OTHER  ROS negative        Vital Signs Last 24 Hrs  T(C): 36.8 (15 Dec 2024 13:46), Max: 37 (14 Dec 2024 20:45)  T(F): 98.2 (15 Dec 2024 13:46), Max: 98.6 (14 Dec 2024 20:45)  HR: 76 (15 Dec 2024 13:46) (74 - 100)  BP: 109/72 (15 Dec 2024 13:46) (109/72 - 153/71)  BP(mean): 98 (14 Dec 2024 17:36) (98 - 98)  RR: 18 (15 Dec 2024 13:46) (18 - 18)  SpO2: 97% (15 Dec 2024 13:46) (94% - 98%)    Parameters below as of 15 Dec 2024 13:46  Patient On (Oxygen Delivery Method): room air        ________________________________________________  PHYSICAL EXAM:  GENERAL: NAD  HEENT: Normocephalic;  conjunctivae and sclerae clear; moist mucous membranes;   NECK : supple  CHEST/LUNG: Clear to auscultation bilaterally with good air entry   HEART: S1 S2  regular; no murmurs, gallops or rubs  ABDOMEN: Soft, Nontender, distended,  Bowel sounds present  EXTREMITIES: no cyanosis; no edema; no calf tenderness  SKIN: warm and dry; no rash  NERVOUS SYSTEM:  Awake and alert; Oriented  to place, person and time ; no new deficits    _________________________________________________  LABS:                        8.3    6.69  )-----------( 147      ( 15 Dec 2024 07:38 )             24.6     12-15    130[L]  |  98  |  18  ----------------------------<  126[H]  5.3   |  25  |  0.53    Ca    8.3[L]      15 Dec 2024 07:38  Mg     2.3     12-15        Urinalysis Basic - ( 15 Dec 2024 07:38 )    Color: x / Appearance: x / SG: x / pH: x  Gluc: 126 mg/dL / Ketone: x  / Bili: x / Urobili: x   Blood: x / Protein: x / Nitrite: x   Leuk Esterase: x / RBC: x / WBC x   Sq Epi: x / Non Sq Epi: x / Bacteria: x      CAPILLARY BLOOD GLUCOSE            RADIOLOGY & ADDITIONAL TESTS:    Imaging Personally Reviewed:  YES    Consultant(s) Notes Reviewed:   YES    Care Discussed with Consultants : YES     Plan of care was discussed with patient and /or primary care giver; all questions and concerns were addressed and care was aligned with patient's wishes.

## 2024-12-16 ENCOUNTER — TRANSCRIPTION ENCOUNTER (OUTPATIENT)
Age: 88
End: 2024-12-16

## 2024-12-16 VITALS — HEART RATE: 73 BPM | DIASTOLIC BLOOD PRESSURE: 67 MMHG | SYSTOLIC BLOOD PRESSURE: 106 MMHG

## 2024-12-16 LAB
ANION GAP SERPL CALC-SCNC: 6 MMOL/L — SIGNIFICANT CHANGE UP (ref 5–17)
BASOPHILS # BLD AUTO: 0.02 K/UL — SIGNIFICANT CHANGE UP (ref 0–0.2)
BASOPHILS NFR BLD AUTO: 0.4 % — SIGNIFICANT CHANGE UP (ref 0–2)
BUN SERPL-MCNC: 21 MG/DL — HIGH (ref 7–18)
CALCIUM SERPL-MCNC: 8.1 MG/DL — LOW (ref 8.4–10.5)
CHLORIDE SERPL-SCNC: 99 MMOL/L — SIGNIFICANT CHANGE UP (ref 96–108)
CO2 SERPL-SCNC: 27 MMOL/L — SIGNIFICANT CHANGE UP (ref 22–31)
CREAT SERPL-MCNC: 0.53 MG/DL — SIGNIFICANT CHANGE UP (ref 0.5–1.3)
EGFR: 87 ML/MIN/1.73M2 — SIGNIFICANT CHANGE UP
EOSINOPHIL # BLD AUTO: 0.1 K/UL — SIGNIFICANT CHANGE UP (ref 0–0.5)
EOSINOPHIL NFR BLD AUTO: 2 % — SIGNIFICANT CHANGE UP (ref 0–6)
GLUCOSE SERPL-MCNC: 114 MG/DL — HIGH (ref 70–99)
HCT VFR BLD CALC: 23.7 % — LOW (ref 34.5–45)
HCT VFR BLD CALC: 24.7 % — LOW (ref 34.5–45)
HGB BLD-MCNC: 7.9 G/DL — LOW (ref 11.5–15.5)
HGB BLD-MCNC: 8.2 G/DL — LOW (ref 11.5–15.5)
IMM GRANULOCYTES NFR BLD AUTO: 1.6 % — HIGH (ref 0–0.9)
LYMPHOCYTES # BLD AUTO: 1.06 K/UL — SIGNIFICANT CHANGE UP (ref 1–3.3)
LYMPHOCYTES # BLD AUTO: 20.8 % — SIGNIFICANT CHANGE UP (ref 13–44)
MCHC RBC-ENTMCNC: 28.9 PG — SIGNIFICANT CHANGE UP (ref 27–34)
MCHC RBC-ENTMCNC: 29.7 PG — SIGNIFICANT CHANGE UP (ref 27–34)
MCHC RBC-ENTMCNC: 33.2 G/DL — SIGNIFICANT CHANGE UP (ref 32–36)
MCHC RBC-ENTMCNC: 33.3 G/DL — SIGNIFICANT CHANGE UP (ref 32–36)
MCV RBC AUTO: 86.8 FL — SIGNIFICANT CHANGE UP (ref 80–100)
MCV RBC AUTO: 89.5 FL — SIGNIFICANT CHANGE UP (ref 80–100)
MONOCYTES # BLD AUTO: 0.7 K/UL — SIGNIFICANT CHANGE UP (ref 0–0.9)
MONOCYTES NFR BLD AUTO: 13.7 % — SIGNIFICANT CHANGE UP (ref 2–14)
NEUTROPHILS # BLD AUTO: 3.14 K/UL — SIGNIFICANT CHANGE UP (ref 1.8–7.4)
NEUTROPHILS NFR BLD AUTO: 61.5 % — SIGNIFICANT CHANGE UP (ref 43–77)
NRBC # BLD: 0 /100 WBCS — SIGNIFICANT CHANGE UP (ref 0–0)
NRBC # BLD: 0 /100 WBCS — SIGNIFICANT CHANGE UP (ref 0–0)
PLATELET # BLD AUTO: 159 K/UL — SIGNIFICANT CHANGE UP (ref 150–400)
PLATELET # BLD AUTO: 202 K/UL — SIGNIFICANT CHANGE UP (ref 150–400)
POTASSIUM SERPL-MCNC: 4.9 MMOL/L — SIGNIFICANT CHANGE UP (ref 3.5–5.3)
POTASSIUM SERPL-SCNC: 4.9 MMOL/L — SIGNIFICANT CHANGE UP (ref 3.5–5.3)
RBC # BLD: 2.73 M/UL — LOW (ref 3.8–5.2)
RBC # BLD: 2.76 M/UL — LOW (ref 3.8–5.2)
RBC # FLD: 14.4 % — SIGNIFICANT CHANGE UP (ref 10.3–14.5)
RBC # FLD: 14.6 % — HIGH (ref 10.3–14.5)
SODIUM SERPL-SCNC: 132 MMOL/L — LOW (ref 135–145)
UUN UR-MCNC: 593 MG/DL — SIGNIFICANT CHANGE UP
WBC # BLD: 5.1 K/UL — SIGNIFICANT CHANGE UP (ref 3.8–10.5)
WBC # BLD: 5.44 K/UL — SIGNIFICANT CHANGE UP (ref 3.8–10.5)
WBC # FLD AUTO: 5.1 K/UL — SIGNIFICANT CHANGE UP (ref 3.8–10.5)
WBC # FLD AUTO: 5.44 K/UL — SIGNIFICANT CHANGE UP (ref 3.8–10.5)

## 2024-12-16 PROCEDURE — 97116 GAIT TRAINING THERAPY: CPT

## 2024-12-16 PROCEDURE — 84300 ASSAY OF URINE SODIUM: CPT

## 2024-12-16 PROCEDURE — P9040: CPT

## 2024-12-16 PROCEDURE — 99285 EMERGENCY DEPT VISIT HI MDM: CPT | Mod: 25

## 2024-12-16 PROCEDURE — 73501 X-RAY EXAM HIP UNI 1 VIEW: CPT

## 2024-12-16 PROCEDURE — 96372 THER/PROPH/DIAG INJ SC/IM: CPT

## 2024-12-16 PROCEDURE — 96376 TX/PRO/DX INJ SAME DRUG ADON: CPT

## 2024-12-16 PROCEDURE — 84443 ASSAY THYROID STIM HORMONE: CPT

## 2024-12-16 PROCEDURE — 99232 SBSQ HOSP IP/OBS MODERATE 35: CPT

## 2024-12-16 PROCEDURE — 93970 EXTREMITY STUDY: CPT

## 2024-12-16 PROCEDURE — 86850 RBC ANTIBODY SCREEN: CPT

## 2024-12-16 PROCEDURE — 74177 CT ABD & PELVIS W/CONTRAST: CPT | Mod: MC

## 2024-12-16 PROCEDURE — 84133 ASSAY OF URINE POTASSIUM: CPT

## 2024-12-16 PROCEDURE — 83930 ASSAY OF BLOOD OSMOLALITY: CPT

## 2024-12-16 PROCEDURE — 99239 HOSP IP/OBS DSCHRG MGMT >30: CPT

## 2024-12-16 PROCEDURE — 74018 RADEX ABDOMEN 1 VIEW: CPT

## 2024-12-16 PROCEDURE — 36430 TRANSFUSION BLD/BLD COMPNT: CPT

## 2024-12-16 PROCEDURE — 85610 PROTHROMBIN TIME: CPT

## 2024-12-16 PROCEDURE — 84156 ASSAY OF PROTEIN URINE: CPT

## 2024-12-16 PROCEDURE — 84100 ASSAY OF PHOSPHORUS: CPT

## 2024-12-16 PROCEDURE — 83935 ASSAY OF URINE OSMOLALITY: CPT

## 2024-12-16 PROCEDURE — 80053 COMPREHEN METABOLIC PANEL: CPT

## 2024-12-16 PROCEDURE — 81001 URINALYSIS AUTO W/SCOPE: CPT

## 2024-12-16 PROCEDURE — C1713: CPT

## 2024-12-16 PROCEDURE — 85027 COMPLETE CBC AUTOMATED: CPT

## 2024-12-16 PROCEDURE — 85730 THROMBOPLASTIN TIME PARTIAL: CPT

## 2024-12-16 PROCEDURE — 83735 ASSAY OF MAGNESIUM: CPT

## 2024-12-16 PROCEDURE — 84550 ASSAY OF BLOOD/URIC ACID: CPT

## 2024-12-16 PROCEDURE — 86901 BLOOD TYPING SEROLOGIC RH(D): CPT

## 2024-12-16 PROCEDURE — 73562 X-RAY EXAM OF KNEE 3: CPT

## 2024-12-16 PROCEDURE — 86923 COMPATIBILITY TEST ELECTRIC: CPT

## 2024-12-16 PROCEDURE — 80048 BASIC METABOLIC PNL TOTAL CA: CPT

## 2024-12-16 PROCEDURE — 73552 X-RAY EXAM OF FEMUR 2/>: CPT

## 2024-12-16 PROCEDURE — 71045 X-RAY EXAM CHEST 1 VIEW: CPT

## 2024-12-16 PROCEDURE — 97530 THERAPEUTIC ACTIVITIES: CPT

## 2024-12-16 PROCEDURE — 97110 THERAPEUTIC EXERCISES: CPT

## 2024-12-16 PROCEDURE — 36415 COLL VENOUS BLD VENIPUNCTURE: CPT

## 2024-12-16 PROCEDURE — 76000 FLUOROSCOPY <1 HR PHYS/QHP: CPT

## 2024-12-16 PROCEDURE — 83605 ASSAY OF LACTIC ACID: CPT

## 2024-12-16 PROCEDURE — 97161 PT EVAL LOW COMPLEX 20 MIN: CPT

## 2024-12-16 PROCEDURE — 86900 BLOOD TYPING SEROLOGIC ABO: CPT

## 2024-12-16 PROCEDURE — 93005 ELECTROCARDIOGRAM TRACING: CPT

## 2024-12-16 PROCEDURE — 82533 TOTAL CORTISOL: CPT

## 2024-12-16 PROCEDURE — 85025 COMPLETE CBC W/AUTO DIFF WBC: CPT

## 2024-12-16 PROCEDURE — 84540 ASSAY OF URINE/UREA-N: CPT

## 2024-12-16 PROCEDURE — 96374 THER/PROPH/DIAG INJ IV PUSH: CPT

## 2024-12-16 PROCEDURE — 82570 ASSAY OF URINE CREATININE: CPT

## 2024-12-16 RX ORDER — MAGNESIUM, ALUMINUM HYDROXIDE 200-225/5
30 SUSPENSION, ORAL (FINAL DOSE FORM) ORAL
Qty: 0 | Refills: 0 | DISCHARGE
Start: 2024-12-16

## 2024-12-16 RX ORDER — NAPROXEN SODIUM 275 MG
250 TABLET ORAL
Refills: 0 | Status: DISCONTINUED | OUTPATIENT
Start: 2024-12-16 | End: 2024-12-16

## 2024-12-16 RX ORDER — AMLODIPINE BESYLATE 10 MG/1
1 TABLET ORAL
Refills: 0 | DISCHARGE

## 2024-12-16 RX ORDER — ACETAMINOPHEN 500MG 500 MG/1
1000 TABLET, COATED ORAL EVERY 8 HOURS
Refills: 0 | Status: DISCONTINUED | OUTPATIENT
Start: 2024-12-16 | End: 2024-12-16

## 2024-12-16 RX ORDER — ACETAMINOPHEN 500MG 500 MG/1
1000 TABLET, COATED ORAL ONCE
Refills: 0 | Status: COMPLETED | OUTPATIENT
Start: 2024-12-16 | End: 2024-12-16

## 2024-12-16 RX ORDER — ACETAMINOPHEN 500MG 500 MG/1
2 TABLET, COATED ORAL
Qty: 0 | Refills: 0 | DISCHARGE
Start: 2024-12-16

## 2024-12-16 RX ORDER — POLYETHYLENE GLYCOL 3350 17 G/17G
17 POWDER, FOR SOLUTION ORAL
Qty: 0 | Refills: 0 | DISCHARGE
Start: 2024-12-16

## 2024-12-16 RX ORDER — HYDROCHLOROTHIAZIDE 50 MG
1 TABLET ORAL
Refills: 0 | DISCHARGE

## 2024-12-16 RX ORDER — SODIUM CHLORIDE 9 MG/ML
1 INJECTION, SOLUTION INTRAMUSCULAR; INTRAVENOUS; SUBCUTANEOUS
Qty: 0 | Refills: 0 | DISCHARGE
Start: 2024-12-16

## 2024-12-16 RX ADMIN — KETOROLAC TROMETHAMINE 15 MILLIGRAM(S): 30 INJECTION INTRAMUSCULAR; INTRAVENOUS at 02:23

## 2024-12-16 RX ADMIN — Medication 100 MILLIGRAM(S): at 12:42

## 2024-12-16 RX ADMIN — METOPROLOL TARTRATE 25 MILLIGRAM(S): 100 TABLET, FILM COATED ORAL at 05:26

## 2024-12-16 RX ADMIN — LIDOCAINE 1 PATCH: 40 CREAM TOPICAL at 00:26

## 2024-12-16 RX ADMIN — POLYETHYLENE GLYCOL 3350 17 GRAM(S): 17 POWDER, FOR SOLUTION ORAL at 05:26

## 2024-12-16 RX ADMIN — ENOXAPARIN SODIUM 30 MILLIGRAM(S): 30 INJECTION SUBCUTANEOUS at 11:18

## 2024-12-16 RX ADMIN — ACETAMINOPHEN 500MG 1000 MILLIGRAM(S): 500 TABLET, COATED ORAL at 04:19

## 2024-12-16 RX ADMIN — Medication 1 TABLET(S): at 11:20

## 2024-12-16 RX ADMIN — SODIUM CHLORIDE 1 GRAM(S): 9 INJECTION, SOLUTION INTRAMUSCULAR; INTRAVENOUS; SUBCUTANEOUS at 05:26

## 2024-12-16 RX ADMIN — ACETAMINOPHEN 500MG 400 MILLIGRAM(S): 500 TABLET, COATED ORAL at 03:29

## 2024-12-16 RX ADMIN — Medication 10 MILLIGRAM(S): at 11:19

## 2024-12-16 RX ADMIN — LIDOCAINE 1 PATCH: 40 CREAM TOPICAL at 11:20

## 2024-12-16 RX ADMIN — KETOROLAC TROMETHAMINE 15 MILLIGRAM(S): 30 INJECTION INTRAMUSCULAR; INTRAVENOUS at 01:23

## 2024-12-16 NOTE — PROGRESS NOTE ADULT - REASON FOR ADMISSION
Closed fracture of hip.

## 2024-12-16 NOTE — CHART NOTE - NSCHARTNOTESELECT_GEN_ALL_CORE
Family update/Event Note
PCP call/Event Note
abd xray/Event Note
emesis/Event Note
hyponatremia/Event Note
Event Note
Post op Check
Urinary retention/Event Note
hyponatremia/Event Note
labs/Event Note
vomiting/abd distention/Event Note

## 2024-12-16 NOTE — PROGRESS NOTE ADULT - SUBJECTIVE AND OBJECTIVE BOX
[facility] [place]PHROLOGY- PROGRESS NOTE    Patient is a 89yo Female with HTN on HCTZ, Spinal stenosis,  RT Breast CA s/p lumpectomy, RT/ chemo p/w Rt hip pain s/p mechanical fall. Pt a/w Rt intertrochanteric fracture s/p OR 12/9 with insertion of locking intramedullary bola into right hip. Nephrology consulted for hyponatremia.    Lying in bed, in no distress. Events from overnight noted. Patient underwent CT of the Abdomen due to abdominal bloating. No immediate surgical intervention needed. Per discussion with patient's daughter at bedside, the patient is feeling lethargic, but it is hard to determine if this is due to a new infection because she has been lethargic since arrival to the hospital.    Hospital Medications: Medications reviewed.    REVIEW OF SYSTEMS:  CONSTITUTIONAL:   No fevers or chills  RESPIRATORY: No shortness of breath  CARDIOVASCULAR: No chest pain.  GASTROINTESTINAL: No nausea, vomiting or diarrhea +BM yesterday  : +difficulty urinating  VASCULAR: No bilateral lower extremity edema. ++rt thigh pain    VITALS:  T(F): 98.4 (12-16-24 @ 05:38), Max: 98.4 (12-16-24 @ 05:38)  HR: 73 (12-16-24 @ 10:26)  BP: 106/67 (12-16-24 @ 10:26)  RR: 18 (12-16-24 @ 05:38)  SpO2: 95% (12-16-24 @ 05:38)  Wt(kg): --    12-15 @ 07:01  -  12-16 @ 07:00  --------------------------------------------------------  IN: 0 mL / OUT: 1000 mL / NET: -1000 mL      PHYSICAL EXAM:  Constitutional: NAD  HEENT: anicteric sclera,   Respiratory: CTAB, no wheezes, rales or rhonchi  Cardiovascular: S1, S2, RRR  Gastrointestinal: BS+, soft, NT/ND  : +purewick  Extremities: trace RLE edema; no LLE edema       LABS:  12-16  132 <--, 130 <--, 130 <--, 130 <--, 130 <--, 124 <--, 127 <--  132[L]  |  99  |  21[H]  ----------------------------<  114[H]  4.9   |  27  |  0.53    Ca    8.1[L]      16 Dec 2024 06:29  Mg     2.3     12-15      Creatinine Trend: 0.53 <--, 0.53 <--, 0.51 <--, 0.51 <--, 0.44 <--, 0.67 <--, 0.50 <--, 0.65 <--, 0.61 <--, 0.75 <--, 0.64 <--                        7.9    5.10  )-----------( 159      ( 16 Dec 2024 06:29 )             23.7     Urine Studies:  Urinalysis Basic - ( 16 Dec 2024 06:29 )    Color:  / Appearance:  / SG:  / pH:   Gluc: 114 mg/dL / Ketone:   / Bili:  / Urobili:    Blood:  / Protein:  / Nitrite:    Leuk Esterase:  / RBC:  / WBC    Sq Epi:  / Non Sq Epi:  / Bacteria:       Sodium, Random Urine: 32 mmol/L (12-15 @ 18:35)  Creatinine, Random Urine: 61 mg/dL (12-15 @ 18:35)  Protein/Creatinine Ratio Calculation: 1.1 Ratio (12-15 @ 18:35)  Osmolality, Random Urine: 538 mos/kg (12-15 @ 18:35)  Potassium, Random Urine: 62 mmol/L (12-15 @ 18:35)  Sodium, Random Urine: 45 mmol/L (12-13 @ 10:34)  Osmolality, Random Urine: 438 mos/kg (12-13 @ 10:34)  Potassium, Random Urine: 44 mmol/L (12-13 @ 10:34)  Osmolality, Random Urine: 392 mosm/Kg (12-12 @ 05:45)  Sodium, Random Urine: 64 mmol/L (12-12 @ 05:45)  Osmolality, Random Urine: 605 mosm/Kg (12-11 @ 10:09)  Sodium, Random Urine: <5 mmol/L (12-11 @ 10:09)

## 2024-12-16 NOTE — PROGRESS NOTE ADULT - SUBJECTIVE AND OBJECTIVE BOX
KAELA [id number]  89yFemale    Diagnosis: 89yFemale S/p Right Hip IM Nailing POD#7    Patient was seen and evaluated at bedside. Patient was sitting up in bed eating breakfast, denies any symptoms of nausea or stomach pain at this time, denies any more episodes of vomiting  Has not voided since removal of vidal catheter, did have bladder scan done yesterday and had straight cath x1  Patient with increased pain to right hip, worse with movement. States did have severe pain overnight last night  Pt denies Fever, Chest pain, SOB, dyspnea, paresthesias, N/V/D, abdominal pain, syncope, or pain anywhere else.       Vital Signs Last 24 Hrs  T(C): 36.9 (16 Dec 2024 05:38), Max: 36.9 (16 Dec 2024 05:38)  T(F): 98.4 (16 Dec 2024 05:38), Max: 98.4 (16 Dec 2024 05:38)  HR: 87 (16 Dec 2024 05:38) (74 - 90)  BP: 128/59 (16 Dec 2024 05:38) (109/72 - 137/75)  BP(mean): 82 (16 Dec 2024 05:38) (82 - 82)  ABP: --  ABP(mean): --  RR: 18 (16 Dec 2024 05:38) (18 - 18)  SpO2: 95% (16 Dec 2024 05:38) (95% - 98%)    O2 Parameters below as of 16 Dec 2024 05:38  Patient On (Oxygen Delivery Method): room air    I&O's Detail    15 Dec 2024 07:01  -  16 Dec 2024 07:00  --------------------------------------------------------  IN:  Total IN: 0 mL    OUT:    Intermittent Catheterization - Urethral (mL): 1000 mL  Total OUT: 1000 mL    Total NET: -1000 mL      Physical Exam:  General: AAOx3, NAD, resting comfortably in bed.  Right Hip:  steristrips Dressing is C/D/I, with antimicrobial silver fabric and medipore tape covering steri strips, with some fluid staining from burst blisters. Fluid Blister noted to posterior aspect of distal incision unchanged, with second fluid blister near folds of skin from abdomen, third fluid blister near abdominal folds has opened with fluid staining dressing. With significant ecchymosis to right lateral hip, without significant improvement. Skin is pink and warm. No erythema. SILT.  Wound with no drainage, healing well. No signs of active bleeding to right hip. No signs of infection  Lower extremity:  No calf tenderness, calves are soft. 2+pulses. NVI. 5/5 Strength of EHL/FHL/ADF/APF b/l.  Good capillary refill. Warm, well-perfused.                                      7.9    5.10  )-----------( 159      ( 16 Dec 2024 06:29 )             23.7   12-16    132[L]  |  99  |  21[H]  ----------------------------<  114[H]  4.9   |  27  |  0.53    Ca    8.1[L]      16 Dec 2024 06:29  Mg     2.3     12-15            Impression:  89yFemale S/p Right Hip IM Nailing POD#7  Plan:  -  Appreciate care as per medicine    > Sodium has been chronically low, will continue to monitor and manage per medicine and Nephro  -  Pain management  -  DVT prophylaxis with lovenox and venodynes  -  Daily Physical Therapy:  WBAT of the Right lower extremity with appropriate assistive device, encourage OOB to chair and to walk around the unit  -  Discharge planning:  Rehab     > Encouraged mobilization to help with pain and voiding.     > Patient and daughter aware if patient continues to retain, will need catheterization.   -  Steristrips only at this time. Replace with dressing if blisters pop    >  Will replace dressing of Medipore tape and silver fabric to new tape and fabric due to staining, will leave steri strips in place  -  Encouraged the use of incentive spirometry   -  Case d/w DR. Landers  KAELA [id number]  89yFemale    Diagnosis: 89yFemale S/p Right Hip IM Nailing POD#7    Patient was seen and evaluated at bedside. Patient was sitting up in bed eating breakfast, denies any symptoms of nausea or stomach pain at this time, denies any more episodes of vomiting  Has not voided since removal of vidal catheter, did have bladder scan done yesterday and had straight cath x1  Patient with increased pain to right hip, worse with movement. States did have severe pain overnight last night  Pt denies Fever, Chest pain, SOB, dyspnea, paresthesias, N/V/D, abdominal pain, syncope, or pain anywhere else.       Vital Signs Last 24 Hrs  T(C): 36.9 (16 Dec 2024 05:38), Max: 36.9 (16 Dec 2024 05:38)  T(F): 98.4 (16 Dec 2024 05:38), Max: 98.4 (16 Dec 2024 05:38)  HR: 87 (16 Dec 2024 05:38) (74 - 90)  BP: 128/59 (16 Dec 2024 05:38) (109/72 - 137/75)  BP(mean): 82 (16 Dec 2024 05:38) (82 - 82)  ABP: --  ABP(mean): --  RR: 18 (16 Dec 2024 05:38) (18 - 18)  SpO2: 95% (16 Dec 2024 05:38) (95% - 98%)    O2 Parameters below as of 16 Dec 2024 05:38  Patient On (Oxygen Delivery Method): room air    I&O's Detail    15 Dec 2024 07:01  -  16 Dec 2024 07:00  --------------------------------------------------------  IN:  Total IN: 0 mL    OUT:    Intermittent Catheterization - Urethral (mL): 1000 mL  Total OUT: 1000 mL    Total NET: -1000 mL      Physical Exam:  General: AAOx3, NAD, resting comfortably in bed.  Right Hip:  steristrips Dressing is C/D/I, with antimicrobial silver fabric and medipore tape covering steri strips, with some fluid staining from burst blisters. Fluid Blister noted to posterior aspect of distal incision unchanged, with second fluid blister near folds of skin from abdomen, third fluid blister near abdominal folds has opened with fluid staining dressing. With significant ecchymosis to right lateral hip, without significant improvement. Skin is pink and warm. No erythema. SILT.  Wound with no drainage, healing well. No signs of active bleeding to right hip. No signs of infection  Lower extremity:  No calf tenderness, calves are soft. 2+pulses. NVI. 5/5 Strength of EHL/FHL/ADF/APF b/l.  Good capillary refill. Warm, well-perfused.                                                 8.2    5.44  )-----------( 202      ( 16 Dec 2024 13:09 )             24.7       132[L]  |  99  |  21[H]  ----------------------------<  114[H]  4.9   |  27  |  0.53    Ca    8.1[L]      16 Dec 2024 06:29  Mg     2.3     12-15            Impression:  89yFemale S/p Right Hip IM Nailing POD#7  Plan:  -  Appreciate care as per medicine    > Sodium has been chronically low, will continue to monitor and manage per medicine and Nephro    > Repeat CBC with Hgb of 8.2  -  Pain management  -  DVT prophylaxis with lovenox and venodynes  -  Daily Physical Therapy:  WBAT of the Right lower extremity with appropriate assistive device, encourage OOB to chair and to walk around the unit  -  Discharge planning:  Rehab     > Encouraged mobilization to help with pain and voiding.     > Patient and daughter aware if patient continues to retain, will need catheterization.   -  Steristrips only at this time. Replace with dressing if blisters pop    >  Will replace dressing of Medipore tape and silver fabric to new tape and fabric due to staining, will leave steri strips in place  -  Encouraged the use of incentive spirometry   -  Case d/w DR. Landers

## 2024-12-16 NOTE — PROGRESS NOTE ADULT - PROVIDER SPECIALTY LIST ADULT
Nephrology
Nephrology
Orthopedics
Orthopedics
Pain Medicine
Internal Medicine
Internal Medicine
Nephrology
Orthopedics
Nephrology
Nephrology
Orthopedics
Surgery
Internal Medicine
Pain Medicine
Pain Medicine
Internal Medicine

## 2024-12-16 NOTE — PROGRESS NOTE ADULT - PROBLEM SELECTOR PROBLEM 6
History of spinal stenosis
Spinal stenosis
Spinal stenosis
Prophylactic measure
Spinal stenosis
History of spinal stenosis
Prophylactic measure
History of spinal stenosis

## 2024-12-16 NOTE — PROGRESS NOTE ADULT - SUBJECTIVE AND OBJECTIVE BOX
Source of information: KAELA DRAKE, Chart review  Patient language: Hungarian  : n/a    HPI:  89 yo F, lives alone HHA  8hrs/6 days, ambulates with a walker, with PMH of HTN, Spinal stenosis,  RT Breast CA s/p lumpectomy, radiation and chemo, present with right hip pain s/p unwitnessed mechanical fall. Patient reports that she walked into her bathroom without a walker when she accidentally slipped and fell on her right side. Patient denies any head trauma, LOC, headache, vision change, chest pain, numbness/tingling sensation. Patient reports since the fall she has been  unable to ambulates.    In the ED,   v/s: 177/80, 73, 97.4F, 96% RA  Labs: WBC 12, Hgb 14  EK BPM, NSR  CXR: negative; There is lower lumbar degeneration and left lumbar curve  Xray RT Hip:  There is an intertrochanteric fracture of the right hip with increased  angulation at the fracture site.  Xray RT Femur:  Lower femur are intact.  Xray RT knee : No effusion. Arterial calcifications. Diffuse moderate degeneration. No fracture  s/p Morphine 2mg  s/p bolus  / Abd x-ray noted. CT ordered which showed- Distended colon with decompression of the rectosigmoid colon without   evidence of obstruction.    Pt is admitted s/p mechanical fall with an intertrochanteric fracture of the right hip. Pt is s/p 12/9 insertion of locking intramedullary bola into right hip. POD#7. ain service consulted for management of right hip pain. Pt seen and examined at bedside this morning. Pt found OOB sitting in chair, alert and oriented x 3, speech clear, no acute distress noted. Pt is Hungarian speaking, no translation needed at this time. Pt reports no pain on right hip at this time while sitting in bed 0/10, but when moving pain is 8/10. SCALE USED: (1-10 VNRS). RN made aware and instructed to give pain meds as ordered. Pt describes right hip pain when present as aching and radiates to the right thigh. Pain is alleviated by pain medication and exacerbated by movement. Pt stating over weekend sith severe abdominal pain and N/V now better. Pt tolerating PO diet. Denies lethargy, chest pain, SOB, nausea, vomiting, constipation. Reports last BM 12/15. Pt reports Hx of constipation, takes Dulcolax twice a day daily. Patient stated goal for pain control: to be able to take deep breaths, get out of bed to chair and ambulate with tolerable pain control. Per IStop review pt takes Tramadol for pain at home. Pt/daughter instructed to use pain meds as needed for pain control.    PAST MEDICAL & SURGICAL HISTORY:  HTN (hypertension)    Breast cancer    Spinal stenosis    H/O breast surgery    FAMILY HISTORY:  No pertinent family history in first degree relatives    Social History:  Lives Alone, HHA  8hrs/ 6 days (08 Dec 2024 18:02)   [x] Denies ETOH use, illicit drug use, and smoking     Allergies    penicillin (Unknown)  Novocain (Unknown)    Intolerances    MEDICATIONS  (STANDING):  acetaminophen     Tablet .. 1000 milliGRAM(s) Oral every 8 hours  atorvastatin 20 milliGRAM(s) Oral at bedtime  cefTRIAXone   IVPB 1000 milliGRAM(s) IV Intermittent every 24 hours  enoxaparin Injectable 30 milliGRAM(s) SubCutaneous every 12 hours  lidocaine   4% Patch 1 Patch Transdermal daily  metoprolol succinate ER 25 milliGRAM(s) Oral daily  multivitamin 1 Tablet(s) Oral daily  naproxen 250 milliGRAM(s) Oral two times a day  polyethylene glycol 3350 17 Gram(s) Oral two times a day  povidone iodine 10% Nasal Swab 1 Application(s) Both Nostrils once  senna 2 Tablet(s) Oral at bedtime  sodium chloride 1 Gram(s) Oral two times a day  sodium chloride 1.5%. 500 milliLiter(s) (50 mL/Hr) IV Continuous <Continuous>  tamsulosin 0.4 milliGRAM(s) Oral at bedtime    MEDICATIONS  (PRN):  aluminum hydroxide/magnesium hydroxide/simethicone Suspension 30 milliLiter(s) Oral every 4 hours PRN Dyspepsia  bisacodyl 10 milliGRAM(s) Oral at bedtime PRN Constipation  magnesium hydroxide Suspension 30 milliLiter(s) Oral daily PRN Constipation  melatonin 3 milliGRAM(s) Oral at bedtime PRN Insomnia  ondansetron Injectable 4 milliGRAM(s) IV Push every 6 hours PRN Nausea and/or Vomiting    Vital Signs Last 24 Hrs  T(C): 36.9 (16 Dec 2024 05:38), Max: 36.9 (16 Dec 2024 05:38)  T(F): 98.4 (16 Dec 2024 05:38), Max: 98.4 (16 Dec 2024 05:38)  HR: 73 (16 Dec 2024 10:26) (73 - 90)  BP: 106/67 (16 Dec 2024 10:26) (106/67 - 137/75)  BP(mean): 82 (16 Dec 2024 05:38) (82 - 82)  RR: 18 (16 Dec 2024 05:38) (18 - 18)  SpO2: 95% (16 Dec 2024 05:38) (95% - 98%)    Parameters below as of 16 Dec 2024 05:38  Patient On (Oxygen Delivery Method): room air    LABS: Reviewed                        8.2    5.44  )-----------( 202      ( 16 Dec 2024 13:09 )             24.7     12-16    132[L]  |  99  |  21[H]  ----------------------------<  114[H]  4.9   |  27  |  0.53    Ca    8.1[L]      16 Dec 2024 06:29  Mg     2.3     12-15    Urinalysis Basic - ( 16 Dec 2024 06:29 )    Color: x / Appearance: x / SG: x / pH: x  Gluc: 114 mg/dL / Ketone: x  / Bili: x / Urobili: x   Blood: x / Protein: x / Nitrite: x   Leuk Esterase: x / RBC: x / WBC x   Sq Epi: x / Non Sq Epi: x / Bacteria: x    CAPILLARY BLOOD GLUCOSE    Radiology: Reviewed  ACC: [id number] EXAM:  XR KNEE AP LAT OBL 3 VIEWS RT   ORDERED BY:  WEI BRYSON     ACC: [id number] EXAM:  XR FEMUR 2 VIEWS RT   ORDERED BY:  WEI BRYSON     ACC: [id number] EXAM:  XR CHEST PORTABLE URGENT 1V   ORDERED BY:  WEI BRYSON     ACC: [id number] EXAM:  XR HIP WITH PELV 1V RT   ORDERED BY:  WEI BRYSON     *** ADDENDUM # 1 ***    ADDENDUM.    Right hip with pelvis, right femur, and right knee findings are as   follows.    Right hip with pelvis. 3 views.    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 There is mild   symmetric hip degeneration and arterial calcification.    There is an intertrochanteric fracture of the right hip with increased   angulation at the fracture site.    Right femur. 4 views. Lower femur are intact.    Right knee. 3 views. No effusion. Arterial calcifications. Diffuse   moderate degeneration. No fracture.    Intertrochanteric fracture right hip.    --- End of Report ---    *** END OF ADDENDUM # 1 ***    PROCEDURE DATE:  2024      INTERPRETATION:  AP semierect chest on 2024 at 3:43 PM.   Patient had trauma.    Elevated diaphragms crowds the chest.    Heart magnified by technique.    No definite acute finding or visible fracture.    Findings are similar to 2021.    IMPRESSION: No acute finding.    --- End of Report ---    ***Please see the addendum at the top of this report. It may contain   additional important information or changes.****    MARIA D JURADO MD; Attending Radiologist  This document has been electronically signed. Dec  8 2024  4:19PM  1st Addendum: MARIA D JURADO MD; Attending Radiologist  The first addendum was electronically signed on: Dec  8 2024  6:29PM.  ACC: [id number] EXAM:  CT ABDOMEN AND PELVIS OC IC   ORDERED BY: MASHA CUELLAR     PROCEDURE DATE:  2024      INTERPRETATION:  CLINICAL INFORMATION: Abdominal distention.    COMPARISON: Abdominal x-ray of the same date    CONTRAST/COMPLICATIONS:  IV Contrast: Omnipaque 350  90 cc administered   10 cc discarded  Oral Contrast: Omnipaque 300    PROCEDURE:  CT of the Abdomen and Pelvis was performed.  Sagittal and coronal reformats were performed.    FINDINGS:  LOWER CHEST: Linear atelectasis/scarring in the right middle lobe. Aortic   and mitral annular calcifications.    LIVER: Within normal limits.  BILE DUCTS: Normal caliber.  GALLBLADDER: Cholelithiasis.  SPLEEN: Within normal limits.  PANCREAS: Within normal limits.  ADRENALS: Within normal limits.  KIDNEYS/URETERS: Large exophytic right lower pole renal cyst. Additional   bilateral hypodensities too small to characterize nonobstructive left   midpole renal calculus tiny fat density lesion upper pole likely   angiomyolipoma.    BLADDER: Vidal catheter.  REPRODUCTIVE ORGANS: Uterus and adnexa within normal limits.    BOWEL: No bowel obstruction. Appendix is normal. Presacral stranding is   appreciated. The rectosigmoid colon is relatively decompressed with   distended ascending and transverse colon without evidence of obstruction   or volvulus  PERITONEUM/RETROPERITONEUM: Within normal limits.  VESSELS: Atherosclerotic changes.  LYMPH NODES: No lymphadenopathy.  ABDOMINAL WALL: Within normal limits.  BONES: Degenerative changes. Mild levoscoliosis. ORIF the right hip,   recently performed. Hemangiomas lower thoracic spine    IMPRESSION:  Distended colon with decompression of the rectosigmoid colon without   evidence of obstruction.    --- End of Report ---    SORAYA RICCI MD; Attending Radiologist  This document has been electronically signed. Dec 15 2024 12:36PM    ORT Score -   Family Hx of substance abuse	Female	      Male  Alcohol 	                                           1                     3  Illegal drugs	                                   2                     3  Rx drugs                                           4 	                  4  Personal Hx of substance abuse		  Alcohol 	                                          3	                  3  Illegal drugs                                     4	                  4  Rx drugs                                            5 	                  5  Age between 16- 45 years	           1                     1  hx preadolescent sexual abuse	   3 	                  0  Psychological disease		  ADD, OCD, bipolar, schizophrenia   2	          2  Depression                                           1 	          1  Total: 0    a score of 3 or lower indicates low risk for opioid abuse		  a score of 4-7 indicates moderate risk for opioid abuse		  a score of 8 or higher indicates high risk for opioid abuse    REVIEW OF SYSTEMS:  CONSTITUTIONAL: No fever or fatigue  HEENT:  No difficulty hearing, no change in vision  NECK: No pain or stiffness  RESPIRATORY: No cough, wheezing, chills or hemoptysis; No shortness of breath  CARDIOVASCULAR: No chest pain, palpitations, dizziness, or leg swelling  GASTROINTESTINAL: No loss of appetite, decreased PO intake. No abdominal or epigastric pain. No nausea, vomiting; No diarrhea, Hx of constipation.   GENITOURINARY: No dysuria, frequency, hematuria, retention or incontinence  MUSCULOSKELETAL: + intermittent right hip, + right hip/thigh swelling, no upper or left lower motor strength weakness, no saddle anesthesia, bowel/bladder incontinence, + fall, + occasional urinary retention based on her hx  NEURO: No headaches, No numbness/tingling b/l LE, No weakness  ENDOCRINE: No polyuria, polydipsia, heat or cold intolerance; No hair loss  PSYCHIATRIC: No depression, anxiety or difficulty sleeping    PHYSICAL EXAM:  GENERAL:  Alert & Oriented X 3, cooperative, NAD, Good concentration. Speech is clear. Hungarian speaking  RESPIRATORY: Respirations even and unlabored. Clear to auscultation bilaterally; No rales, rhonchi, wheezing, or rubs  CARDIOVASCULAR: Normal S1/S2, regular rate and rhythm; No murmurs, rubs, or gallops. No JVD.   GASTROINTESTINAL:  Soft, Nontender, + distended; Bowel sounds present  GENITOURINARY: vidal catheter in place, with clear yellow urine draining.   PERIPHERAL VASCULAR:  Extremities warm with mild to moderate right hip swelling . + Peripheral Pulses, No cyanosis, No calf tenderness  MUSCULOSKELETAL: Motor Strength 5/5 B/L upper and left lower extremities; moves all extremities equally against gravity except RLE s/p R hip surgery on  POD #7, + tenderness on palpation of right hip  SKIN: Warm, dry, intact. Right hip noted ecchymotic and swollen, steri strips in place, CDI     Risk factors associated with adverse outcomes related to opioid treatment  [ ]  Concurrent benzodiazepine use  [ ]  History/ Active substance use or alcohol use disorder  [ ] Psychiatric co-morbidity  [ ] Sleep apnea  [ ] COPD  [ ] BMI> 35  [ ] Liver dysfunction  [ ] Renal dysfunction  [ ] CHF  [ ] Smoker  [x]  Age > 60 years    [x]  [place]  Reviewed and Copied to Chart. See below.    Plan of care and goal oriented pain management treatment options were discussed with patient and /or primary care giver; all questions and concerns were addressed and care was aligned with patient's wishes.    Educated patient on goal oriented pain management treatment options     24 @ 15:43

## 2024-12-16 NOTE — DISCHARGE NOTE NURSING/CASE MANAGEMENT/SOCIAL WORK - PATIENT PORTAL LINK FT
You can access the FollowMyHealth Patient Portal offered by [facility] by registering at the following website: [email or web address]. By joining [employer]’s FollowMyHealth portal, you will also be able to view your health information using other applications (apps) compatible with our system.

## 2024-12-16 NOTE — PROGRESS NOTE ADULT - PROBLEM SELECTOR PLAN 1
Pt presented after a mechanical fall with acute right hip pain which is somatic and neuropathic in nature due to right hip fracture. Pt is s/p 12/9 insertion of locking intramedullary bola into right hip POD #7. Pending rehab. 12/14 Abd x-ray noted. CT ordered which showed- Distended colon with decompression of the rectosigmoid colon without   evidence of obstruction. Surgery consulted. High risk medications reviewed. Avoid polypharmacy. Avoid IV opioids. Avoid NSAIDs and benzodiazepines. Non-pharmacological sleep aides initiated. Non-opioid medications and non-pharmacological pain management measures initiated.   Opioid pain recommendations    - No opioids recommended at this time.   Non-opioid pain recommendations   - Continue Acetaminophen 1 gram PO q 8 hours for 4 days. Monitor LFTs  - Continue Lidoderm 4% patch daily.   - Discontinue Toradol IV. Start Naproxen 250 mg po q12h x 5 days with PPI.  Bowel Regimen  - Miralax 17G PO daily  - Senna 2 tablets at bedtime for constipation  - Dulcolax 10 mg DC daily PRN  Mild pain 1 - 3  - Non-pharmacological pain treatment recommendations  - Warm/ Cool packs PRN   - Repositioning, guided imagery, relaxation, distraction.  - Physical therapy OOB if no contraindications   Recommendations discussed with primary team and RN.

## 2024-12-16 NOTE — PROGRESS NOTE ADULT - ASSESSMENT
Search Terms: KAELA SEBASTIAN, 08/30/1932Search Date: 12/10/2024 10:51:30 AM  The Drug Utilization Report below displays all of the controlled substance prescriptions, if any, that your patient has filled in the last twelve months. The information displayed on this report is compiled from pharmacy submissions to the Department, and accurately reflects the information as submitted by the pharmacies.    This report was requested by: Mariann Gooden | Reference #: [id number]    You have not added a JORGE number. Keeping your JORGE number(s) up to date on the My JORGE # tab will enable the separation of your prescriptions from others in the search results.    Practitioner Count: 1  Pharmacy Count: 1  Current Opioid Prescriptions: 0  Current Benzodiazepine Prescriptions: 0  Current Stimulant Prescriptions: 0      Patient Demographic Information (PDI)       PDI	First Name	Last Name	Birth Date	Gender	Street Address	[facility] Code  A	Kaela Sebastian	08/30/1932	Female	[id number]	[facility]	[id number]    Prescription Information      PDI Filter:    PDI	Current Rx	Drug Type	Rx Written	Rx Dispensed	Drug	Quantity	Days Supply	Prescriber Name	Prescriber JORGE #	Payment Method	Dispenser  A	N	O	10/09/2024	10/09/2024	tramadol hcl 50 mg tablet	30	30	Demetri Cordova MD	[id number]	Insurance	[facility]  A	N	O	08/29/2023	12/12/2023	tramadol hcl 50 mg tablet	23	23	Demetri Cordova MD	[id number]	Insurance	[facility]    * - Details of Drug Type : O = Opioid, B = Benzodiazepine, S = Stimulant    * - Drugs marked with an asterisk are compound drugs. If the compound drug is made up of more than one controlled substance, then each controlled substance will be a separate row in the table.

## 2024-12-16 NOTE — PROGRESS NOTE ADULT - ASSESSMENT
Patient is a 89yo Female with HTN on HCTZ, Spinal stenosis,  RT Breast CA s/p lumpectomy, RT/ chemo p/w Rt hip pain s/p mechanical fall. Pt a/w Rt intertrochanteric fracture s/p OR 12/9 with insertion of locking intramedullary bola into right hip. Nephrology consulted for hyponatremia    1. Hyponatremia- initial Urine Na <5, Urine Osm 650, Serum Osm 264; consistent with hypovolemia. s/p 1.5% NaCl @ 50ml/hr x 10 hrs (x2 days). Repeat urine studies consistent with SIADH. Serum Na improving on NaCl 1g PO bid. c/w 1L FR. Encourage Glucerna intake. Avoid NSAIDS (Toradol) due to hyponatremia.   AM Cortisol is normal at 13.2 and TSH is also normal.  Monitor serum Na.     2. Essential HTN-BP acceptable. c/w Valsartan. Continue to hold HCTZ due to hyponatremia; recc to hold on discharge due to risk of hyponatremia marguerite in the elderly. Monitor BP    3. Urinary retention - trial of void. Pt with signs of retention; f/u repeat bladder scan at 1pm; if retaining; recc reinsertion of Vidal catheter. c/w flomax    4. Rt intertrochanteric fracture- s/p OR 12/9 with insertion of locking intramedullary bola into right hip. Avoid NSAIDs due to hyponatremia.     UA on 12/15 is indeterminate for an infection, as this sample was taken with vidal catheter and was traumatic, based on amount of blood present. If patient is able to urinate without catheter, then recommend repeating UA and urine studies to assess for infection.    [facility] NEPHROLOGY  Yamil Liz M.D.  Uriel Arvealo D.O.  Domenica Sandoval M.D.  MD Marion Yun, MSN, ANP-C    Telephone: [phone]  Facsimile: [phone] [facility], [address]  [place] [address]

## 2024-12-16 NOTE — DISCHARGE NOTE NURSING/CASE MANAGEMENT/SOCIAL WORK - FINANCIAL ASSISTANCE
[facility] provides services at a reduced cost to those who are determined to be eligible through [facility]’s financial assistance program. Information regarding [facility]’s financial assistance program can be found by going to [email or web address] or by calling [phone].

## 2024-12-16 NOTE — PROGRESS NOTE ADULT - PROBLEM SELECTOR PROBLEM 1
Acute right hip pain
Acute right hip pain
Intertrochanteric fracture of right hip
Intertrochanteric fracture of right hip
Acute right hip pain
Intertrochanteric fracture of right hip

## 2024-12-19 PROBLEM — C50.919 MALIGNANT NEOPLASM OF UNSPECIFIED SITE OF UNSPECIFIED FEMALE BREAST: Chronic | Status: ACTIVE | Noted: 2024-12-08

## 2024-12-19 PROBLEM — I10 ESSENTIAL (PRIMARY) HYPERTENSION: Chronic | Status: ACTIVE | Noted: 2024-12-08

## 2024-12-19 PROBLEM — M48.00 SPINAL STENOSIS, SITE UNSPECIFIED: Chronic | Status: ACTIVE | Noted: 2024-12-08

## 2024-12-23 ENCOUNTER — APPOINTMENT (OUTPATIENT)
Age: 88
End: 2024-12-23

## 2024-12-23 VITALS
HEIGHT: 62 IN | OXYGEN SATURATION: 96 % | BODY MASS INDEX: 25.76 KG/M2 | SYSTOLIC BLOOD PRESSURE: 122 MMHG | DIASTOLIC BLOOD PRESSURE: 65 MMHG | HEART RATE: 74 BPM | WEIGHT: 140 LBS

## 2024-12-23 DIAGNOSIS — R73.03 PREDIABETES.: ICD-10-CM

## 2024-12-23 PROCEDURE — 99024 POSTOP FOLLOW-UP VISIT: CPT

## 2025-01-13 PROBLEM — S72.141D CLOSED DISPLACED INTERTROCHANTERIC FRACTURE OF RIGHT FEMUR WITH ROUTINE HEALING, SUBSEQUENT ENCOUNTER: Status: ACTIVE | Noted: 2025-01-13

## 2025-01-27 ENCOUNTER — APPOINTMENT (OUTPATIENT)
Age: 89
End: 2025-01-27

## 2025-01-27 VITALS
HEART RATE: 71 BPM | HEIGHT: 62 IN | WEIGHT: 140 LBS | SYSTOLIC BLOOD PRESSURE: 166 MMHG | OXYGEN SATURATION: 98 % | DIASTOLIC BLOOD PRESSURE: 85 MMHG | BODY MASS INDEX: 25.76 KG/M2

## 2025-01-27 PROCEDURE — 99024 POSTOP FOLLOW-UP VISIT: CPT

## 2025-01-27 PROCEDURE — 73552 X-RAY EXAM OF FEMUR 2/>: CPT | Mod: RT

## (undated) DEVICE — PREP BETADINE KIT

## (undated) DEVICE — SOL IRR POUR NS 0.9% 1500ML

## (undated) DEVICE — PACK MINOR NO DRAPE

## (undated) DEVICE — DRSG ADAPTIC 3 X 8"

## (undated) DEVICE — TAPE SILK 3"

## (undated) DEVICE — GLV 8 PROTEXIS (CREAM) MICRO

## (undated) DEVICE — DRAPE IOBAN 13" X 13"

## (undated) DEVICE — SUT POLYSORB 1 18" UNDYED

## (undated) DEVICE — WARMING BLANKET FULL ADULT

## (undated) DEVICE — GLV 8.5 PROTEXIS (BLUE)

## (undated) DEVICE — WARMING BLANKET UPPER ADULT

## (undated) DEVICE — DRSG CURITY GAUZE SPONGE 4 X 4" 12-PLY

## (undated) DEVICE — DRSG COBAN 6"

## (undated) DEVICE — SUT POLYSORB 2-0 30" GS-10 UNDYED

## (undated) DEVICE — POSITIONER FOAM MATTRESS PAD CONVOLUTED (PINK)

## (undated) DEVICE — STAPLER SKIN VISI-STAT 35 WIDE

## (undated) DEVICE — DRILL BIT BIOMET GRADUATED LONG 4.3MM

## (undated) DEVICE — DRSG MEDIPORE DRESS IT 7-7/8 X 11"

## (undated) DEVICE — VENODYNE/SCD SLEEVE CALF MEDIUM

## (undated) DEVICE — DRAPE LEGGINGS 6" CUFF 28X43"

## (undated) DEVICE — DRILL ANTI ROTATION

## (undated) DEVICE — DRAPE SHOWER CURTAIN ISOLATION

## (undated) DEVICE — DRSG COMBINE 5 X 9"

## (undated) DEVICE — DRSG DERMABOND PRINEO 60CM